# Patient Record
Sex: FEMALE | Race: WHITE | NOT HISPANIC OR LATINO | Employment: PART TIME | URBAN - NONMETROPOLITAN AREA
[De-identification: names, ages, dates, MRNs, and addresses within clinical notes are randomized per-mention and may not be internally consistent; named-entity substitution may affect disease eponyms.]

---

## 2017-03-24 ENCOUNTER — OFFICE VISIT - GICH (OUTPATIENT)
Dept: FAMILY MEDICINE | Facility: OTHER | Age: 22
End: 2017-03-24

## 2017-03-24 ENCOUNTER — HISTORY (OUTPATIENT)
Dept: FAMILY MEDICINE | Facility: OTHER | Age: 22
End: 2017-03-24

## 2017-03-24 DIAGNOSIS — F98.8 OTHER SPECIFIED BEHAVIORAL AND EMOTIONAL DISORDERS WITH ONSET USUALLY OCCURRING IN CHILDHOOD AND ADOLESCENCE: ICD-10-CM

## 2017-03-24 DIAGNOSIS — F41.8 OTHER SPECIFIED ANXIETY DISORDERS: ICD-10-CM

## 2017-03-24 DIAGNOSIS — Z00.00 ENCOUNTER FOR GENERAL ADULT MEDICAL EXAMINATION WITHOUT ABNORMAL FINDINGS: ICD-10-CM

## 2017-03-24 DIAGNOSIS — G43.809 OTHER MIGRAINE, NOT INTRACTABLE, WITHOUT STATUS MIGRAINOSUS: ICD-10-CM

## 2017-03-24 DIAGNOSIS — G47.00 INSOMNIA: ICD-10-CM

## 2017-03-24 DIAGNOSIS — Z79.899 OTHER LONG TERM (CURRENT) DRUG THERAPY: ICD-10-CM

## 2017-03-24 DIAGNOSIS — Z23 ENCOUNTER FOR IMMUNIZATION: ICD-10-CM

## 2017-03-24 ASSESSMENT — PATIENT HEALTH QUESTIONNAIRE - PHQ9: SUM OF ALL RESPONSES TO PHQ QUESTIONS 1-9: 6

## 2017-03-24 ASSESSMENT — ANXIETY QUESTIONNAIRES
4. TROUBLE RELAXING: NOT AT ALL
GAD7 TOTAL SCORE: 2
5. BEING SO RESTLESS THAT IT IS HARD TO SIT STILL: NOT AT ALL
3. WORRYING TOO MUCH ABOUT DIFFERENT THINGS: SEVERAL DAYS
1. FEELING NERVOUS, ANXIOUS, OR ON EDGE: SEVERAL DAYS
2. NOT BEING ABLE TO STOP OR CONTROL WORRYING: NOT AT ALL
7. FEELING AFRAID AS IF SOMETHING AWFUL MIGHT HAPPEN: NOT AT ALL
6. BECOMING EASILY ANNOYED OR IRRITABLE: NOT AT ALL

## 2017-03-25 ENCOUNTER — HISTORY (OUTPATIENT)
Dept: FAMILY MEDICINE | Facility: OTHER | Age: 22
End: 2017-03-25

## 2017-04-10 LAB
6-MONOACETYL MORPHINE - HISTORICAL: ABNORMAL NG/ML
AMPHETAMINE URINE - HISTORICAL: ABNORMAL NG/ML
BARBITURATE URINE - HISTORICAL: ABNORMAL NG/ML
BENZODIAZEPINE URINE - HISTORICAL: ABNORMAL NG/ML
BUPRENORPHRINE URINE - HISTORICAL: ABNORMAL NG/ML
COCAINE METAB URINE - HISTORICAL: ABNORMAL NG/ML
CREAT UR - HISTORICAL: 317 MG/DL
ETHYLGLUCURONIDE URINE - HISTORICAL: ABNORMAL NG/ML
FENTANYL URINE - HISTORICAL: ABNORMAL NG/ML
MASS SPECTROMETRY URINE - HISTORICAL: ABNORMAL
METHADONE URINE - HISTORICAL: ABNORMAL NG/ML
OPIATES URINE - HISTORICAL: ABNORMAL NG/ML
OXYCODONE URINE - HISTORICAL: ABNORMAL NG/ML
PH URINE - HISTORICAL: 7.6
PROPOXYPHENE URINE - HISTORICAL: ABNORMAL NG/ML
THC 50 URINE - HISTORICAL: ABNORMAL NG/ML
TRAMADOL - HISTORICAL: ABNORMAL NG/ML

## 2017-04-11 ENCOUNTER — AMBULATORY - GICH (OUTPATIENT)
Dept: SCHEDULING | Facility: OTHER | Age: 22
End: 2017-04-11

## 2017-05-18 ENCOUNTER — COMMUNICATION - GICH (OUTPATIENT)
Dept: FAMILY MEDICINE | Facility: OTHER | Age: 22
End: 2017-05-18

## 2017-05-18 DIAGNOSIS — H60.502 ACUTE NONINFECTIVE OTITIS EXTERNA OF LEFT EAR: ICD-10-CM

## 2017-06-19 ENCOUNTER — OFFICE VISIT - GICH (OUTPATIENT)
Dept: FAMILY MEDICINE | Facility: OTHER | Age: 22
End: 2017-06-19

## 2017-06-19 ENCOUNTER — HISTORY (OUTPATIENT)
Dept: FAMILY MEDICINE | Facility: OTHER | Age: 22
End: 2017-06-19

## 2017-06-19 DIAGNOSIS — F98.8 OTHER SPECIFIED BEHAVIORAL AND EMOTIONAL DISORDERS WITH ONSET USUALLY OCCURRING IN CHILDHOOD AND ADOLESCENCE: ICD-10-CM

## 2017-06-19 DIAGNOSIS — Z23 ENCOUNTER FOR IMMUNIZATION: ICD-10-CM

## 2017-06-22 ENCOUNTER — HISTORY (OUTPATIENT)
Dept: EMERGENCY MEDICINE | Facility: OTHER | Age: 22
End: 2017-06-22

## 2017-06-26 ENCOUNTER — COMMUNICATION - GICH (OUTPATIENT)
Dept: FAMILY MEDICINE | Facility: OTHER | Age: 22
End: 2017-06-26

## 2017-06-26 DIAGNOSIS — S29.019S: ICD-10-CM

## 2017-06-27 ENCOUNTER — COMMUNICATION - GICH (OUTPATIENT)
Dept: FAMILY MEDICINE | Facility: OTHER | Age: 22
End: 2017-06-27

## 2017-06-27 ENCOUNTER — HISTORY (OUTPATIENT)
Dept: FAMILY MEDICINE | Facility: OTHER | Age: 22
End: 2017-06-27

## 2017-06-27 ENCOUNTER — OFFICE VISIT - GICH (OUTPATIENT)
Dept: FAMILY MEDICINE | Facility: OTHER | Age: 22
End: 2017-06-27

## 2017-06-27 DIAGNOSIS — S39.012D STRAIN OF MUSCLE, FASCIA AND TENDON OF LOWER BACK, SUBSEQUENT ENCOUNTER: ICD-10-CM

## 2017-06-28 ENCOUNTER — COMMUNICATION - GICH (OUTPATIENT)
Dept: FAMILY MEDICINE | Facility: OTHER | Age: 22
End: 2017-06-28

## 2017-06-30 ENCOUNTER — HOSPITAL ENCOUNTER (OUTPATIENT)
Dept: PHYSICAL THERAPY | Facility: OTHER | Age: 22
Setting detail: THERAPIES SERIES
End: 2017-06-30
Attending: FAMILY MEDICINE

## 2017-06-30 DIAGNOSIS — S29.019S: ICD-10-CM

## 2017-07-03 ENCOUNTER — HOSPITAL ENCOUNTER (OUTPATIENT)
Dept: PHYSICAL THERAPY | Facility: OTHER | Age: 22
Setting detail: THERAPIES SERIES
End: 2017-07-03
Attending: FAMILY MEDICINE

## 2017-07-05 ENCOUNTER — COMMUNICATION - GICH (OUTPATIENT)
Dept: FAMILY MEDICINE | Facility: OTHER | Age: 22
End: 2017-07-05

## 2017-07-07 ENCOUNTER — HOSPITAL ENCOUNTER (OUTPATIENT)
Dept: PHYSICAL THERAPY | Facility: OTHER | Age: 22
Setting detail: THERAPIES SERIES
End: 2017-07-07
Attending: FAMILY MEDICINE

## 2017-07-10 ENCOUNTER — HOSPITAL ENCOUNTER (OUTPATIENT)
Dept: PHYSICAL THERAPY | Facility: OTHER | Age: 22
Setting detail: THERAPIES SERIES
End: 2017-07-10
Attending: FAMILY MEDICINE

## 2017-07-14 ENCOUNTER — HOSPITAL ENCOUNTER (OUTPATIENT)
Dept: PHYSICAL THERAPY | Facility: OTHER | Age: 22
Setting detail: THERAPIES SERIES
End: 2017-07-14
Attending: FAMILY MEDICINE

## 2017-07-18 ENCOUNTER — HOSPITAL ENCOUNTER (OUTPATIENT)
Dept: PHYSICAL THERAPY | Facility: OTHER | Age: 22
Setting detail: THERAPIES SERIES
End: 2017-07-18

## 2017-09-26 ENCOUNTER — COMMUNICATION - GICH (OUTPATIENT)
Dept: FAMILY MEDICINE | Facility: OTHER | Age: 22
End: 2017-09-26

## 2017-09-26 DIAGNOSIS — G47.00 INSOMNIA: ICD-10-CM

## 2017-10-09 ENCOUNTER — COMMUNICATION - GICH (OUTPATIENT)
Dept: FAMILY MEDICINE | Facility: OTHER | Age: 22
End: 2017-10-09

## 2017-10-09 DIAGNOSIS — G47.00 INSOMNIA: ICD-10-CM

## 2017-11-27 ENCOUNTER — COMMUNICATION - GICH (OUTPATIENT)
Dept: FAMILY MEDICINE | Facility: OTHER | Age: 22
End: 2017-11-27

## 2017-11-27 DIAGNOSIS — Z11.1 ENCOUNTER FOR SCREENING FOR RESPIRATORY TUBERCULOSIS: ICD-10-CM

## 2017-11-28 ENCOUNTER — AMBULATORY - GICH (OUTPATIENT)
Dept: SCHEDULING | Facility: OTHER | Age: 22
End: 2017-11-28

## 2017-11-30 ENCOUNTER — COMMUNICATION - GICH (OUTPATIENT)
Dept: FAMILY MEDICINE | Facility: OTHER | Age: 22
End: 2017-11-30

## 2017-12-07 ENCOUNTER — COMMUNICATION - GICH (OUTPATIENT)
Dept: FAMILY MEDICINE | Facility: OTHER | Age: 22
End: 2017-12-07

## 2017-12-07 DIAGNOSIS — G47.00 INSOMNIA: ICD-10-CM

## 2017-12-27 NOTE — PROGRESS NOTES
Patient Information     Patient Name MRN Sex George Negron [id number] Female 1995      Progress Notes by Cyn Avendaño MD at 2017  1:30 PM     Author:  Cyn Avendaño MD Service:  (none) Author Type:  Physician     Filed:  2017  4:56 PM Encounter Date:  2017 Status:  Signed     :  Cyn Avendaño MD (Physician)            SUBJECTIVE:    George Mcknight is a 21 y.o. female who presents for refill of the adderall she takes for ADD.  She has not needed to take it as much during the summer when she is at work, but does use it consistently during the school year.  She will be starting her 2nd year of college at [employer] this .  She is planning on going into nursing.  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  She has had a good appetite.  No issues with weight loss.  She feels that her mood has been good.    She also needs her 3rd gardasil vaccine and would like to get that today.    HPI  I personally reviewed medications/allergies/history listed below:      Allergies      Allergen   Reactions     Cats (Fur, Dander, Saliva)  Edema     Swelling of face/eyes/throat      Other [Unlisted Allergen (Include Detail In Comments)]  Other - Describe In Comment Field     Trees - itchy    , No family history on file.,   Current Outpatient Prescriptions on File Prior to Visit       Medication  Sig Dispense Refill     albuterol HFA 90 mcg/actuation inhaler Inhale 2 Puffs by mouth 4 times daily if needed.       cholecalciferol (VITAMIN D) 1,000 unit capsule Take 1 capsule by mouth once daily.       FLUoxetine (PROZAC) 10 mg capsule Take 1 capsule by mouth once daily. 30 capsule 11     FLUoxetine (PROZAC) 40 mg capsule Take 1 capsule by mouth once daily. 30 capsule 11     medication order composer ZMA, 2 tablets by mouth at bedtime (zinc, mag, vit B6)       ofloxacin (FLOXIN) 0.3 % otic solution Place 5 Drops  into left ear 2 times daily. 5 mL 2     pseudoephedrine (SUDAFED) 60 mg tablet Take 1 tablet by mouth every 6 hours if needed (seasonal allergies).       ramelteon (ROZEREM) 8 mg tablet Take 1 tablet by mouth at bedtime. 30 tablet 2     SUMAtriptan (IMITREX) 50 mg tablet Take 1 tablet by mouth every 2 hours if needed for Migraine. Max dose: 200mg per 24 hrs. 9 tablet 11     No current facility-administered medications on file prior to visit.    ,   Past Medical History:     Diagnosis  Date     ADD (attention deficit disorder)      Convergence insufficiency 2012    as a result of TBI - had to wear prism glasses for about 1 year.      Depression with anxiety      Migraines      TBI (traumatic brain injury) (HC) 12/30/2012    playing high school hockey.    ,   Patient Active Problem List     Diagnosis  Code     Controlled substance agreement signed 3/24/17 Z79.899     ADD (attention deficit disorder) F98.8     Migraine without status migrainosus, not intractable G43.909     Depression with anxiety F41.8     Insomnia G47.00    and No past surgical history on file.  Social History     Social History        Marital status:  Single     Spouse name: N/A     Number of children:  N/A     Years of education:  N/A     Occupational History      Not on file.     Social History Main Topics         Smoking status:   Never Smoker     Smokeless tobacco:   Never Used     Alcohol use   Yes      Comment: a few times a year      Drug use:   No     Sexual activity:   Not Currently     Other Topics  Concern     Not on file      Social History Narrative     Student at [employer].  Studying toward her BSN.  Family moved from [place], [place] fall 2016.  Both parents grew up in the [facility] [place].    Mom - Noel is RN, Clinic Nurse Manager at [employer].    Dad - Armando    Sister - Marianna is  and works as an RN at [employer] in [place].      REVIEW OF SYSTEMS:  Review of Systems   Constitutional: Negative for malaise/fatigue and weight loss.    Psychiatric/Behavioral: Negative for depression.   All other systems reviewed and are negative.      OBJECTIVE:  BP 98/62  Temp 97.6  F (36.4  C) (Tympanic)  Wt 55.3 kg (122 lb)  LMP 05/26/2017  Breastfeeding? No    EXAM:   Physical Exam   Constitutional: She is well-developed, well-nourished, and in no distress.   HENT:   Head: Normocephalic.   Eyes: Pupils are equal, round, and reactive to light.   Neck: Normal range of motion. Neck supple. No thyromegaly present.   Cardiovascular: Normal rate, regular rhythm and normal heart sounds.    No murmur heard.  Pulmonary/Chest: Effort normal and breath sounds normal. No respiratory distress. She has no wheezes. She has no rales.   Musculoskeletal: She exhibits no edema.   Lymphadenopathy:     She has no cervical adenopathy.   Psychiatric: Affect normal.   PHQ Depression Screen  Date of PHQ exam: 06/19/17  Over the last 2 weeks, how often have you been bothered by any of the following problems?  1. Little interest or pleasure in doing things: 0 - Not at all  2. Feeling down, depressed, or hopeless: 0 - Not at all                ASSESSMENT/PLAN:    ICD-10-CM    1. ADD (attention deficit disorder) F98.8 dextroamphetamine-amphetamine (ADDERALL XR) 25 mg capsule      dextroamphetamine-amphetamine (ADDERALL) 10 mg tablet      DISCONTINUED: dextroamphetamine-amphetamine (ADDERALL XR) 25 mg capsule      DISCONTINUED: dextroamphetamine-amphetamine (ADDERALL) 10 mg tablet      DISCONTINUED: dextroamphetamine-amphetamine (ADDERALL XR) 25 mg capsule      DISCONTINUED: dextroamphetamine-amphetamine (ADDERALL) 10 mg tablet   2. Need for HPV vaccination Z23 OMNI GARDASIL 9      CO ADMIN VACC INITIAL        Plan:    1.   website printout reviewed and scanned today.  [employer] last completed 3/24/17.  Contract last completed same date.  Follow up in approximately 3 months for next refill.  2.  gardasil #3 given today.  Cyn Avendaño MD ....................  6/19/2017    4:56 PM

## 2017-12-27 NOTE — PROGRESS NOTES
Patient Information     Patient Name MRN Sex George Negron [id number] Female 1995      Progress Notes by Cyn Avendaño MD at 2017 11:00 AM     Author:  Cyn Avendaño MD Service:  (none) Author Type:  Physician     Filed:  2017 12:55 PM Encounter Date:  2017 Status:  Signed     :  Cyn Avendaño MD (Physician)            SUBJECTIVE:    George Mcknight is a 21 y.o. female who presents for follow up of a worker's compensation injury which occurred at [facility] on 17.  She and another assistant were helping to transfer a heavy patient.  The patient is an assist of 2.  George had ahold of the transfer belt and was standing in front of patient.  The brakes on the wheel chair broke and the chair was scooting further back than anticipated.  The other worker was behind the chair.  To avoid having the patient fall, George had to strain to lift patient so that she would not fall.  She ended up straining her mid to low back and radiates to her left back.  She was evaluated in the ER.  Went to work on .  Was worse that day.  Has some tingling down her left leg.  She has been referred to Physical Therapy, but is waiting on worker's compensation to approve it before she can move forth.  No bowel or bladder incontinence/dysfunction.  No saddle anesthesia.  No weakness of lower extremities.    HPI  I personally reviewed medications/allergies/history listed below:      Allergies      Allergen   Reactions     Cats (Fur, Dander, Saliva)  Edema     Swelling of face/eyes/throat      Other [Unlisted Allergen (Include Detail In Comments)]  Other - Describe In Comment Field     Trees - itchy    , No family history on file.,   Current Outpatient Prescriptions on File Prior to Visit       Medication  Sig Dispense Refill     albuterol HFA 90 mcg/actuation inhaler Inhale 2 Puffs by mouth 4 times daily if needed.       cholecalciferol (VITAMIN D) 1,000 unit capsule Take 1  capsule by mouth once daily.       [START ON 8/17/2017] dextroamphetamine-amphetamine (ADDERALL XR) 25 mg capsule Take 1 capsule by mouth every morning  Earliest Fill Date: 8/17/17 30 capsule 0     [START ON 8/17/2017] dextroamphetamine-amphetamine (ADDERALL) 10 mg tablet Earliest Fill Date: 8/17/17 Take 1 tablet by mouth in the evening as needed 30 tablet 0     FLUoxetine (PROZAC) 10 mg capsule Take 1 capsule by mouth once daily. 30 capsule 11     FLUoxetine (PROZAC) 40 mg capsule Take 1 capsule by mouth once daily. 30 capsule 11     medication order composer ZMA, 2 tablets by mouth at bedtime (zinc, mag, vit B6)       pseudoephedrine (SUDAFED) 60 mg tablet Take 1 tablet by mouth every 6 hours if needed (seasonal allergies).       ramelteon (ROZEREM) 8 mg tablet Take 1 tablet by mouth at bedtime. 30 tablet 2     SUMAtriptan (IMITREX) 50 mg tablet Take 1 tablet by mouth every 2 hours if needed for Migraine. Max dose: 200mg per 24 hrs. 9 tablet 11     No current facility-administered medications on file prior to visit.    ,   Past Medical History:     Diagnosis  Date     ADD (attention deficit disorder)      Convergence insufficiency 2012    as a result of TBI - had to wear prism glasses for about 1 year.      Depression with anxiety      Migraines      TBI (traumatic brain injury) (HC) 12/30/2012    playing high school hockey.    ,   Patient Active Problem List     Diagnosis  Code     Controlled substance agreement signed 3/24/17 Z79.899     ADD (attention deficit disorder) F98.8     Migraine without status migrainosus, not intractable G43.909     Depression with anxiety F41.8     Insomnia G47.00    and No past surgical history on file.  Social History     Social History        Marital status:  Single     Spouse name: N/A     Number of children:  N/A     Years of education:  N/A     Occupational History      Not on file.     Social History Main Topics         Smoking status:   Never Smoker     Smokeless tobacco:    Never Used     Alcohol use   Yes      Comment: a few times a year      Drug use:   No     Sexual activity:   Not Currently     Other Topics  Concern     Not on file      Social History Narrative     Student at [employer].  Studying toward her BSN.  Family moved from [place], [place] fall 2016.  Both parents grew up in the [place] area.    Mom - Noel is RN, Clinic Nurse Manager at [employer].    Dad - Armando    Sister - Marianna is  and works as an RN at [employer] in [place].      REVIEW OF SYSTEMS:  Review of Systems   All other systems reviewed and are negative.      OBJECTIVE:  BP 96/78  Temp 98.4  F (36.9  C) (Tympanic)  Wt 54.3 kg (119 lb 12.8 oz)  LMP 06/01/2017  Breastfeeding? No  BMI 21.22 kg/m2    EXAM:   Physical Exam   Constitutional: She is well-developed, well-nourished, and in no distress.   HENT:   Head: Normocephalic.   Eyes: Pupils are equal, round, and reactive to light.   Neck: Neck supple.   Cardiovascular: Normal rate, regular rhythm and normal heart sounds.    No murmur heard.  Pulmonary/Chest: Effort normal and breath sounds normal. No respiratory distress. She has no wheezes. She has no rales.   Musculoskeletal:   No spinous process tenderness in thoracic or lumbar spine.  No sciatic notch tenderness.  paraspinous muscle tenderness in upper lumbar region.     Neurological:   DTRs are 2+ and symmetric at achilles.  2+ at right knee, 1+ at left knee.   Psychiatric: Affect normal.   PHQ Depression Screen     Over the last 2 weeks, how often have you been bothered by any of the following problems?  Patient declined today.           ASSESSMENT/PLAN:    ICD-10-CM    1. Back strain, subsequent encounter S39.012D     Thoracolumbar region          Plan:    1.  She has already been referred to Physical Therapy.  Workability note written for her to be off of work until 7/6/17.  Return to work without restrictions at that time. Follow up if further problems.  Cyn Avendaño MD ....................   6/27/2017   12:51 PM

## 2017-12-28 NOTE — TELEPHONE ENCOUNTER
Patient Information     Patient Name MRN Sex George Negron [id number] Female 1995      Telephone Encounter by Shannon Hendrix at 2017  3:56 PM     Author:  Shannon Hendrix Service:  (none) Author Type:  (none)     Filed:  2017  3:57 PM Encounter Date:  2017 Status:  Signed     :  Shannon Hendrix            Can schedulers forward lab orders to [facility]?  Shannon Hendrix LPN .............2017  3:56 PM

## 2017-12-28 NOTE — PROGRESS NOTES
Patient Information     Patient Name MRN Sex George Negron [id number] Female 1995      Progress Notes by Man Solano, PT at 2017 11:01 AM     Author:  Man Solano, PT Service:  (none) Author Type:  PT- Physical Therapist     Filed:  2017  2:26 PM Date of Service:  2017 11:01 AM Status:  Signed     :  Man Solano PT (PT- Physical Therapist)            [facility]  Outpatient PT - Daily Note    Date of Service: 2017   Visit# 5 (5 of 10)    Patient Name: George Mcknight   YOB: 1995   Referring MD/Provider: Dr. Jarocho BETANCUR  Medical and Treatment Diagnosis: Strain of thoracic region  PT Treatment Diagnosis: Same  Start of Service: 17  Certification Dates: Start of Service: 17  Medicare/MA Re-Cert Due: 17     Living Situations:  Independent in Living Situation      Cognition:  Oriented to Person, Place, and Time.   Precautions:  Hx of cervical spine injury and TBI/concusions from hockey.  Lifting restriction at work.    Subjective      Patient reports pain is slowly improving.  She feels that she is functioning at 80% of her normal daily activities.  She has returned to work without restrictions.  She reports having moderate soreness after her shift.    Current Symptoms:    Pain Ratin = Moderate Pain, (Aggravating, Grueling, Upsetting, Frustrating) at its worst.  Night pain reported.  5/10 during the day.  Pain Location - thoracic spine  Decreased Motion - bending  Weakness - denies specific weakness of LE's.  Swelling - denies  Tingling/Numbness - yes, left anterior thigh to knee        Objective      Today's Intervention:    Reviewed HEP    Continuous Ultrasound was applied to thoracic and lumbar paraspinals.  Parameters: 1.5 w/cm2 x 20 min.    MT:  STM/DTM to lumbar and lower thoracic paraspinals.  IASTM using G4 to same region.    TE:  Lumbar flexion stretch  Cat/camel  Supine 90/90 core stab  Hip  stretching  Clam    Response to Intervention:  Good tolerance to treatment     Home Exercise Program:    Liz pose for spine flexion  Cat/camel  90/90 hip lift with core stab  Hip stretching  Side lying reach and roll  Clam    Assessment    Therapist Assessment / Clinical Impression:  Thoracic strain. Left leg radiculopathy.    Rehab Prognosis: Good    Functional Impairment(s): See subjective on initial evaluation and Functional Assessment listed below.      Oswestry Disability Questionnaire:  Total Score:  26/50  Impairment Ratin%     Pain Intensity:  4/5   Standing:   3/5   Personal Cares: 1/5   Sleepin/5   Liftin/5   Social Life:   2/5   Walkin/5   Travelin   Sittin5   Employment/Homemaking:    3    G codes and Modifier based on Oswestry score:       Current Primary G Code and Modifier:    Per the Patient's intake and/or assessment the Primary G Code is: Handling Objects .   The Patient's Impairment, Limitation or Restriction Modifier would be best described as: CK - 40% - 60% Impairment.     Goal Primary G Code and Modifier:    The Patient's G Code Goal would be: Handling Objects    The Patient's Impairment, Limitation or Restriction Modifier goal would be best described as: CI - 1% - 20% Impairment.       Goals:  Patient goal:   Decrease pain at night to be able to sleep comfortably.  Return to all work activities without pain.    Short term goals: ( 4 weeks)    Patient will be able to tolerate sitting with less than 3/10 pain up to 60 minutes     Patient will be able to sleep 6 hours with minimal to no disruptions due to pain    Patient will demonstrate spine mobility all within functional limits to allow reaching items on floor with minimal to no pain       Long term goals: ( 8 weeks)    Patient will be independent in their Home Exercise Program    Minimal to no difficulty standing with meal preparation for 30 minutes    Minimal to no difficulty  lifting/carrying objects weighing 50 lbs     Minimal to no difficulty with 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      Patient participated in goal selection and understand(s) the plan of care: Yes   Patient Potential for Achieving Desired Outcome: Good     Plan    Treatment Plan:      Frequency:   12 visits    Duration of Treatment: 6 weeks    Planned Interventions:    Home Exercise Program development  Therapeutic Exercise (ROM & Strengthening)  Neuromuscular Re-education  Manual Therapy  Ultrasound  Vasopneumatic Compression with Cold Therapy ( Game Ready )  Hot Packs / Cold Pack Modalities  E-Stim  Phonophoresis with Ketoprofen  Iontophoresis with Dexamethasone  Mechanical Traction    Thank you for your referral to [facility].  Please call with any questions, concerns or comments.  [phone]

## 2017-12-28 NOTE — TELEPHONE ENCOUNTER
Patient Information     Patient Name MRN Sex George Negron [id number] Female 1995      Telephone Encounter by Valencia Mahmood at 2017  9:57 AM     Author:  Valencia Mahmood Service:  (none) Author Type:  (none)     Filed:  2017  9:58 AM Encounter Date:  2017 Status:  Signed     :  Valencia Mahmood            Left message for patient to call us with the fax number she needs the lab order sent to.  Valencia Mahmood ....................  2017   9:58 AM

## 2017-12-28 NOTE — TELEPHONE ENCOUNTER
Patient Information     Patient Name MRN Sex Geroge Negron [id number] Female 1995      Telephone Encounter by Areli Alvarado at 2017 11:20 AM     Author:  Areli Alvarado Service:  (none) Author Type:  (none)     Filed:  2017 11:22 AM Encounter Date:  2017 Status:  Signed     :  Areli Alvarado, She labs drawn at [facility] in [place].  She will have them faxed here.  Areli Alvarado LPN..........2017  11:21 AM

## 2017-12-28 NOTE — TELEPHONE ENCOUNTER
Patient Information     Patient Name MRN Sex George Negron [id number] Female 1995      Telephone Encounter by Larissa Martinez at 2017 10:41 AM     Author:  Larissa Martinez Service:  (none) Author Type:  (none)     Filed:  2017 10:41 AM Encounter Date:  2017 Status:  Signed     :  Larissa Martinez            Left message to call back.   Larissa Martinez ....................  2017   10:41 AM

## 2017-12-28 NOTE — TELEPHONE ENCOUNTER
Patient Information     Patient Name MRN Sex George Negron [id number] Female 1995      Telephone Encounter by Christi Mendoza at 10/10/2017  1:30 PM     Author:  Christi Mendoza Service:  (none) Author Type:  (none)     Filed:  10/10/2017  1:32 PM Encounter Date:  10/9/2017 Status:  Signed     :  Christi Mendoza            Patient needs to try a formulary for 1 month and fail ,before they would  cover rozarem .  Christi Mendoza LPN ....................10/10/2017  1:31 PM

## 2017-12-28 NOTE — TELEPHONE ENCOUNTER
Patient Information     Patient Name MRN Sex George Negron [id number] Female 1995      Telephone Encounter by Shannon Hendrix at 2017 11:26 AM     Author:  Shannon Hendrix Service:  (none) Author Type:  (none)     Filed:  2017 11:30 AM Encounter Date:  2017 Status:  Signed     :  Shannon Hendrix            Patient states is want a tb blood test. Patient states has had mantoux in past all have been negative but has a dread line that will not be able to complete a 2 step, so is requesting blood test.  Shannon Hendrix LPN .............2017  11:27 AM

## 2017-12-28 NOTE — TELEPHONE ENCOUNTER
Patient Information     Patient Name MRN Sex George Negron [id number] Female 1995      Telephone Encounter by Valencia Mahmood at 2017  2:55 PM     Author:  Valencia Mahmood Service:  (none) Author Type:  (none)     Filed:  2017  2:56 PM Encounter Date:  2017 Status:  Signed     :  Valencia Mahmood             Spoke with George and let her know that we faxed the lab order to [facility] at fax # [phone].  Valencia Mahmood ....................  2017   2:56 PM

## 2017-12-28 NOTE — TELEPHONE ENCOUNTER
Patient Information     Patient Name MRN Sex George Negron [id number] Female 1995      Telephone Encounter by Christi Mendoza at 2017  9:56 AM     Author:  Christi Mendoza Service:  (none) Author Type:  (none)     Filed:  2017  9:56 AM Encounter Date:  2017 Status:  Signed     :  Christi Mendoza            I do not see any results back , please advise.  Christi Mendoza LPN ....................2017  9:56 AM

## 2017-12-28 NOTE — TELEPHONE ENCOUNTER
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  Making progress in Physical Therapy, but this is a set-back.  Requested updated workability note extending restrictions for one additional week, but thinks she would be able to lift up to 20 lb occasionally.  Updated workability note in outbox.  Please fax to [facility].  Cyn Avendaño MD ....................  2017   11:36 AM

## 2017-12-28 NOTE — TELEPHONE ENCOUNTER
Patient Information     Patient Name MRN Sex George Negron [id number] Female 1995      Telephone Encounter by Cyn Avendaño MD at 2017 12:15 PM     Author:  Cyn Avendaño MD Service:  (none) Author Type:  Physician     Filed:  2017 12:16 PM Encounter Date:  2017 Status:  Signed     :  Cyn Avendaño MD (Physician)            Her employer still wants her to work.  I changed them to allow this, but put restrictions on them to try to minimize what she is going to be doing so that hopefully will not aggravate her back.  Cyn Avendaño MD ....................  2017   12:16 PM

## 2017-12-28 NOTE — TELEPHONE ENCOUNTER
Patient Information     Patient Name MRN Sex George Negron [id number] Female 1995      Telephone Encounter by Santy Baptiste LPN at 2017 12:22 PM     Author:  Santy Baptiste LPN Service:  (none) Author Type:  NURS- Licensed Practical Nurse     Filed:  2017 12:22 PM Encounter Date:  2017 Status:  Signed     :  Santy Baptiste LPN (NURS- Licensed Practical Nurse)            Left message to call back  ...................Santy Baptiste LPN....2017 12:22 PM

## 2017-12-28 NOTE — TELEPHONE ENCOUNTER
Patient Information     Patient Name MRN Sex George Negron [id number] Female 1995      Telephone Encounter by Mine Bustos at 2017  3:20 PM     Author:  Mine Bustos Service:  (none) Author Type:  (none)     Filed:  2017  3:20 PM Encounter Date:  2017 Status:  Signed     :  Mine Bustos            Form faxed to [phone], attn Rosario DIXON as requested the last time I faxed a form.  Mine Bustos CMA (AAMA)................ 2017 3:20 PM

## 2017-12-28 NOTE — TELEPHONE ENCOUNTER
Patient Information     Patient Name MRN Sex George Negron [id number] Female 1995      Telephone Encounter by Cyn Avendaño MD at 2017  3:35 PM     Author:  Cyn Avendaño MD Service:  (none) Author Type:  Physician     Filed:  2017  3:35 PM Encounter Date:  2017 Status:  Signed     :  Cyn Avendaño MD (Physician)            Referral placed for Physical Therapy.  Cyn Avendaño MD ....................  2017   3:35 PM

## 2017-12-28 NOTE — TELEPHONE ENCOUNTER
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  Would prefer the workability slip to specify restrictions for pushing, pulling, lifting, etc.  Currently is states she is unable to return to work until 17.  Could the workability be changed?  If so, please fax it to [phone].  If not, please call employer back.  Mine Bustos CMA (AAMA)................ 2017 11:56 AM

## 2017-12-28 NOTE — TELEPHONE ENCOUNTER
Patient Information     Patient Name MRN Sex George Negron [id number] Female 1995      Telephone Encounter by Berna Ontiveros at 2017 11:23 AM     Author:  Berna Ontiveros Service:  (none) Author Type:  (none)     Filed:  2017 11:41 AM Encounter Date:  2017 Status:  Signed     :  Berna Ontiveros            Patient looking for a report of workability form.     Left message to call back.   ,Berna Ontiveros LPN ..........2017 11:40 AM

## 2017-12-28 NOTE — TELEPHONE ENCOUNTER
Patient Information     Patient Name MRN Sex George Negron [id number] Female 1995      Telephone Encounter by Cyn Avendaño MD at 2017 12:27 PM     Author:  Cyn Avendaño MD Service:  (none) Author Type:  Physician     Filed:  2017 12:27 PM Encounter Date:  2017 Status:  Signed     :  Cyn Avendaño MD (Physician)            Noted.  Cyn Avendaño MD ....................  2017   12:27 PM

## 2017-12-28 NOTE — TELEPHONE ENCOUNTER
Patient Information     Patient Name MRN Sex George Negron [id number] Female 1995      Telephone Encounter by Cyn Avendaño MD at 10/10/2017  3:20 PM     Author:  Cyn Avendaño MD Service:  (none) Author Type:  Physician     Filed:  10/10/2017  3:21 PM Encounter Date:  10/9/2017 Status:  Signed     :  Cyn Avendaño MD (Physician)            Prescription in outbox.  Cyn Avendaño MD ....................  10/10/2017   3:20 PM

## 2017-12-28 NOTE — TELEPHONE ENCOUNTER
Patient Information     Patient Name MRN Sex George Negron [id number] Female 1995      Telephone Encounter by Berna Ontiveros at 2017 12:23 PM     Author:  Berna Ontiveros Service:  (none) Author Type:  (none)     Filed:  2017 12:23 PM Encounter Date:  2017 Status:  Signed     :  Berna Ontiveros            New message started  Berna Ontiveros LPN ..........2017 12:23 PM

## 2017-12-28 NOTE — TELEPHONE ENCOUNTER
Patient Information     Patient Name MRN Sex George Negron [id number] Female 1995      Telephone Encounter by Mine Bustos at 2017  5:28 PM     Author:  Mine Bustos Service:  (none) Author Type:  (none)     Filed:  2017  5:30 PM Encounter Date:  2017 Status:  Signed     :  Mine Bustos            Left message for Rosario that Cyn Avendaño MD completed a new workability form.  Unfortunately, Cyn Avendaño MD is now out of the office until late next week.  If there are any further problems, patient will most likely need to be seen by another provider.  Form faxed to number given earlier.  Mine Bustos CMA (MA)................ 2017 5:29 PM

## 2017-12-28 NOTE — TELEPHONE ENCOUNTER
Patient Information     Patient Name MRN George Laughlin [id number] Female 1995      Telephone Encounter by Shannon Hendrix at 2017 12:06 PM     Author:  Shannon Hendrix Service:  (none) Author Type:  (none)     Filed:  2017 12:07 PM Encounter Date:  2017 Status:  Signed     :  Shannon Hendrix            Patient notified.  Shannon Hendrix LPN .............2017  12:06 PM

## 2017-12-28 NOTE — TELEPHONE ENCOUNTER
Patient Information     Patient Name MRN Sex George Negron [id number] Female 1995      Telephone Encounter by Cyn Avendaño MD at 2017  5:03 PM     Author:  Cyn Avendaño MD Service:  (none) Author Type:  Physician     Filed:  2017  5:04 PM Encounter Date:  2017 Status:  Signed     :  Cyn Avendaño MD (Physician)            Revised workability note in outbox.  Cyn Avendaño MD ....................  2017   5:04 PM

## 2017-12-28 NOTE — PROGRESS NOTES
Patient Information     Patient Name MRN Sex George Negron [id number] Female 1995      Progress Notes by Man Solano, PT at 2017  1:10 PM     Author:  Man Solano, PT Service:  (none) Author Type:  PT- Physical Therapist     Filed:  7/10/2017  3:03 PM Date of Service:  2017  1:10 PM Status:  Signed     :  Man Solano PT (PT- Physical Therapist)            [facility]  Outpatient PT - Daily Note    Date of Service: 2017   Visit# 3 (3 of 10)    Patient Name: George Mcknight   YOB: 1995   Referring MD/Provider: Dr. Jarocho BETANCUR  Medical and Treatment Diagnosis: Strain of thoracic region  PT Treatment Diagnosis: Same  Start of Service: 17  Certification Dates: Start of Service: 17  Medicare/MA Re-Cert Due: 17     Living Situations:  Independent in Living Situation      Cognition:  Oriented to Person, Place, and Time.   Precautions:  Hx of cervical spine injury and TBI/concusions from hockey.  Lifting restriction at work.    Subjective      Patient reports re-aggravating her back pain yesterday while at work.  She was standing next to a client and the client lost her balance.  George helped the client from falling.  She is reporting moderate pain and soreness today.  Continues to have moderate to severe pain at night when sleeping or lying in bed.      Current Symptoms:    Pain Ratin = Severe Pain, (Miserable, Gnawing, Fierce, Piercing) at its worst.  Night pain reported.  5/10 during the day.  Pain Location - thoracic spine  Decreased Motion - bending  Weakness - denies specific weakness of LE's.  Swelling - denies  Tingling/Numbness - yes, left anterior thigh to knee        Objective      Today's Intervention:    Reviewed HEP    Continuous Ultrasound was applied to thoracic and lumbar paraspinals.  Parameters: 1.5 w/cm2 x 20 min.    MT:  STM/DTM to lumbar and lower thoracic paraspinals.  IASTM using G4 to same  region.    TE:  Lumbar flexion stretch  Cat/camel  Supine 90/90 core stab  Hip stretching  Clam    Response to Intervention:  Good tolerance to treatment     Home Exercise Program:    Liz pose for spine flexion  Cat/camel  90/90 hip lift with core stab  Hip stretching  Side lying reach and roll  Clam    Assessment    Therapist Assessment / Clinical Impression:  Thoracic strain. Left leg radiculopathy.    Rehab Prognosis: Good    Functional Impairment(s): See subjective on initial evaluation and Functional Assessment listed below.      Oswestry Disability Questionnaire:  Total Score:  26/50  Impairment Ratin%     Pain Intensity:  4/5   Standing:   3/5   Personal Cares: 1/5   Sleepin5   Liftin   Social Life:   2   Walkin5   Travelin/5   Sittin/5   Employment/Homemaking:    3/5    G codes and Modifier based on Oswestry score:       Current Primary G Code and Modifier:    Per the Patient's intake and/or assessment the Primary G Code is: Handling Objects .   The Patient's Impairment, Limitation or Restriction Modifier would be best described as: CK - 40% - 60% Impairment.     Goal Primary G Code and Modifier:    The Patient's G Code Goal would be: Handling Objects    The Patient's Impairment, Limitation or Restriction Modifier goal would be best described as: CI - 1% - 20% Impairment.       Goals:  Patient goal:   Decrease pain at night to be able to sleep comfortably.  Return to all work activities without pain.    Short term goals: ( 4 weeks)    Patient will be able to tolerate sitting with less than 3/10 pain up to 60 minutes     Patient will be able to sleep 6 hours with minimal to no disruptions due to pain    Patient will demonstrate spine mobility all within functional limits to allow reaching items on floor with minimal to no pain       Long term goals: ( 8 weeks)    Patient will be independent in their Home Exercise Program    Minimal to no difficulty  standing with meal preparation for 30 minutes    Minimal to no difficulty lifting/carrying objects weighing 50 lbs     Minimal to no difficulty with 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      Patient participated in goal selection and understand(s) the plan of care: Yes   Patient Potential for Achieving Desired Outcome: Good     Plan    Treatment Plan:      Frequency:   12 visits    Duration of Treatment: 6 weeks    Planned Interventions:    Home Exercise Program development  Therapeutic Exercise (ROM & Strengthening)  Neuromuscular Re-education  Manual Therapy  Ultrasound  Vasopneumatic Compression with Cold Therapy ( Game Ready )  Hot Packs / Cold Pack Modalities  E-Stim  Phonophoresis with Ketoprofen  Iontophoresis with Dexamethasone  Mechanical Traction    Thank you for your referral to [facility].  Please call with any questions, concerns or comments.  [phone]

## 2017-12-28 NOTE — PROGRESS NOTES
Patient Information     Patient Name MRN Sex George Negron [id number] Female 1995      Progress Notes by Man Solano, PT at 7/3/2017  4:44 PM     Author:  Man Solano, PT Service:  (none) Author Type:  PT- Physical Therapist     Filed:  7/3/2017  4:50 PM Date of Service:  7/3/2017  4:44 PM Status:  Signed     :  Man Solano PT (PT- Physical Therapist)            [facility]  Outpatient PT - Daily Note    Date of Service: 7/3/2017   Visit# 2 (2 of 10)    Patient Name: George Mcknight   YOB: 1995   Referring MD/Provider: Dr. Jarocho BETANCUR  Medical and Treatment Diagnosis: Strain of thoracic region  PT Treatment Diagnosis: Same  Start of Service: 17  Certification Dates: Start of Service: 17  Medicare/MA Re-Cert Due: 17     Living Situations:  Independent in Living Situation      Cognition:  Oriented to Person, Place, and Time.   Precautions:  Hx of cervical spine injury and TBI/concusions from hockey.  Lifting restriction at work.    Subjective      Patient reports re-aggravating her back pain yesterday while at work.  She was standing next to a client and the client lost her balance.  George helped the client from falling.  She is reporting moderate pain and soreness today.  Continues to have moderate to severe pain at night when sleeping or lying in bed.      Current Symptoms:    Pain Ratin = Severe Pain, (Miserable, Gnawing, Fierce, Piercing) at its worst.  Night pain reported.  5/10 during the day.  Pain Location - thoracic spine  Decreased Motion - bending  Weakness - denies specific weakness of LE's.  Swelling - denies  Tingling/Numbness - yes, left anterior thigh to knee        Objective      Today's Intervention:    Reviewed HEP    Continuous Ultrasound was applied to thoracic and lumbar paraspinals.  Parameters: 1.5 w/cm2 x 20 min.    Lumbar flexion stretch  Cat/camel  Supine 90/90 core stab  Hip  stretching  Clam    Response to Intervention:  Good tolerance to treatment     Home Exercise Program:    Liz pose for spine flexion  Cat/camel  90/90 hip lift with core stab  Hip stretching  Side lying reach and roll  Clam    Assessment    Therapist Assessment / Clinical Impression:  Thoracic strain. Left leg radiculopathy.    Rehab Prognosis: Good    Functional Impairment(s): See subjective on initial evaluation and Functional Assessment listed below.      Oswestry Disability Questionnaire:  Total Score:  26/50  Impairment Ratin%     Pain Intensity:  4/5   Standing:   3/5   Personal Cares: 1/5   Sleepin/5   Liftin/5   Social Life:   2/5   Walkin/5   Travelin   Sittin5   Employment/Homemaking:    3    G codes and Modifier based on Oswestry score:       Current Primary G Code and Modifier:    Per the Patient's intake and/or assessment the Primary G Code is: Handling Objects .   The Patient's Impairment, Limitation or Restriction Modifier would be best described as: CK - 40% - 60% Impairment.     Goal Primary G Code and Modifier:    The Patient's G Code Goal would be: Handling Objects    The Patient's Impairment, Limitation or Restriction Modifier goal would be best described as: CI - 1% - 20% Impairment.       Goals:  Patient goal:   Decrease pain at night to be able to sleep comfortably.  Return to all work activities without pain.    Short term goals: ( 4 weeks)    Patient will be able to tolerate sitting with less than 3/10 pain up to 60 minutes     Patient will be able to sleep 6 hours with minimal to no disruptions due to pain    Patient will demonstrate spine mobility all within functional limits to allow reaching items on floor with minimal to no pain       Long term goals: ( 8 weeks)    Patient will be independent in their Home Exercise Program    Minimal to no difficulty standing with meal preparation for 30 minutes    Minimal to no difficulty  lifting/carrying objects weighing 50 lbs     Minimal to no difficulty with 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      Patient participated in goal selection and understand(s) the plan of care: Yes   Patient Potential for Achieving Desired Outcome: Good     Plan    Treatment Plan:      Frequency:   12 visits    Duration of Treatment: 6 weeks    Planned Interventions:    Home Exercise Program development  Therapeutic Exercise (ROM & Strengthening)  Neuromuscular Re-education  Manual Therapy  Ultrasound  Vasopneumatic Compression with Cold Therapy ( Game Ready )  Hot Packs / Cold Pack Modalities  E-Stim  Phonophoresis with Ketoprofen  Iontophoresis with Dexamethasone  Mechanical Traction    Thank you for your referral to [facility].  Please call with any questions, concerns or comments.  [phone]

## 2017-12-28 NOTE — TELEPHONE ENCOUNTER
Patient Information     Patient Name MRN Sex George Negron [id number] Female 1995      Telephone Encounter by Christi Mendoza at 10/10/2017  3:48 PM     Author:  Christi Mendoza Service:  (none) Author Type:  (none)     Filed:  10/10/2017  3:49 PM Encounter Date:  10/9/2017 Status:  Signed     :  Christi Mendoza            Faxed to BELGICA Mendoza LPN ....................10/10/2017  3:48 PM

## 2017-12-28 NOTE — TELEPHONE ENCOUNTER
Patient Information     Patient Name MRN Sex George Negron [id number] Female 1995      Telephone Encounter by Mine Bustos at 2017  1:35 PM     Author:  Mine Bustos Service:  (none) Author Type:  (none)     Filed:  2017  1:35 PM Encounter Date:  2017 Status:  Signed     :  Mine Bustos            Patient was in the ER on 17 for an injury.  Please review and order physical therapy is appropriate.  Mine Bustos CMA (AAMA)................ 2017 1:35 PM

## 2017-12-28 NOTE — TELEPHONE ENCOUNTER
Patient Information     Patient Name MRN Sex George Negron [id number] Female 1995      Telephone Encounter by Cyn Avendaño MD at 2017 11:45 AM     Author:  Cyn Avendaño MD Service:  (none) Author Type:  Physician     Filed:  2017 11:46 AM Encounter Date:  2017 Status:  Signed     :  Cyn Avendaño MD (Physician)            I placed the order for the Quantiferon TB Gold test for her.  Cyn Avendaño MD ....................  2017   11:46 AM

## 2017-12-28 NOTE — TELEPHONE ENCOUNTER
Patient Information     Patient Name MRN George Laughlin [id number] Female 1995      Telephone Encounter by Lucila Briceno at 2017  8:38 AM     Author:  Lucila Briceno Service:  (none) Author Type:  (none)     Filed:  2017  8:40 AM Encounter Date:  2017 Status:  Signed     :  Lucila Briceno            Patient was wondering about her restrictions for work comp.  She said that they were changed but is needing explanation on this.      Lucila Briceno ....................  2017   8:39 AM

## 2017-12-28 NOTE — PROGRESS NOTES
Patient Information     Patient Name MRN Sex George Negron [id number] Female 1995      Progress Notes by Man Solano, PT at 7/10/2017  4:36 PM     Author:  Man Solano, PT Service:  (none) Author Type:  PT- Physical Therapist     Filed:  7/10/2017  4:40 PM Date of Service:  7/10/2017  4:36 PM Status:  Signed     :  Man Solano PT (PT- Physical Therapist)            [facility]  Outpatient PT - Daily Note    Date of Service: 7/10/2017   Visit# 4 (4 of 10)    Patient Name: George Mcknight   YOB: 1995   Referring MD/Provider: Dr. Jarocho BETANCUR  Medical and Treatment Diagnosis: Strain of thoracic region  PT Treatment Diagnosis: Same  Start of Service: 17  Certification Dates: Start of Service: 17  Medicare/MA Re-Cert Due: 17     Living Situations:  Independent in Living Situation      Cognition:  Oriented to Person, Place, and Time.   Precautions:  Hx of cervical spine injury and TBI/concusions from hockey.  Lifting restriction at work.    Subjective      Patient reports pain is slowly improving.  She feels that she is functioning at 80% of her normal daily activities.  She is; however, avoiding all lifting/carrying activities at this time.  It is difficult to gauge this activity.    Current Symptoms:    Pain Rating:     3 = Mild Pain, (Bothersome, Annoying, Irritating, Nagging) and  5 = Moderate Pain, (Aggravating, Grueling, Upsetting, Frustrating) at its worst.  Night pain reported.  5/10 during the day.  Pain Location - thoracic spine  Decreased Motion - bending  Weakness - denies specific weakness of LE's.  Swelling - denies  Tingling/Numbness - yes, left anterior thigh to knee        Objective      Today's Intervention:    Reviewed HEP    Continuous Ultrasound was applied to thoracic and lumbar paraspinals.  Parameters: 1.5 w/cm2 x 20 min.    MT:  STM/DTM to lumbar and lower thoracic paraspinals.  IASTM using G4 to same  region.    TE:  Lumbar flexion stretch  Cat/camel  Supine 90/90 core stab  Hip stretching  Clam    Response to Intervention:  Good tolerance to treatment     Home Exercise Program:    Liz pose for spine flexion  Cat/camel  90/90 hip lift with core stab  Hip stretching  Side lying reach and roll  Clam    Assessment    Therapist Assessment / Clinical Impression:  Thoracic strain. Left leg radiculopathy.    Rehab Prognosis: Good    Functional Impairment(s): See subjective on initial evaluation and Functional Assessment listed below.      Oswestry Disability Questionnaire:  Total Score:  26/50  Impairment Ratin%     Pain Intensity:  4/5   Standing:   3/5   Personal Cares: 1/5   Sleepin5   Liftin   Social Life:   2   Walkin5   Travelin/5   Sittin/5   Employment/Homemaking:    3/5    G codes and Modifier based on Oswestry score:       Current Primary G Code and Modifier:    Per the Patient's intake and/or assessment the Primary G Code is: Handling Objects .   The Patient's Impairment, Limitation or Restriction Modifier would be best described as: CK - 40% - 60% Impairment.     Goal Primary G Code and Modifier:    The Patient's G Code Goal would be: Handling Objects    The Patient's Impairment, Limitation or Restriction Modifier goal would be best described as: CI - 1% - 20% Impairment.       Goals:  Patient goal:   Decrease pain at night to be able to sleep comfortably.  Return to all work activities without pain.    Short term goals: ( 4 weeks)    Patient will be able to tolerate sitting with less than 3/10 pain up to 60 minutes     Patient will be able to sleep 6 hours with minimal to no disruptions due to pain    Patient will demonstrate spine mobility all within functional limits to allow reaching items on floor with minimal to no pain       Long term goals: ( 8 weeks)    Patient will be independent in their Home Exercise Program    Minimal to no difficulty  standing with meal preparation for 30 minutes    Minimal to no difficulty lifting/carrying objects weighing 50 lbs     Minimal to no difficulty with 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      Patient participated in goal selection and understand(s) the plan of care: Yes   Patient Potential for Achieving Desired Outcome: Good     Plan    Treatment Plan:      Frequency:   12 visits    Duration of Treatment: 6 weeks    Planned Interventions:    Home Exercise Program development  Therapeutic Exercise (ROM & Strengthening)  Neuromuscular Re-education  Manual Therapy  Ultrasound  Vasopneumatic Compression with Cold Therapy ( Game Ready )  Hot Packs / Cold Pack Modalities  E-Stim  Phonophoresis with Ketoprofen  Iontophoresis with Dexamethasone  Mechanical Traction    Thank you for your referral to [facility].  Please call with any questions, concerns or comments.  [phone]

## 2017-12-28 NOTE — PROGRESS NOTES
Patient Information     Patient Name MRN Sex George Negron [id number] Female 1995      Progress Notes by Man Solano, PT at 2017  1:00 PM     Author:  Man Solano, PT Service:  (none) Author Type:  PT- Physical Therapist     Filed:  2017  2:30 PM Date of Service:  2017  1:00 PM Status:  Signed     :  Man Solano PT (PT- Physical Therapist)            [facility]  Outpatient PT - Daily Note    Date of Service: 2017   Visit# 6 (6 of 10)    Patient Name: George Mcknight   YOB: 1995   Referring MD/Provider: Dr. Jarocho BETANCUR  Medical and Treatment Diagnosis: Strain of thoracic region  PT Treatment Diagnosis: Same  Start of Service: 17  Certification Dates: Start of Service: 17  Medicare/MA Re-Cert Due: 17     Living Situations:  Independent in Living Situation      Cognition:  Oriented to Person, Place, and Time.   Precautions:  Hx of cervical spine injury and TBI/concusions from hockey.  Lifting restriction at work.    Subjective      Patient reports pain is slowly improving.  She feels that she is functioning at 80% of her normal daily activities.  After working 5 days in a row she reports having 8/10 pain yesterday.  She is using a lidocaine patch and aleve for pain control.  Using cold packs following work.    Current Symptoms:    Pain Rating:     3 = Mild Pain, (Bothersome, Annoying, Irritating, Nagging) and  5 = Moderate Pain, (Aggravating, Grueling, Upsetting, Frustrating) currently  Pain Location - thoracic spine  Decreased Motion - bending  Weakness - denies specific weakness of LE's.  Swelling - denies  Tingling/Numbness - yes, left anterior thigh to knee        Objective      Today's Intervention:    Reviewed HEP    Continuous Ultrasound was applied to thoracic and lumbar paraspinals.  Parameters: 1.5 w/cm2 x 20 min.    MT:  STM/DTM to lumbar and lower thoracic paraspinals.  IASTM using G4 to same  region.    TE:  Reviewed HEP.  Continue as prescribed.    Response to Intervention:  Good tolerance to treatment     Home Exercise Program:    Liz pose for spine flexion  Cat/camel  90/90 hip lift with core stab  Hip stretching  Side lying reach and roll  Clam    Assessment    Therapist Assessment / Clinical Impression:  Thoracic strain. Left leg radiculopathy.    Rehab Prognosis: Good    Functional Impairment(s): See subjective on initial evaluation and Functional Assessment listed below.      Oswestry Disability Questionnaire:  Total Score:  26/50  Impairment Ratin%     Pain Intensity:  4/5   Standing:   3/5   Personal Cares: 1/5   Sleepin/5   Liftin/5   Social Life:   2/5   Walkin/5   Travelin/5   Sittin/5   Employment/Homemaking:    3    G codes and Modifier based on Oswestry score:       Current Primary G Code and Modifier:    Per the Patient's intake and/or assessment the Primary G Code is: Handling Objects .   The Patient's Impairment, Limitation or Restriction Modifier would be best described as: CK - 40% - 60% Impairment.     Goal Primary G Code and Modifier:    The Patient's G Code Goal would be: Handling Objects    The Patient's Impairment, Limitation or Restriction Modifier goal would be best described as: CI - 1% - 20% Impairment.       Goals:  Patient goal:   Decrease pain at night to be able to sleep comfortably.  Return to all work activities without pain.    Short term goals: ( 4 weeks)    Patient will be able to tolerate sitting with less than 3/10 pain up to 60 minutes     Patient will be able to sleep 6 hours with minimal to no disruptions due to pain    Patient will demonstrate spine mobility all within functional limits to allow reaching items on floor with minimal to no pain       Long term goals: ( 8 weeks)    Patient will be independent in their Home Exercise Program    Minimal to no difficulty standing with meal preparation for 30  minutes    Minimal to no difficulty lifting/carrying objects weighing 50 lbs     Minimal to no difficulty with 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      Patient participated in goal selection and understand(s) the plan of care: Yes   Patient Potential for Achieving Desired Outcome: Good     Plan    Continue PT to decrease pain and improve spine mobility.    Treatment Plan:      Frequency:   12 visits    Duration of Treatment: 6 weeks    Planned Interventions:    Home Exercise Program development  Therapeutic Exercise (ROM & Strengthening)  Neuromuscular Re-education  Manual Therapy  Ultrasound  Vasopneumatic Compression with Cold Therapy ( Game Ready )  Hot Packs / Cold Pack Modalities  E-Stim  Phonophoresis with Ketoprofen  Iontophoresis with Dexamethasone  Mechanical Traction    Thank you for your referral to [facility].  Please call with any questions, concerns or comments.  [phone]

## 2017-12-28 NOTE — TELEPHONE ENCOUNTER
"Patient Information     Patient Name MRN Sex George Negron [id number] Female 1995      Telephone Encounter by Cyn Avendaño MD at 2017 10:11 AM     Author:  Cyn Avendaño MD Service:  (none) Author Type:  Physician     Filed:  2017 10:13 AM Encounter Date:  2017 Status:  Signed     :  Cyn Avendaño MD (Physician)            I don't see that this was ever even drawn here (lab still says \"future\").  Did she have the lab drawn elsewhere?  If so, I don't see that it was faxed here either.  Could you please try to track this down?  Cyn Avendaño MD ....................  2017   10:12 AM         "

## 2017-12-28 NOTE — TELEPHONE ENCOUNTER
Patient Information     Patient Name MRN George Laughlin [id number] Female 1995      Telephone Encounter by Shannon Hendrix at 2017 11:21 AM     Author:  Shannon Hendrix Service:  (none) Author Type:  (none)     Filed:  2017 11:22 AM Encounter Date:  2017 Status:  Signed     :  Shannon Hendrix            Left message to call back.  Shannon Hendrix LPN ....................  2017   11:21 AM

## 2017-12-28 NOTE — TELEPHONE ENCOUNTER
Patient Information     Patient Name MRN Sex George Negron [id number] Female 1995      Telephone Encounter by Roxann Rutledge at 2017 10:28 AM     Author:  Roxann Rutledge Service:  (none) Author Type:  (none)     Filed:  2017 10:28 AM Encounter Date:  2017 Status:  Signed     :  Roxann Rutledge            [facility] REQUESTING ORDERS FOR PT.  Roxann Rutledge ....................  2017   10:28 AM

## 2017-12-28 NOTE — TELEPHONE ENCOUNTER
Patient Information     Patient Name MRN Sex George Negron [id number] Female 1995      Telephone Encounter by Betzy Victoria RN at 2017  9:36 AM     Author:  Betzy Victoria RN Service:  (none) Author Type:  NURS- Registered Nurse     Filed:  2017  9:41 AM Encounter Date:  2017 Status:  Signed     :  Betzy Victoria RN (NURS- Registered Nurse)            This is a Refill request from: [place]  Name of Medication: ramelteon (ROZEREM) 8 mg tablet  Quantity requested:30 x 5   Last fill date: 2017  Due for refill: yes  Last visit with HARDY CUEVAS was on: 2017   Controlled Substance Agreement:2017  Diagnosis r/t this medication request:Insomnia     Unable to complete prescription refill per RN Medication Refill Policy.................... Betzy Victoria RN ....................  2017   9:36 AM

## 2017-12-30 NOTE — INITIAL ASSESSMENTS
Patient Information     Patient Name MRN Sex George Negron [id number] Female 1995      Initial Assessments by Man Solano PT at 2017  5:12 PM     Author:  Man Solano PT Service:  (none) Author Type:  PT- Physical Therapist     Filed:  7/3/2017  4:42 PM Date of Service:  2017  5:12 PM Status:  Signed     :  Man Solano PT (PT- Physical Therapist)            [facility]  Outpatient PT - Initial Evaluation    Date of Service: 2017   Visit# 1 (1 of 10)    Patient Name: George Mcknight   YOB: 1995   Referring MD/Provider: Dr. Jarocho BETANCUR  Medical and Treatment Diagnosis: Strain of thoracic region  PT Treatment Diagnosis: Same  Start of Service: 17  Certification Dates: Start of Service: 17  Medicare/MA Re-Cert Due: 17     Living Situations:  Independent in Living Situation      Cognition:  Oriented to Person, Place, and Time.   Precautions:  Hx of cervical spine injury and TBI/concusions from hockey.  Lifting restriction at work.    Subjective      Patient is a 20 y/o female with primary complaints of thoracic spine pain.  Secondary complaints of left leg tingling and numbness radiating to the left knee.  Patient reports she was at work and was assisting a client when transferring to a wheelchair.  She developed immediate pain and was seen in the ER.  See notes for further details.    Current functional difficulties and activity limitations include:  Sleep, bending, lifting, pushing/pulling.    Prior Level:  No difficulty completing the above functional activities prior to onset       Occupation:  CNA at [employer]    Current Symptoms:    Pain Ratin = Severe Pain, (Miserable, Gnawing, Fierce, Piercing) at its worst.  Night pain reported.  5/10 during the day.  Pain Location - thoracic spine  Decreased Motion - bending  Weakness - denies specific weakness of LE's.  Swelling -  denies  Tingling/Numbness - yes, left anterior thigh to knee      Past Medical History:     Diagnosis  Date     ADD (attention deficit disorder)      Convergence insufficiency 2012    as a result of TBI - had to wear prism glasses for about 1 year.      Depression with anxiety      Migraines      TBI (traumatic brain injury) (HC) 12/30/2012    playing high school hockey.      No past surgical history on file.    Previous Treatment:    Pain Meds / Anti-inflammatory Meds  - yes Aleve/ibuprofen  Physical Therapy - none for recent injury  Chiropractic - no  Injections - no  Surgery - no    Diagnostics:  Reviewed (see chart)   Current Medications:  Reviewed (see chart)    Drug Allergies:  Reviewed (see chart)    Objective    Fall Risk Screening:  No risk factors identified.      Observation/posture/gait:  Normal pace, no antalgic gait observed.    ROM/Strength:     Cerv Lumbar Myotomes    L    R     L    R   Flexion      25% limited C4 Shoulder Elev.     L2 Hip Flexion 5/5 5/5   Extension      WFL but painful C5 Shoulder Abd.     L3 Knee Ext.      5/5 5/5   Left Lat.   Flex       C6 Elbow Flexion     L4 Ankle DF 5/5 5/5   Right Lat. Flex       C7 Elbow Ext.     L5 1st MTP Ext. 5/5 5/5   Left   Rotation        25% C8 Finger Flex     S1 Ankle P.F. 5/5 5/5   Right Rotation      25% T1 Finger Abd.     S2 Knee Flexion 5/5 5/5             Hip Abduction 4/5 4/5                  Special Tests:  SLR negative for increased LBP, Slump test positive for dural tension    Today's Intervention:    Instructed in beginning HEP to improve spine ROM and core stab   Education in spine posture with sitting and standing    Response to Intervention:  Good tolerance to treatment     Home Exercise Program:    Liz pose for spine flexion  Cat/camel  90/90 hip lift with core stab  Hip stretching  Side lying reach and roll    Assessment    Therapist Assessment / Clinical Impression:  Thoracic strain. Left leg radiculopathy.    Rehab Prognosis:  Good    Functional Impairment(s): See subjective on initial evaluation and Functional Assessment listed below.      Oswestry Disability Questionnaire:  Total Score:  26/50  Impairment Ratin%     Pain Intensity:  4/5   Standing:   3/5   Personal Cares: 1/5   Sleepin/5   Liftin/5   Social Life:   2/5   Walkin/5   Travelin/5   Sittin/5   Employment/Homemaking:    3/5    G codes and Modifier based on Oswestry score:       Current Primary G Code and Modifier:    Per the Patient's intake and/or assessment the Primary G Code is: Handling Objects .   The Patient's Impairment, Limitation or Restriction Modifier would be best described as: CK - 40% - 60% Impairment.     Goal Primary G Code and Modifier:    The Patient's G Code Goal would be: Handling Objects    The Patient's Impairment, Limitation or Restriction Modifier goal would be best described as: CI - 1% - 20% Impairment.       Goals:  Patient goal:   Decrease pain at night to be able to sleep comfortably.  Return to all work activities without pain.    Short term goals: ( 4 weeks)    Patient will be able to tolerate sitting with less than 3/10 pain up to 60 minutes     Patient will be able to sleep 6 hours with minimal to no disruptions due to pain    Patient will demonstrate spine mobility all within functional limits to allow reaching items on floor with minimal to no pain       Long term goals: ( 8 weeks)    Patient will be independent in their Home Exercise Program    Minimal to no difficulty standing with meal preparation for 30 minutes    Minimal to no difficulty lifting/carrying objects weighing 50 lbs     Minimal to no difficulty with 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      Patient participated in goal selection and understand(s) the plan of care: Yes   Patient Potential for Achieving Desired Outcome: Good     Plan    Treatment Plan:      Frequency:   12 visits    Duration of Treatment: 6  weeks    Planned Interventions:    Home Exercise Program development  Therapeutic Exercise (ROM & Strengthening)  Neuromuscular Re-education  Manual Therapy  Ultrasound  Vasopneumatic Compression with Cold Therapy ( Game Ready )  Hot Packs / Cold Pack Modalities  E-Stim  Phonophoresis with Ketoprofen  Iontophoresis with Dexamethasone  Mechanical Traction    Thank you for your referral to [facility].  Please call with any questions, concerns or comments.  [phone]

## 2018-01-04 NOTE — PROGRESS NOTES
Patient Information     Patient Name MRN Sex George Negron [id number] Female 1995      Progress Notes by Cyn Avendaño MD at 3/24/2017  2:15 PM     Author:  Cyn Avendaño MD Service:  (none) Author Type:  Physician     Filed:  3/25/2017  2:00 PM Encounter Date:  3/24/2017 Status:  Signed     :  yCn Avendaño MD (Physician)            SUBJECTIVE:    George Mcknight is a 21 y.o. female who presents to [facility]. She has a history of traumatic brain injury several years ago playing hockey in high school. After she had this injury, she was having more problems with depression and anxiety. She wasn't able to play high school hockey anymore, which was very difficult for her. She is that she always had some attention deficit types of symptoms, but after her TBI became more apparent that this was an issue. She has been on Adderall XR and Adderall immediate release for several years. This has worked well for her to continue doing well in school. She is also continued to take fluoxetine to help with her depression and anxiety symptoms. She also reports that she's had difficulty sleeping for quite some time. She had been on trazodone most recently. She has tried both at 50 and 100 mg doses. Neither of these really worked that consistently. She also had tried Lunesta in the past which gave her weird dreams. She also thinks she took Restoril in the past which she thinks did not work well. She is wondering if there is something else that she could try to help her with sleep.    When she gets out of the shower, she notices her leg look purple/red.  Hand and toes get cold easily.  Her right 5th finger turns white at times when she is cold.  No family history of rheumatologic conditions.     HPI  I personally reviewed medications/allergies/history listed below:     Allergies      Allergen   Reactions     Cats (Fur, Dander, Saliva)  Edema     Swelling of face/eyes/throat      Other  "[Unlisted Allergen (Include Detail In Comments)]  Other - Describe In Comment Field     Trees - itchy    , No family history on file.,   No current outpatient prescriptions on file prior to visit.     No current facility-administered medications on file prior to visit.    ,   Past Medical History      Diagnosis   Date     ADD (attention deficit disorder)       Convergence insufficiency  2012     as a result of TBI - had to wear prism glasses for about 1 year.      Depression with anxiety       Migraines       TBI (traumatic brain injury) (HC)  12/30/2012     playing high school hockey.    ,   Patient Active Problem List     Diagnosis  Code     Controlled substance agreement signed 3/24/17 Z79.899    and No past surgical history on file.  Social History     Social History        Marital status:  Single     Spouse name: N/A     Number of children:  N/A     Years of education:  N/A     Occupational History      Not on file.     Social History Main Topics         Smoking status:   Never Smoker     Smokeless tobacco:   Never Used     Alcohol use   Yes      Comment: a few times a year      Drug use:   No     Sexual activity:   Not Currently     Other Topics  Concern     Not on file      Social History Narrative     Student at [employer].  Studying toward her BSN.  Family moved from [place], [place] fall 2016.  Both parents grew up in the [place] [place].    Mom - Noel is RN, Clinic Nurse Manager at [employer].    Dad - Armando    Sister - Marianna is  and works as an RN at [employer] in [place].      REVIEW OF SYSTEMS:  Review of Systems   All other systems reviewed and are negative.      OBJECTIVE:  /68  Temp 97.9  F (36.6  C) (Tympanic)  Ht 1.613 m (5' 3.5\")  Wt 52.4 kg (115 lb 9.6 oz)  LMP 03/15/2017  Breastfeeding? No  BMI 20.16 kg/m2    EXAM:   Physical Exam   Constitutional: She is oriented to person, place, and time and well-developed, well-nourished, and in no distress.   HENT:   Head: Normocephalic.   Right Ear: " External ear normal.   Left Ear: External ear normal.   Nose: Nose normal.   Mouth/Throat: Oropharynx is clear and moist.   Eyes: Pupils are equal, round, and reactive to light.   Neck: Normal range of motion. Neck supple. No thyromegaly present.   Cardiovascular: Normal rate, regular rhythm and normal heart sounds.    No murmur heard.  Pulmonary/Chest: Effort normal and breath sounds normal. No respiratory distress. She has no wheezes. She has no rales.   Breast exam: No masses palpable. No skin changes, tethering, axillary lymphadenopathy bilaterally.   Abdominal: Soft. Bowel sounds are normal. She exhibits no distension and no mass. There is no tenderness. There is no rebound and no guarding.   Genitourinary:   Genitourinary Comments: Pelvic/rectal exam deferred.   Musculoskeletal: Normal range of motion. She exhibits no edema.   Lymphadenopathy:     She has no cervical adenopathy.   Neurological: She is alert and oriented to person, place, and time. No cranial nerve deficit.   Skin: Skin is warm and dry.   Psychiatric: Affect normal.   PHQ Depression Screen  Date of PHQ exam: 17  Over the last 2 weeks, how often have you been bothered by any of the following problems?  1. Little interest or pleasure in doing things: 0 - Not at all  2. Feeling down, depressed, or hopeless: 0 - Not at all  3. Trouble falling or staying asleep, or sleeping too much: 3 - Nearly every day  4. Feeling tired or having little energy: 2 - More than half the days  5. Poor appetite or overeatin - Not at all  6. Feeling bad about yourself - or that you are a failure or have let yourself or your family down: 0 - Not at all  7. Trouble concentrating on things, such as reading the newspaper or watching television: 0 - Not at all  8. Moving or speaking so slowly that other people could have noticed. Or the opposite - being so fidgety or restless that you have been moving around a lot more than usual: 1 - Several days  9. Thoughts that  you would be better off dead, or of hurting yourself in some way: 0 - Not at all    PHQ-9 TOTAL SCORE: 6  Depression Severity Level: mild  If any answers were positive, how difficult have these problems made it for you to do your work, take care of things at home, or get along with other people: somewhat difficult             PHQ Depression Screen  Date of PHQ exam: 17  Over the last 2 weeks, how often have you been bothered by any of the following problems?  1. Little interest or pleasure in doing things: 0 - Not at all  2. Feeling down, depressed, or hopeless: 0 - Not at all  3. Trouble falling or staying asleep, or sleeping too much: 3 - Nearly every day  4. Feeling tired or having little energy: 2 - More than half the days  5. Poor appetite or overeatin - Not at all  6. Feeling bad about yourself - or that you are a failure or have let yourself or your family down: 0 - Not at all  7. Trouble concentrating on things, such as reading the newspaper or watching television: 0 - Not at all  8. Moving or speaking so slowly that other people could have noticed. Or the opposite - being so fidgety or restless that you have been moving around a lot more than usual: 1 - Several days  9. Thoughts that you would be better off dead, or of hurting yourself in some way: 0 - Not at all    PHQ-9 TOTAL SCORE: 6  Depression Severity Level: mild  If any answers were positive, how difficult have these problems made it for you to do your work, take care of things at home, or get along with other people: somewhat difficult      HECTOR Score and Severity  HECTOR-7 SCORE: 2  ANXIETY SEVERITY LEVEL: none      ASSESSMENT/PLAN:    ICD-10-CM    1. ADD (attention deficit disorder) F98.8 dextroamphetamine-amphetamine (ADDERALL XR) 25 mg capsule      dextroamphetamine-amphetamine (ADDERALL) 10 mg tablet      COMPLIANCE DRUG ANALYSIS      COMPLIANCE DRUG ANALYSIS      DISCONTINUED: dextroamphetamine-amphetamine (ADDERALL XR) 25 mg capsule       DISCONTINUED: dextroamphetamine-amphetamine (ADDERALL) 10 mg tablet      DISCONTINUED: dextroamphetamine-amphetamine (ADDERALL XR) 25 mg capsule      DISCONTINUED: dextroamphetamine-amphetamine (ADDERALL) 10 mg tablet   2. Controlled substance agreement signed 3/24/17 Z79.899    3. Other migraine without status migrainosus, not intractable G43.809 SUMAtriptan (IMITREX) 50 mg tablet   4. Depression with anxiety F41.8 FLUoxetine (PROZAC) 10 mg capsule      FLUoxetine (PROZAC) 40 mg capsule   5. Insomnia, unspecified type G47.00 ramelteon (ROZEREM) 8 mg tablet   6. Need for HPV vaccination Z23 GARDASIL 9      ND ADMIN EA ADDL VACC   7. Need for hepatitis A immunization Z23 HEP A VACCINE ADULT IM      ND ADMIN VACC INITIAL      CANCELED: HEP A VACC PED/ADOL 2 DOSE IM   8. Healthcare maintenance Z00.00         Plan:    1. Refilled Adderall Adderall XR 25 mg daily #30×3 and Adderall immediate release 10 mg #30×3.  website printout reviewed and scanned today. Contract signed today. Toxasure sent today. Follow-up in 3 months.  2. See #1.  3. Refilled Imitrex 2 mg by mouth when necessary migraine #9 with when necessary refills.  4. Refilled Prozac. She is taking 10 mg +40 mg daily to equal 50 mg daily.  5. Trial of Rozerem 8 mg by mouth daily at bedtime when necessary. As above, she is already tried Lunesta, trazodone and Restoril. She is also tried over-the-counter agents including melatonin and Benadryl.  6. Gardasil #2 given today.  7. Hepatitis A #2 given today as well.  8. She is not yet due for a Pap. Declined any STD testing today. No other labs desired at this point.  Cyn Avendaño MD

## 2018-01-05 NOTE — TELEPHONE ENCOUNTER
Patient Information     Patient Name MRN Sex George Negron [id number] Female 1995      Telephone Encounter by Martina Mays RN at 2017  4:48 PM     Author:  Martina Mays RN Service:  (none) Author Type:  NURS- Registered Nurse     Filed:  2017  4:50 PM Encounter Date:  2017 Status:  Signed     :  Martina Mays RN (NURS- Registered Nurse)            Patient called mother with pain in ear after being cleaned out. Ear is throbbing and has pulsating pressure. Patient having a hard time hearing. Dr. Leslie had looked at ear and stated that she could call back if there was any other problems and she would send an RX in to pharmacy for her. Requesting RX be sent to [facility] pharmacy. MARTINA MAYS RN ....................  2017   4:49 PM

## 2018-01-05 NOTE — TELEPHONE ENCOUNTER
Patient Information     Patient Name MRN George Laughlin [id number] Female 1995      Telephone Encounter by María Elena Leslie MD at 2017  5:17 PM     Author:  María Elena Leslie MD Service:  (none) Author Type:  Physician     Filed:  2017  5:18 PM Encounter Date:  2017 Status:  Signed     :  María Elena Leslie MD (Physician)            Prescription written.  Signed by María Elena Leslie MD .....2017 5:18 PM

## 2018-01-08 ENCOUNTER — AMBULATORY - GICH (OUTPATIENT)
Dept: FAMILY MEDICINE | Facility: OTHER | Age: 23
End: 2018-01-08

## 2018-01-08 DIAGNOSIS — F41.8 OTHER SPECIFIED ANXIETY DISORDERS: ICD-10-CM

## 2018-01-09 ENCOUNTER — COMMUNICATION - GICH (OUTPATIENT)
Dept: FAMILY MEDICINE | Facility: OTHER | Age: 23
End: 2018-01-09

## 2018-01-09 DIAGNOSIS — F98.8 OTHER SPECIFIED BEHAVIORAL AND EMOTIONAL DISORDERS WITH ONSET USUALLY OCCURRING IN CHILDHOOD AND ADOLESCENCE: ICD-10-CM

## 2018-01-15 ENCOUNTER — OFFICE VISIT - GICH (OUTPATIENT)
Dept: FAMILY MEDICINE | Facility: OTHER | Age: 23
End: 2018-01-15

## 2018-01-15 ENCOUNTER — HISTORY (OUTPATIENT)
Dept: FAMILY MEDICINE | Facility: OTHER | Age: 23
End: 2018-01-15

## 2018-01-15 DIAGNOSIS — F98.8 OTHER SPECIFIED BEHAVIORAL AND EMOTIONAL DISORDERS WITH ONSET USUALLY OCCURRING IN CHILDHOOD AND ADOLESCENCE: ICD-10-CM

## 2018-01-15 DIAGNOSIS — G47.00 INSOMNIA: ICD-10-CM

## 2018-01-15 DIAGNOSIS — F41.8 OTHER SPECIFIED ANXIETY DISORDERS: ICD-10-CM

## 2018-01-15 ASSESSMENT — ANXIETY QUESTIONNAIRES
4. TROUBLE RELAXING: SEVERAL DAYS
5. BEING SO RESTLESS THAT IT IS HARD TO SIT STILL: SEVERAL DAYS
2. NOT BEING ABLE TO STOP OR CONTROL WORRYING: MORE THAN HALF THE DAYS
GAD7 TOTAL SCORE: 8
6. BECOMING EASILY ANNOYED OR IRRITABLE: NOT AT ALL
7. FEELING AFRAID AS IF SOMETHING AWFUL MIGHT HAPPEN: NOT AT ALL
3. WORRYING TOO MUCH ABOUT DIFFERENT THINGS: MORE THAN HALF THE DAYS
1. FEELING NERVOUS, ANXIOUS, OR ON EDGE: MORE THAN HALF THE DAYS

## 2018-01-15 ASSESSMENT — PATIENT HEALTH QUESTIONNAIRE - PHQ9: SUM OF ALL RESPONSES TO PHQ QUESTIONS 1-9: 5

## 2018-01-16 PROBLEM — F41.8 DEPRESSION WITH ANXIETY: Status: ACTIVE | Noted: 2017-03-25

## 2018-01-16 PROBLEM — Z79.899 CONTROLLED SUBSTANCE AGREEMENT SIGNED: Status: ACTIVE | Noted: 2017-03-24

## 2018-01-16 PROBLEM — F98.8 ADD (ATTENTION DEFICIT DISORDER): Status: ACTIVE | Noted: 2017-03-25

## 2018-01-16 PROBLEM — G47.00 INSOMNIA: Status: ACTIVE | Noted: 2017-03-25

## 2018-01-16 PROBLEM — G43.909 MIGRAINE WITHOUT STATUS MIGRAINOSUS, NOT INTRACTABLE: Status: ACTIVE | Noted: 2017-03-25

## 2018-01-16 RX ORDER — ZOLPIDEM TARTRATE 10 MG/1
TABLET ORAL
COMMUNITY
Start: 2017-12-07 | End: 2018-08-24

## 2018-01-16 RX ORDER — RAMELTEON 8 MG/1
8 TABLET ORAL
COMMUNITY
Start: 2017-09-28 | End: 2018-09-14

## 2018-01-16 RX ORDER — SUMATRIPTAN 50 MG/1
50 TABLET, FILM COATED ORAL
COMMUNITY
Start: 2017-03-24 | End: 2018-03-29

## 2018-01-16 RX ORDER — DEXTROAMPHETAMINE SACCHARATE, AMPHETAMINE ASPARTATE, DEXTROAMPHETAMINE SULFATE AND AMPHETAMINE SULFATE 2.5; 2.5; 2.5; 2.5 MG/1; MG/1; MG/1; MG/1
TABLET ORAL
COMMUNITY
Start: 2017-08-17 | End: 2018-09-14

## 2018-01-16 RX ORDER — ALBUTEROL SULFATE 90 UG/1
2 AEROSOL, METERED RESPIRATORY (INHALATION)
COMMUNITY
Start: 2017-03-24 | End: 2018-09-14

## 2018-01-16 RX ORDER — PSEUDOEPHEDRINE HCL 60 MG
60 TABLET ORAL
COMMUNITY
Start: 2017-03-24

## 2018-01-16 RX ORDER — CITALOPRAM HYDROBROMIDE 20 MG/1
20 TABLET ORAL
COMMUNITY
Start: 2018-01-08 | End: 2018-08-24

## 2018-01-16 RX ORDER — FLUOXETINE 10 MG/1
CAPSULE ORAL
COMMUNITY
Start: 2018-01-08 | End: 2018-09-14

## 2018-01-16 RX ORDER — DEXTROAMPHETAMINE SACCHARATE, AMPHETAMINE ASPARTATE MONOHYDRATE, DEXTROAMPHETAMINE SULFATE AND AMPHETAMINE SULFATE 6.25; 6.25; 6.25; 6.25 MG/1; MG/1; MG/1; MG/1
25 CAPSULE, EXTENDED RELEASE ORAL
COMMUNITY
Start: 2017-08-17 | End: 2018-09-14

## 2018-01-25 VITALS
DIASTOLIC BLOOD PRESSURE: 62 MMHG | DIASTOLIC BLOOD PRESSURE: 68 MMHG | WEIGHT: 122 LBS | WEIGHT: 115.6 LBS | BODY MASS INDEX: 21.27 KG/M2 | TEMPERATURE: 97.6 F | BODY MASS INDEX: 19.74 KG/M2 | SYSTOLIC BLOOD PRESSURE: 98 MMHG | SYSTOLIC BLOOD PRESSURE: 110 MMHG | TEMPERATURE: 97.9 F | HEIGHT: 64 IN

## 2018-01-25 VITALS
WEIGHT: 119.8 LBS | BODY MASS INDEX: 20.89 KG/M2 | SYSTOLIC BLOOD PRESSURE: 96 MMHG | DIASTOLIC BLOOD PRESSURE: 78 MMHG | TEMPERATURE: 98.4 F

## 2018-02-01 ASSESSMENT — ANXIETY QUESTIONNAIRES: GAD7 TOTAL SCORE: 2

## 2018-02-01 ASSESSMENT — PATIENT HEALTH QUESTIONNAIRE - PHQ9: SUM OF ALL RESPONSES TO PHQ QUESTIONS 1-9: 6

## 2018-02-09 VITALS
HEIGHT: 64 IN | DIASTOLIC BLOOD PRESSURE: 63 MMHG | BODY MASS INDEX: 21.17 KG/M2 | HEART RATE: 76 BPM | SYSTOLIC BLOOD PRESSURE: 100 MMHG | WEIGHT: 124 LBS

## 2018-02-11 ASSESSMENT — ANXIETY QUESTIONNAIRES: GAD7 TOTAL SCORE: 8

## 2018-02-11 ASSESSMENT — PATIENT HEALTH QUESTIONNAIRE - PHQ9: SUM OF ALL RESPONSES TO PHQ QUESTIONS 1-9: 5

## 2018-02-12 NOTE — TELEPHONE ENCOUNTER
Patient Information     Patient Name MRN Sex George Negron [id number] Female 1995      Telephone Encounter by Cyn Avendaño MD at 2018 10:36 AM     Author:  Cyn Avendaño MD Service:  (none) Author Type:  Physician     Filed:  2018 10:37 AM Encounter Date:  2018 Status:  Signed     :  Cyn Avendaño MD (Physician)            I cannot refill these over the phone per clinic policy.  I understand she has an appointment scheduled on 1/15/18 and will refill at that time.  Cyn Avendaño MD ....................  2018   10:37 AM

## 2018-02-12 NOTE — TELEPHONE ENCOUNTER
Patient Information     Patient Name MRN Sex George Negron [id number] Female 1995      Telephone Encounter by Betzy Victoria RN at 1/10/2018  2:05 PM     Author:  Betzy Victoria RN Service:  (none) Author Type:  NURS- Registered Nurse     Filed:  1/10/2018  2:18 PM Encounter Date:  2018 Status:  Signed     :  Betzy Victoria RN (NURS- Registered Nurse)            Patient unavailable via phone. Unable to leave message. No identifiers.   This is a Refill request from: [facility]  Name of Medication: Adderall XR 25 mg and Adderall 10 mg  Quantity requested: 30  Last fill date: 17  Last visit with HARDY CUEVAS was on: 2017    Controlled Substance Agreement: 2017  Diagnosis r/t this medication request: ADD     Unable to complete prescription refill per RN Medication Refill Policy.................... Betzy Victoria RN ....................  1/10/2018   2:08 PM

## 2018-02-12 NOTE — TELEPHONE ENCOUNTER
Patient Information     Patient Name MRN Sex George Negron [id number] Female 1995      Telephone Encounter by Betzy Victoria RN at 2017  2:28 PM     Author:  Betzy Victoria RN Service:  (none) Author Type:  NURS- Registered Nurse     Filed:  2017  2:35 PM Encounter Date:  2017 Status:  Signed     :  Betzy Victoria RN (NURS- Registered Nurse)            This is a Refill request from: [facility]  Name of Medication: Zolpidem (AMBIEN) 10 mg tablet  Quantity requested: 30  Last fill date: 10/10/2017  Last visit with HARDY CUEVAS was on: 2017   Controlled Substance Agreement: 2017  Diagnosis r/t this medication request: Insomnia, unspecified type     Unable to complete prescription refill per RN Medication Refill Policy.................... Betzy Victoria RN ....................  2017   2:29 PM

## 2018-02-12 NOTE — TELEPHONE ENCOUNTER
Patient Information     Patient Name MRN Sex George Negron [id number] Female 1995      Telephone Encounter by Christi Mendoza at 2017  3:33 PM     Author:  Christi Mendoza Service:  (none) Author Type:  (none)     Filed:  2017  3:33 PM Encounter Date:  2017 Status:  Signed     :  Christi Mendoza            Faxed to [facility] .  Christi Mendoza LPN ....................2017  3:33 PM

## 2018-02-12 NOTE — TELEPHONE ENCOUNTER
Patient Information     Patient Name MRN Sex George Negron [id number] Female 1995      Telephone Encounter by Christi Mendoza at 2018  8:35 AM     Author:  Christi Mendoza Service:  (none) Author Type:  (none)     Filed:  2018  8:35 AM Encounter Date:  2018 Status:  Signed     :  Christi Mendoza            Please advise if the doctor can do this .  Christi Mendoza LPN ....................2018  8:35 AM

## 2018-02-12 NOTE — TELEPHONE ENCOUNTER
Patient Information     Patient Name MRN Sex George Negron [id number] Female 1995      Telephone Encounter by Cyn Avendaño MD at 2017  3:21 PM     Author:  Cyn Avendaño MD Service:  (none) Author Type:  Physician     Filed:  2017  3:22 PM Encounter Date:  2017 Status:  Signed     :  Cyn Avendaño MD (Physician)            Prescription in outbox.  Cyn Avendaño MD ....................  2017   3:21 PM

## 2018-02-13 NOTE — NURSING NOTE
Patient Information     Patient Name MRN Sex George Negron [id number] Female 1995      Nursing Note by Christi Mendoza at 1/15/2018  1:30 PM     Author:  Christi Mendoza Service:  (none) Author Type:  (none)     Filed:  1/15/2018  1:42 PM Encounter Date:  1/15/2018 Status:  Signed     :  Christi Mendoza            Patient is here for medication management and sleep issue .  Christi Mendoza LPN ....................1/15/2018  1:32 PM

## 2018-02-13 NOTE — PROGRESS NOTES
Patient Information     Patient Name MRN Sex George Negron [id number] Female 1995      Progress Notes by Cyn Avendaño MD at 1/15/2018  1:30 PM     Author:  Cyn Avendaño MD Service:  (none) Author Type:  Physician     Filed:  1/15/2018  2:04 PM Encounter Date:  1/15/2018 Status:  Signed     :  Cyn Avendaño MD (Physician)            SUBJECTIVE:    George Mcknight is a 22 y.o. female who presents for refill of her Adderall XR and Adderall IR.  She takes this for ADD symptoms that she has had since having a head injury several years ago.  She is in college at [employer].  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      She has been transitioning from fluoxetine to celexa for her anxiety and depression.  She has noticed recently some increased anxiety.  She has just started the transition from one medication to the other and doesn't know yet if the celexa at its current dose is going to be effective for her.    She also notes that she has been waking up a lot in the middle of the night and can't get back to sleep.    HPI     I personally reviewed medications/allergies/history listed below:      Allergies      Allergen   Reactions     Cats (Fur, Dander, Saliva)  Edema     Swelling of face/eyes/throat      Other [Unlisted Allergen (Include Detail In Comments)]  Other - Describe In Comment Field     Trees - itchy    , No family history on file.,   Current Outpatient Prescriptions on File Prior to Visit       Medication  Sig Dispense Refill     albuterol HFA 90 mcg/actuation inhaler Inhale 2 Puffs by mouth 4 times daily if needed.       cholecalciferol (VITAMIN D) 1,000 unit capsule Take 1 capsule by mouth once daily.       citalopram (CELEXA) 20 mg tablet Take 1 tablet by mouth once daily. 30 tablet 11     FLUoxetine  (PROZAC) 10 mg capsule 2 by mouth daily x 1 week, then 1 by mouth daily x 1 week, then stop. 21 capsule 11     medication order composer ZMA, 2 tablets by mouth at bedtime (zinc, mag, vit B6)       pseudoephedrine (SUDAFED) 60 mg tablet Take 1 tablet by mouth every 6 hours if needed (seasonal allergies).       ROZEREM 8 mg tablet TAKE 1 TABLET BY MOUTH AT BEDTIME 30 tablet 5     SUMAtriptan (IMITREX) 50 mg tablet Take 1 tablet by mouth every 2 hours if needed for Migraine. Max dose: 200mg per 24 hrs. 9 tablet 11     zolpidem (AMBIEN) 10 mg tablet TAKE 1 TABLET BY MOUTH AT BEDTIME AS NEEDED FOR SLEEP. 30 tablet 2     No current facility-administered medications on file prior to visit.    ,   Past Medical History:     Diagnosis  Date     ADD (attention deficit disorder)      Convergence insufficiency 2012    as a result of TBI - had to wear prism glasses for about 1 year.      Depression with anxiety      Migraines      TBI (traumatic brain injury) ([facility]) 12/30/2012    playing high school hockey.    ,   Patient Active Problem List     Diagnosis  Code     Controlled substance agreement signed 3/24/17 Z79.899     ADD (attention deficit disorder) F98.8     Migraine without status migrainosus, not intractable G43.909     Depression with anxiety F41.8     Insomnia G47.00    and No past surgical history on file.  Social History     Social History        Marital status:  Single     Spouse name: N/A     Number of children:  N/A     Years of education:  N/A     Occupational History      Not on file.     Social History Main Topics         Smoking status:   Never Smoker     Smokeless tobacco:   Never Used     Alcohol use   Yes      Comment: a few times a year      Drug use:   No     Sexual activity:   Not Currently     Other Topics  Concern     Not on file      Social History Narrative     Student at [employer].  Studying toward her BSN.  Family moved from [place], [place] fall 2016.  Both parents grew up in the [place] area.    Mom -  "Noel is RN, Clinic Nurse Manager at [employer].    Dad - Armando    Sister - Marianna is  and works as an RN at [employer] in [place].      REVIEW OF SYSTEMS:  Review of Systems   Constitutional: Negative for chills, fever and weight loss.   Neurological: Negative for tremors and headaches.   Psychiatric/Behavioral: Negative for depression, substance abuse and suicidal ideas. The patient is nervous/anxious.        OBJECTIVE:  /63 (Cuff Site: Right Arm, Position: Sitting, Cuff Size: Adult Regular)  Pulse 76  Ht 1.613 m (5' 3.5\")  Wt 56.2 kg (124 lb)  LMP 01/12/2018  BMI 21.62 kg/m2    EXAM:   Physical Exam   Constitutional: She is well-developed, well-nourished, and in no distress.   HENT:   Head: Normocephalic.   Eyes: Pupils are equal, round, and reactive to light.   Neck: Normal range of motion. Neck supple. No thyromegaly present.   Cardiovascular: Normal rate, regular rhythm and normal heart sounds.    No murmur heard.  Pulmonary/Chest: Effort normal and breath sounds normal. No respiratory distress. She has no wheezes. She has no rales.   Lymphadenopathy:     She has no cervical adenopathy.   Psychiatric: Affect normal.       PHQ Depression Screening 6/19/2017 1/15/2018   Date of PHQ exam (doc flow) 6/19/2017 -   1. Lack of interest/pleasure 0 - Not at all 1 - Several days   2. Feeling down/depressed 0 - Not at all 1 - Several days   PHQ-2 TOTAL SCORE 0 2   3. Trouble sleeping - 1 - Several days   4. Decreased energy - 1 - Several days   5. Appetite change - 0 - Not at all   6. Feelings of failure - 0 - Not at all   7. Trouble concentrating - 1 - Several days   8. Activity level - 0 - Not at all   9. Hurting yourself - 0 - Not at all   PHQ-9 TOTAL SCORE - 5   PHQ-9 Severity Level - mild   Functional Impairment - -   Some recent data might be hidden       HECTOR-7 ANXIETY SCREENING 3/24/2017 1/15/2018   HECTOR date (doc flow) 3/24/2017 -   Nervous, anxious 1 2   Cannot stop worrying 0 2   Worry about " different things 1 2   Cannot relax 0 1   Feeling restless 0 1   Easily annoyed/irritated 0 0   Afraid of awful event 0 0   Score 2 8   Severity none mild anxiety   Some recent data might be hidden           ASSESSMENT/PLAN:    ICD-10-CM    1. Attention deficit disorder, unspecified hyperactivity presence F98.8 dextroamphetamine-amphetamine (ADDERALL XR) 25 mg capsule      dextroamphetamine-amphetamine (ADDERALL XR) 25 mg capsule      dextroamphetamine-amphetamine (ADDERALL XR) 25 mg capsule      dextroamphetamine-amphetamine (ADDERALL) 10 mg tablet      dextroamphetamine-amphetamine (ADDERALL) 10 mg tablet      dextroamphetamine-amphetamine (ADDERALL) 10 mg tablet   2. Insomnia, unspecified type G47.00 zolpidem CR (AMBIEN CR) 12.5 mg Extended-Release tablet   3. Depression with anxiety F41.8         Plan:    1.  Adderal XR 25 mg #30x3 and Adderal immediate release 10 mg #30x3 refilled today. [facility] website printout reviewed and scanned today.  Contract and toxassure both last completed on 3/24/17.  Follow up in 3 months.  2.  Switch from regular Ambien to Ambien CR 12.5 mg oral at bedtime as needed for sleep.  #30 with 2 additional refills given.  She has also tried Lunesta in the past (didn't work), Restoril (caused vivid weird dreams), Trazodone (didn't work).   3.  She is in the midst of the switch from fluoxetine to celexa.  She will give this a few weeks to see if the new Celexa is effective for her anxiety.  If not improving in the next several weeks, she may call.  Cyn Avendaño MD ....................  1/15/2018   2:03 PM

## 2018-02-13 NOTE — TELEPHONE ENCOUNTER
Patient Information     Patient Name MRN Sex George Negron [id number] Female 1995      Telephone Encounter by Christi Mendoza at 2018 10:56 AM     Author:  Christi Mendoza Service:  (none) Author Type:  (none)     Filed:  2018 10:56 AM Encounter Date:  2018 Status:  Signed     :  Christi Mendoza            Left message for patient to call back .  Christi Mendoza LPN ....................2018  10:56 AM

## 2018-02-13 NOTE — TELEPHONE ENCOUNTER
Patient Information     Patient Name MRN Sex George Negron [id number] Female 1995      Telephone Encounter by Christi Mendoza at 2018  9:46 AM     Author:  Christi Mendoza Service:  (none) Author Type:  (none)     Filed:  2018  9:47 AM Encounter Date:  2018 Status:  Signed     :  Christi Mendoza            Patient has appointment set up .  Christi Mendoza LPN ....................2018  9:47 AM

## 2018-03-25 ENCOUNTER — HEALTH MAINTENANCE LETTER (OUTPATIENT)
Age: 23
End: 2018-03-25

## 2018-03-29 DIAGNOSIS — G43.809 OTHER MIGRAINE, NOT INTRACTABLE, WITHOUT STATUS MIGRAINOSUS: Primary | ICD-10-CM

## 2018-04-03 RX ORDER — SUMATRIPTAN 50 MG/1
TABLET, FILM COATED ORAL
Qty: 9 TABLET | Refills: 0 | Status: SHIPPED | OUTPATIENT
Start: 2018-04-03 | End: 2018-06-21

## 2018-04-03 NOTE — TELEPHONE ENCOUNTER
Imitrex  LOV-01/15/2018  Prescription refilled per RN Medication RefillPolkelechiy.................... Betzy Victoria .Lee Ann..................  4/3/2018   12:10 PM

## 2018-06-21 ENCOUNTER — MYC REFILL (OUTPATIENT)
Dept: FAMILY MEDICINE | Facility: OTHER | Age: 23
End: 2018-06-21

## 2018-06-21 DIAGNOSIS — G43.809 OTHER MIGRAINE, NOT INTRACTABLE, WITHOUT STATUS MIGRAINOSUS: ICD-10-CM

## 2018-06-21 RX ORDER — SUMATRIPTAN 50 MG/1
50 TABLET, FILM COATED ORAL
Qty: 9 TABLET | Refills: 0 | Status: SHIPPED | OUTPATIENT
Start: 2018-06-21 | End: 2018-09-14

## 2018-06-21 NOTE — TELEPHONE ENCOUNTER
Message from MyChart:  Original authorizing provider: MD George Stokes would like a refill of the following medications:  SUMAtriptan (IMITREX) 50 MG tablet [Cyn Avendaño MD]    Preferred pharmacy: [facility], [place] - [address]    Comment:

## 2018-07-23 NOTE — PROGRESS NOTES
Patient Information     Patient Name  George Mcknight MRN  [id number] Sex  Female   1995      Letter by Cyn Avendaño MD at      Author:  Cyn Avendaño MD Service:  (none) Author Type:  (none)    Filed:   Encounter Date:  2017 Status:  (Other)       CLINIC REPORT OF WORKABILITY    Employee: George Mcknight Date: 2017 Arrival time:    #: [id number]              : 1995  Time roomed:    Employer: [employer], [place] Date of Injury: 17 Time out:    Employer phone:  Employer fax:  :    Employer contacted: no       DIAGNOSIS: Thoracolumbar muscle strain  Work related injury/illness? Yes (Ivan Heller)  Maximum medical improvement? no  Permanent Partial Disability? no  Anticipate permanent restrictions: no    RETURN TO WORK Return to work WITH LIMITATIONS today through 17  EMPLOYEE'S CAPABILITIES   Lift/Carry  0-10 lbs:   occasional, 1-33%    11-20 lbs:  Not at all    21-35 lbs:  Not at all    36-50 lbs:  Not at all     lbs:  Not at all Push/Pull  0-25 lbs:   occasional, 1-33%    26-50 lbs:  Not at all    51-75 lbs:  Not at all     lbs:  Not at all Bend:   Not at all  Twist/turn:  Not at all  Kneel:  Not at all  Squat:  Not at all  Sit:  continuous, %  Stand/walk:  occasional, 1-33%  Overhead reaching:  occasional, 1-33%  Ladder/stair climb:  Not at all  Rotate activities/positions:  occasional, 1-33% Avoid the following hand and wrist activities:  Operate power/vibrating tools: not applicable  Coarse manipulation: not applicable  Torquing/crimping: not applicable  Grasping - light: not applicable  Grasping - heavy: not applicable  Out-stretched arms: not applicable      ------------------------------------------------------  One-handed work only: not applicable  Other: Not applicable.     TREATMENT/OTHER RESTRICTIONS: Physical Therapy: Location, frequency and duration as indicated on referral order.    RETURN TO CLINIC: Return to  clinic as needed.     _______________________________________ License Number: _______  Cyn Avendaño MD     6/27/2017   NOTICE TO EMPLOYEE: YOU MUST PROMPTLY PROVIDE A COPY OF THIS REPORT TO YOUR EMPLOYER OR WORKERS' COMPENSATION INSURER, AND QUALIFIED REHABILITATION CONSULTANT IF YOU HAVE ONE.

## 2018-07-23 NOTE — PROGRESS NOTES
Patient Information     Patient Name  George Mcknight MRN  [id number] Sex  Female   1995      Letter by Cyn Avendaño MD at      Author:  Cyn Avendaño MD Service:  (none) Author Type:  (none)    Filed:   Encounter Date:  2017 Status:  (Other)       CLINIC REPORT OF WORKABILITY    Employee: George Mcknight Date: 17 Arrival time:    #: [id number]              : 1995  Time roomed:    Employer: [employer], [place] Date of Injury: 17 Time out:    Employer phone:  Employer fax:  :    Employer contacted: no       DIAGNOSIS: Thoracolumbar muscle strain  Work related injury/illness? Yes (Ivan Heller)  Maximum medical improvement? no  Permanent Partial Disability? no  Anticipate permanent restrictions: no    RETURN TO WORK Return to work WITH LIMITATIONS today through 17  EMPLOYEE'S CAPABILITIES   Lift/Carry  0-10 lbs:   occasional, 1-33%    11-20 lbs:  Occasional, 1-33%    21-35 lbs:  Not at all    36-50 lbs:  Not at all     lbs:  Not at all Push/Pull  0-25 lbs:   occasional, 1-33%    26-50 lbs:  Not at all    51-75 lbs:  Not at all     lbs:  Not at all Bend:   Not at all  Twist/turn:  Not at all  Kneel:  Not at all  Squat:  Not at all  Sit:  continuous, %  Stand/walk:  occasional, 1-33%  Overhead reaching:  occasional, 1-33%  Ladder/stair climb:  Not at all  Rotate activities/positions:  occasional, 1-33% Avoid the following hand and wrist activities:  Operate power/vibrating tools: not applicable  Coarse manipulation: not applicable  Torquing/crimping: not applicable  Grasping - light: not applicable  Grasping - heavy: not applicable  Out-stretched arms: not applicable      ------------------------------------------------------  One-handed work only: not applicable  Other: Not applicable.     TREATMENT/OTHER RESTRICTIONS: Physical Therapy: Location, frequency and duration as indicated on referral order.    RETURN TO CLINIC: Return  to clinic as needed.     _______________________________________ License Number: [id number]  Cyn Avendaño MD     7/6/17  NOTICE TO EMPLOYEE: YOU MUST PROMPTLY PROVIDE A COPY OF THIS REPORT TO YOUR EMPLOYER OR WORKERS' COMPENSATION INSURER, AND QUALIFIED REHABILITATION CONSULTANT IF YOU HAVE ONE.

## 2018-07-24 NOTE — PROGRESS NOTES
Patient Information     Patient Name  George Mcknight MRN  [id number] Sex  Female   1995      Letter by Cyn Avendaño MD at      Author:  Cyn Avendaño MD Service:  (none) Author Type:  (none)    Filed:   Encounter Date:  2017 Status:  (Other)       CLINIC REPORT OF WORKABILITY    Employee: George Mcknight Date: 2017 Arrival time:    #: [id number]              : 1995  Time roomed:    Employer: [employer] [place] [employer] Date of Injury: 17 Time out:    Employer phone:  Employer fax:  :    Employer contacted: no       DIAGNOSIS: thoracolumbar muscle strain  Work related injury/illness? Yes (Ivan Heller)  Maximum medical improvement? no  Permanent Partial Disability? no  Anticipate permanent restrictions: no    RETURN TO WORK UNABLE to work from today through 2017.  Able to return to work without restrictions on 2017.  EMPLOYEE'S CAPABILITIES   Lift/Carry  0-10 lbs:   Not applicable    11-20 lbs:  Not applicable    21-35 lbs:  Not applicable    36-50 lbs:  Not applicable     lbs:  Not applicable Push/Pull  0-25 lbs:   Not applicable    26-50 lbs:  Not applicable    51-75 lbs:  Not applicable     lbs:  Not applicable Bend: N/A  Not applicable  Twist/turn:  Not applicable  Kneel:  Not applicable  Squat:  Not applicable  Sit:  Not applicable  Stand/walk:  Not applicable  Overhead reaching:  Not applicable  Ladder/stair climb:  Not applicable  Rotate activities/positions:  Not applicable Avoid the following hand and wrist activities:  Operate power/vibrating tools: not applicable  Coarse manipulation: not applicable  Torquing/crimping: not applicable  Grasping - light: not applicable  Grasping - heavy: not applicable  Out-stretched arms: not applicable      ------------------------------------------------------  One-handed work only: not applicable  Other: Not applicable.     TREATMENT/OTHER RESTRICTIONS: Physical Therapy:  Location, frequency and duration as indicated on referral order.  The above has been discussed with the employee.    RETURN TO CLINIC: Return to clinic as needed.     _______________________________________ License Number: [id number]    Cyn Avendaño MD     6/27/2017   NOTICE TO EMPLOYEE: YOU MUST PROMPTLY PROVIDE A COPY OF THIS REPORT TO YOUR EMPLOYER OR WORKERS' COMPENSATION INSURER, AND QUALIFIED REHABILITATION CONSULTANT IF YOU HAVE ONE.

## 2018-08-24 ENCOUNTER — MYC REFILL (OUTPATIENT)
Dept: FAMILY MEDICINE | Facility: OTHER | Age: 23
End: 2018-08-24

## 2018-08-24 ENCOUNTER — MYC MEDICAL ADVICE (OUTPATIENT)
Dept: FAMILY MEDICINE | Facility: OTHER | Age: 23
End: 2018-08-24

## 2018-08-24 DIAGNOSIS — G43.809 OTHER MIGRAINE, NOT INTRACTABLE, WITHOUT STATUS MIGRAINOSUS: ICD-10-CM

## 2018-08-24 DIAGNOSIS — F41.8 DEPRESSION WITH ANXIETY: ICD-10-CM

## 2018-08-24 DIAGNOSIS — F51.01 PRIMARY INSOMNIA: Primary | ICD-10-CM

## 2018-08-24 RX ORDER — DEXTROAMPHETAMINE SACCHARATE, AMPHETAMINE ASPARTATE MONOHYDRATE, DEXTROAMPHETAMINE SULFATE AND AMPHETAMINE SULFATE 6.25; 6.25; 6.25; 6.25 MG/1; MG/1; MG/1; MG/1
25 CAPSULE, EXTENDED RELEASE ORAL
Status: CANCELLED | OUTPATIENT
Start: 2018-08-24

## 2018-08-24 RX ORDER — SUMATRIPTAN 50 MG/1
50 TABLET, FILM COATED ORAL
Qty: 9 TABLET | Refills: 0 | Status: CANCELLED | OUTPATIENT
Start: 2018-08-24

## 2018-08-24 NOTE — TELEPHONE ENCOUNTER
Message from MyChart:  Original authorizing provider: MD George Stokes would like a refill of the following medications:  SUMAtriptan (IMITREX) 50 MG tablet [Cyn Avendaño MD]    Preferred pharmacy: [facility], - [place], [place] - [address]    Comment:

## 2018-08-27 RX ORDER — CITALOPRAM HYDROBROMIDE 20 MG/1
20 TABLET ORAL DAILY
Qty: 90 TABLET | Refills: 3 | Status: SHIPPED | OUTPATIENT
Start: 2018-08-27 | End: 2019-10-03

## 2018-08-27 RX ORDER — ZOLPIDEM TARTRATE 10 MG/1
10 TABLET ORAL
Qty: 30 TABLET | Refills: 2 | Status: SHIPPED | OUTPATIENT
Start: 2018-08-27 | End: 2018-10-17

## 2018-08-27 NOTE — TELEPHONE ENCOUNTER
I refilled celexa and ambien, but will need to see her in clinic to refill adderall.  Please assist her in making an appointment.  I do have some openings tomorrow if that works.  Cyn Avendaño MD on 8/27/2018 at 10:02 AM

## 2018-09-14 ENCOUNTER — OFFICE VISIT (OUTPATIENT)
Dept: FAMILY MEDICINE | Facility: OTHER | Age: 23
End: 2018-09-14
Attending: SURGERY
Payer: COMMERCIAL

## 2018-09-14 VITALS
DIASTOLIC BLOOD PRESSURE: 62 MMHG | RESPIRATION RATE: 18 BRPM | TEMPERATURE: 98.1 F | WEIGHT: 123 LBS | HEART RATE: 82 BPM | HEIGHT: 63 IN | SYSTOLIC BLOOD PRESSURE: 118 MMHG | BODY MASS INDEX: 21.79 KG/M2

## 2018-09-14 DIAGNOSIS — G43.809 OTHER MIGRAINE, NOT INTRACTABLE, WITHOUT STATUS MIGRAINOSUS: ICD-10-CM

## 2018-09-14 DIAGNOSIS — J45.990 EXERCISE-INDUCED ASTHMA: ICD-10-CM

## 2018-09-14 DIAGNOSIS — F98.8 ATTENTION DEFICIT DISORDER, UNSPECIFIED HYPERACTIVITY PRESENCE: Primary | ICD-10-CM

## 2018-09-14 PROCEDURE — 99214 OFFICE O/P EST MOD 30 MIN: CPT | Performed by: FAMILY MEDICINE

## 2018-09-14 PROCEDURE — 80307 DRUG TEST PRSMV CHEM ANLYZR: CPT | Performed by: FAMILY MEDICINE

## 2018-09-14 RX ORDER — DEXTROAMPHETAMINE SACCHARATE, AMPHETAMINE ASPARTATE MONOHYDRATE, DEXTROAMPHETAMINE SULFATE AND AMPHETAMINE SULFATE 6.25; 6.25; 6.25; 6.25 MG/1; MG/1; MG/1; MG/1
25 CAPSULE, EXTENDED RELEASE ORAL EVERY MORNING
Qty: 30 CAPSULE | Refills: 0 | Status: SHIPPED | OUTPATIENT
Start: 2018-10-12 | End: 2018-09-14

## 2018-09-14 RX ORDER — DEXTROAMPHETAMINE SACCHARATE, AMPHETAMINE ASPARTATE, DEXTROAMPHETAMINE SULFATE AND AMPHETAMINE SULFATE 2.5; 2.5; 2.5; 2.5 MG/1; MG/1; MG/1; MG/1
TABLET ORAL
Status: CANCELLED | OUTPATIENT
Start: 2018-09-14

## 2018-09-14 RX ORDER — DEXTROAMPHETAMINE SACCHARATE, AMPHETAMINE ASPARTATE, DEXTROAMPHETAMINE SULFATE AND AMPHETAMINE SULFATE 2.5; 2.5; 2.5; 2.5 MG/1; MG/1; MG/1; MG/1
TABLET ORAL
Qty: 30 TABLET | Refills: 0 | Status: SHIPPED | OUTPATIENT
Start: 2018-10-12 | End: 2018-09-14

## 2018-09-14 RX ORDER — DEXTROAMPHETAMINE SACCHARATE, AMPHETAMINE ASPARTATE MONOHYDRATE, DEXTROAMPHETAMINE SULFATE AND AMPHETAMINE SULFATE 6.25; 6.25; 6.25; 6.25 MG/1; MG/1; MG/1; MG/1
25 CAPSULE, EXTENDED RELEASE ORAL EVERY MORNING
Qty: 30 CAPSULE | Refills: 0 | Status: SHIPPED | OUTPATIENT
Start: 2018-11-10 | End: 2018-12-20

## 2018-09-14 RX ORDER — DEXTROAMPHETAMINE SACCHARATE, AMPHETAMINE ASPARTATE, DEXTROAMPHETAMINE SULFATE AND AMPHETAMINE SULFATE 2.5; 2.5; 2.5; 2.5 MG/1; MG/1; MG/1; MG/1
TABLET ORAL
Qty: 30 TABLET | Refills: 0 | Status: SHIPPED | OUTPATIENT
Start: 2018-11-10 | End: 2018-12-20

## 2018-09-14 RX ORDER — ALBUTEROL SULFATE 90 UG/1
2 AEROSOL, METERED RESPIRATORY (INHALATION) EVERY 4 HOURS PRN
Qty: 1 INHALER | Refills: 11 | Status: SHIPPED | OUTPATIENT
Start: 2018-09-14 | End: 2023-08-10

## 2018-09-14 RX ORDER — DEXTROAMPHETAMINE SACCHARATE, AMPHETAMINE ASPARTATE, DEXTROAMPHETAMINE SULFATE AND AMPHETAMINE SULFATE 2.5; 2.5; 2.5; 2.5 MG/1; MG/1; MG/1; MG/1
TABLET ORAL
Qty: 30 TABLET | Refills: 0 | Status: SHIPPED | OUTPATIENT
Start: 2018-09-14 | End: 2018-09-14

## 2018-09-14 RX ORDER — SUMATRIPTAN 50 MG/1
50 TABLET, FILM COATED ORAL
Qty: 9 TABLET | Refills: 0 | Status: SHIPPED | OUTPATIENT
Start: 2018-09-14 | End: 2018-10-17

## 2018-09-14 RX ORDER — DEXTROAMPHETAMINE SACCHARATE, AMPHETAMINE ASPARTATE MONOHYDRATE, DEXTROAMPHETAMINE SULFATE AND AMPHETAMINE SULFATE 6.25; 6.25; 6.25; 6.25 MG/1; MG/1; MG/1; MG/1
25 CAPSULE, EXTENDED RELEASE ORAL EVERY MORNING
Qty: 30 CAPSULE | Refills: 0 | Status: SHIPPED | OUTPATIENT
Start: 2018-09-14 | End: 2018-09-14

## 2018-09-14 ASSESSMENT — ANXIETY QUESTIONNAIRES
2. NOT BEING ABLE TO STOP OR CONTROL WORRYING: NOT AT ALL
GAD7 TOTAL SCORE: 6
IF YOU CHECKED OFF ANY PROBLEMS ON THIS QUESTIONNAIRE, HOW DIFFICULT HAVE THESE PROBLEMS MADE IT FOR YOU TO DO YOUR WORK, TAKE CARE OF THINGS AT HOME, OR GET ALONG WITH OTHER PEOPLE: SOMEWHAT DIFFICULT
5. BEING SO RESTLESS THAT IT IS HARD TO SIT STILL: MORE THAN HALF THE DAYS
6. BECOMING EASILY ANNOYED OR IRRITABLE: NOT AT ALL
3. WORRYING TOO MUCH ABOUT DIFFERENT THINGS: SEVERAL DAYS
1. FEELING NERVOUS, ANXIOUS, OR ON EDGE: SEVERAL DAYS
7. FEELING AFRAID AS IF SOMETHING AWFUL MIGHT HAPPEN: NOT AT ALL

## 2018-09-14 ASSESSMENT — PATIENT HEALTH QUESTIONNAIRE - PHQ9: 5. POOR APPETITE OR OVEREATING: MORE THAN HALF THE DAYS

## 2018-09-14 ASSESSMENT — PAIN SCALES - GENERAL: PAINLEVEL: NO PAIN (0)

## 2018-09-14 NOTE — MR AVS SNAPSHOT
"              After Visit Summary   9/14/2018    George Mcknight    MRN: [id number]           Patient Information     Date Of Birth          1995        Visit Information        Provider Department      9/14/2018 1:30 PM Cyn Avendaño MD [facility]        Today's Diagnoses     Attention deficit disorder, unspecified hyperactivity presence    -  1    Other migraine, not intractable, without status migrainosus        Exercise-induced asthma           Follow-ups after your visit        Who to contact     If you have questions or need follow up information about today's clinic visit or your schedule please contact [facility] directly at [phone].  Normal or non-critical lab and imaging results will be communicated to you by MyChart, letter or phone within 4 business days after the clinic has received the results. If you do not hear from us within 7 days, please contact the clinic through AroundWire[employer] or phone. If you have a critical or abnormal lab result, we will notify you by phone as soon as possible.  Submit refill requests through [email or web address] or call your pharmacy and they will forward the refill request to us. Please allow 3 business days for your refill to be completed.          Additional Information About Your Visit        MyChart Information     [email or web address] gives you secure access to your electronic health record. If you see a primary care provider, you can also send messages to your care team and make appointments. If you have questions, please call your primary care clinic.  If you do not have a primary care provider, please call [phone] and they will assist you.        Care EveryWhere ID     This is your Care EveryWhere ID. This could be used by other organizations to access your [facility] medical records  [id number]        Your Vitals Were     Pulse Temperature Respirations Height Last Period Breastfeeding?    82 98.1  F (36.7  C) (Tympanic) 18 5' 3\" " (1.6 m) 08/28/2018 No    BMI (Body Mass Index)                   21.79 kg/m2            Blood Pressure from Last 3 Encounters:   09/14/18 118/62   01/15/18 100/63   06/27/17 96/78    Weight from Last 3 Encounters:   09/14/18 123 lb (55.8 kg)   01/15/18 124 lb (56.2 kg)   06/27/17 119 lb 12.8 oz (54.3 kg) (34 %)*     * Growth percentiles are based on [facility] 2-20 Years data.              We Performed the Following     Drug  Screen Comprehensive , Urine with Reported Meds (MedTox) (Pain Care Package)          Today's Medication Changes          These changes are accurate as of 9/14/18  1:56 PM.  If you have any questions, ask your nurse or doctor.               Start taking these medicines.        Dose/Directions    * amphetamine-dextroamphetamine 25 MG 24 hr capsule   Commonly known as:  ADDERALL XR   Used for:  Attention deficit disorder, unspecified hyperactivity presence   Started by:  Cyn Avendaño MD        Dose:  25 mg   Start taking on:  11/10/2018   Take 1 capsule (25 mg) by mouth every morning   Quantity:  30 capsule   Refills:  0       * amphetamine-dextroamphetamine 10 MG per tablet   Commonly known as:  ADDERALL   Used for:  Attention deficit disorder, unspecified hyperactivity presence   Started by:  Cyn Avendaño MD        Start taking on:  11/10/2018   Take 1 tablet by mouth in the evening as needed   Quantity:  30 tablet   Refills:  0       * Notice:  This list has 2 medication(s) that are the same as other medications prescribed for you. Read the directions carefully, and ask your doctor or other care provider to review them with you.      These medicines have changed or have updated prescriptions.        Dose/Directions    albuterol 108 (90 Base) MCG/ACT inhaler   Commonly known as:  PROAIR HFA/PROVENTIL HFA/VENTOLIN HFA   This may have changed:    - when to take this  - reasons to take this   Used for:  Exercise-induced asthma   Changed by:  Cyn Avendaño MD         Dose:  2 puff   Inhale 2 puffs into the lungs every 4 hours as needed for shortness of breath / dyspnea or wheezing   Quantity:  1 Inhaler   Refills:  11            Where to get your medicines      These medications were sent to [facility], - [place], [place] - [address]  [address], [place] [address]     Phone:  [phone]     albuterol 108 (90 Base) MCG/ACT inhaler    SUMAtriptan 50 MG tablet         Some of these will need a paper prescription and others can be bought over the counter.  Ask your nurse if you have questions.     Bring a paper prescription for each of these medications     amphetamine-dextroamphetamine 10 MG per tablet    amphetamine-dextroamphetamine 25 MG 24 hr capsule                Primary Care Provider Office Phone # Fax #    Cyn Clover Avendaño [phone]       [address] [address]        Equal Access to Services     [facility] : Hadii shailesh adames hadasho Soludmilaali, waaxda luqadaha, qaybta kaalmada adeegyada, waxay carolynn buckley . [facility] [phone].    ATENCIÓN: Si habla español, tiene a malcolm disposición servicios gratuitos de asistencia lingüística. [facility] [phone].    We comply with applicable federal civil rights laws and [place] laws. We do not discriminate on the basis of race, color, national origin, age, disability, sex, sexual orientation, or gender identity.            Thank you!     Thank you for choosing [facility]  for your care. Our goal is always to provide you with excellent care. Hearing back from our patients is one way we can continue to improve our services. Please take a few minutes to complete the written survey that you may receive in the mail after your visit with us. Thank you!             Your Updated Medication List - Protect others around you: Learn how to safely use, store and throw away your medicines at  www.disposemymeds.org.          This list is accurate as of 9/14/18  1:56 PM.  Always use your most recent med list.                   Brand Name Dispense Instructions for use Diagnosis    albuterol 108 (90 Base) MCG/ACT inhaler    PROAIR HFA/PROVENTIL HFA/VENTOLIN HFA    1 Inhaler    Inhale 2 puffs into the lungs every 4 hours as needed for shortness of breath / dyspnea or wheezing    Exercise-induced asthma       * amphetamine-dextroamphetamine 25 MG 24 hr capsule   Start taking on:  11/10/2018    ADDERALL XR    30 capsule    Take 1 capsule (25 mg) by mouth every morning    Attention deficit disorder, unspecified hyperactivity presence       * amphetamine-dextroamphetamine 10 MG per tablet   Start taking on:  11/10/2018    ADDERALL    30 tablet    Take 1 tablet by mouth in the evening as needed    Attention deficit disorder, unspecified hyperactivity presence       citalopram 20 MG tablet    celeXA    90 tablet    Take 1 tablet (20 mg) by mouth daily    Depression with anxiety       pseudoePHEDrine 60 MG tablet    SUDAFED     Take 60 mg by mouth        SUMAtriptan 50 MG tablet    IMITREX    9 tablet    Take 1 tablet (50 mg) by mouth at onset of headache for migraine May repeat after 2 hours as needed.  Max dose 200 mg per 24 hours.    Other migraine, not intractable, without status migrainosus       vitamin D3 1000 units Caps      Take 1,000 Units by mouth        zolpidem 10 MG tablet    AMBIEN    30 tablet    Take 1 tablet (10 mg) by mouth nightly as needed for sleep    Primary insomnia       * Notice:  This list has 2 medication(s) that are the same as other medications prescribed for you. Read the directions carefully, and ask your doctor or other care provider to review them with you.

## 2018-09-14 NOTE — PROGRESS NOTES
SUBJECTIVE:   There are no exam notes on file for this visit.    George Mcknight is a 23 year old female who presents to clinic today for refill of the Adderall XR in the immediate release that she takes for ADHD.  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  She is only working casually during the school year.  She has been doing well so far this school year.  She also needs refills of the sumatriptan she uses for her migraines.  And the albuterol she uses infrequently for exercise-induced asthma.  She states that on her 23st birthday, somebody gave her brownie at 0 because when she was there for her birthday dinner.  After she had already tasted it, they told her it had marijuana in it.  She states that she does not plan on using any of this further and this was an isolated events.  We discussed that she is due for her urine drug screen today.    HPI    I personally reviewed medications/allergies/history listed below:    Patient Active Problem List    Diagnosis Date Noted     Exercise-induced asthma 09/14/2018     Priority: Medium     ADD (attention deficit disorder) 03/25/2017     Priority: Medium     Depression with anxiety 03/25/2017     Priority: Medium     Insomnia 03/25/2017     Priority: Medium     Migraine without status migrainosus, not intractable 03/25/2017     Priority: Medium     Controlled substance agreement signed 03/24/2017     Priority: Medium     Overview:   Adderall XR 25 mg and IR 10 mg, #30 of each per month       Past Medical History:   Diagnosis Date     Convergence insufficiency     2012,as a result of TBI - had to wear prism glasses for about 1 year.     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     Migraine without status migrainosus, not intractable     No Comments Provided     Other specified anxiety disorders     No Comments Provided     Other specified behavioral and emotional disorders with onset usually  occurring in childhood and adolescence     No Comments Provided      History reviewed. No pertinent surgical history.  History reviewed. No pertinent family history.  Social History   Substance Use Topics     Smoking status: Never Smoker     Smokeless tobacco: Never Used     Alcohol use Yes      Comment: Alcoholic Drinks/day: a few times a year     Social History     Social History Narrative    Student at [employer].  Studying toward her BSN.  Family moved from [place], [place] fall 2016.  Both parents grew up in the [place].  Mom - Noel is RN, Clinic Nurse Manager at [employer].  Dad - Armando  Sister - Marianna is  and works as an RN at [employer] in [place].     Current Outpatient Prescriptions   Medication Sig Dispense Refill     albuterol (PROAIR HFA/PROVENTIL HFA/VENTOLIN HFA) 108 (90 Base) MCG/ACT inhaler Inhale 2 puffs into the lungs every 4 hours as needed for shortness of breath / dyspnea or wheezing 1 Inhaler 11     [START ON 11/10/2018] amphetamine-dextroamphetamine (ADDERALL XR) 25 MG 24 hr capsule Take 1 capsule (25 mg) by mouth every morning 30 capsule 0     [START ON 11/10/2018] amphetamine-dextroamphetamine (ADDERALL) 10 MG per tablet Take 1 tablet by mouth in the evening as needed 30 tablet 0     Cholecalciferol (VITAMIN D3) 1000 UNITS CAPS Take 1,000 Units by mouth       citalopram (CELEXA) 20 MG tablet Take 1 tablet (20 mg) by mouth daily 90 tablet 3     pseudoePHEDrine (SUDAFED) 60 MG tablet Take 60 mg by mouth       SUMAtriptan (IMITREX) 50 MG tablet Take 1 tablet (50 mg) by mouth at onset of headache for migraine May repeat after 2 hours as needed.  Max dose 200 mg per 24 hours. 9 tablet 0     zolpidem (AMBIEN) 10 MG tablet Take 1 tablet (10 mg) by mouth nightly as needed for sleep 30 tablet 2     Allergies   Allergen Reactions     No Clinical Screening - See Comments Other (See Comments)     Trees - itchy  Swelling of face/eyes/throat       Review of Systems   Constitutional: Negative for  "fever.   Respiratory: Negative for cough.    Neurological: Negative for tremors.   Psychiatric/Behavioral: Negative for mood changes. The patient is not nervous/anxious.         OBJECTIVE:     /62 (BP Location: Right arm, Patient Position: Sitting, Cuff Size: Adult Regular)  Pulse 82  Temp 98.1  F (36.7  C) (Tympanic)  Resp 18  Ht 5' 3\" (1.6 m)  Wt 123 lb (55.8 kg)  LMP 08/28/2018  Breastfeeding? No  BMI 21.79 kg/m2  Body mass index is 21.79 kg/(m^2).  Physical Exam   Constitutional: She appears well-developed.   HENT:   Head: Normocephalic.   Eyes: Pupils are equal, round, and reactive to light.   Neck: Normal range of motion. Neck supple. No thyromegaly present.   Cardiovascular: Normal rate, regular rhythm and normal heart sounds.    No murmur heard.  Pulmonary/Chest: Effort normal and breath sounds normal. No respiratory distress. She has no wheezes. She has no rales.   Musculoskeletal: She exhibits no edema.   Lymphadenopathy:     She has no cervical adenopathy.   Psychiatric: She has a normal mood and affect.         PHQ-9 SCORE 3/24/2017 1/15/2018 9/14/2018   Total Score 6 5 10       PHQ-2 Score:     No flowsheet data found.    HECTOR-7 SCORE 3/24/2017 1/15/2018 9/14/2018   Total Score 2 8 6         ACT Total Scores 9/14/2018   ACT TOTAL SCORE (Goal Greater than or Equal to 20) 25   In the past 12 months, how many times did you visit the emergency room for your asthma without being admitted to the hospital? 0   In the past 12 months, how many times were you hospitalized overnight because of your asthma? 0         I personally reviewed results withpatient as listed below:   Diagnostic Test Results:  none     ASSESSMENT/PLAN:       ICD-10-CM    1. Attention deficit disorder, unspecified hyperactivity presence F98.8 Drug  Screen Comprehensive , Urine with Reported Meds (MedTox) (Pain Care Package)     amphetamine-dextroamphetamine (ADDERALL XR) 25 MG 24 hr capsule     amphetamine-dextroamphetamine " (ADDERALL) 10 MG per tablet     DISCONTINUED: amphetamine-dextroamphetamine (ADDERALL XR) 25 MG 24 hr capsule     DISCONTINUED: amphetamine-dextroamphetamine (ADDERALL) 10 MG per tablet     DISCONTINUED: amphetamine-dextroamphetamine (ADDERALL XR) 25 MG 24 hr capsule     DISCONTINUED: amphetamine-dextroamphetamine (ADDERALL) 10 MG per tablet   2. Other migraine, not intractable, without status migrainosus G43.809 SUMAtriptan (IMITREX) 50 MG tablet   3. Exercise-induced asthma J45.990 albuterol (PROAIR HFA/PROVENTIL HFA/VENTOLIN HFA) 108 (90 Base) MCG/ACT inhaler       1.  Adderall XR 25 mg #30×3 and Adderall IR 10 mg #30×3 refill today.  [facility] website printout reviewed and scanned today.  Contract and drug screen both updated today.  Follow-up in 3 months.  2.  Imitrex refilled today as above.  3.  Albuterol refilled today as above.    Cyn Avendaño MD  [facility]

## 2018-09-15 ASSESSMENT — ANXIETY QUESTIONNAIRES: GAD7 TOTAL SCORE: 6

## 2018-09-15 ASSESSMENT — ASTHMA QUESTIONNAIRES: ACT_TOTALSCORE: 25

## 2018-09-15 ASSESSMENT — PATIENT HEALTH QUESTIONNAIRE - PHQ9: SUM OF ALL RESPONSES TO PHQ QUESTIONS 1-9: 10

## 2018-09-16 ASSESSMENT — ENCOUNTER SYMPTOMS
FEVER: 0
NERVOUS/ANXIOUS: 0
TREMORS: 0
COUGH: 0

## 2018-09-21 LAB — PAIN DRUG SCR UR W RPTD MEDS: NORMAL

## 2018-10-17 ENCOUNTER — MYC MEDICAL ADVICE (OUTPATIENT)
Dept: FAMILY MEDICINE | Facility: OTHER | Age: 23
End: 2018-10-17

## 2018-10-17 ENCOUNTER — MYC REFILL (OUTPATIENT)
Dept: FAMILY MEDICINE | Facility: OTHER | Age: 23
End: 2018-10-17

## 2018-10-17 DIAGNOSIS — G43.809 OTHER MIGRAINE, NOT INTRACTABLE, WITHOUT STATUS MIGRAINOSUS: ICD-10-CM

## 2018-10-17 DIAGNOSIS — F51.01 PRIMARY INSOMNIA: ICD-10-CM

## 2018-10-18 RX ORDER — ZOLPIDEM TARTRATE 10 MG/1
10 TABLET ORAL
Qty: 30 TABLET | Refills: 2 | Status: SHIPPED | OUTPATIENT
Start: 2018-10-18 | End: 2019-10-03

## 2018-10-18 RX ORDER — SUMATRIPTAN 50 MG/1
50 TABLET, FILM COATED ORAL
Qty: 9 TABLET | Refills: 11 | Status: SHIPPED | OUTPATIENT
Start: 2018-10-18 | End: 2020-04-09

## 2018-10-18 NOTE — TELEPHONE ENCOUNTER
I refilled her ambien and sumatriptan.  Please let pharmacy know to fill her 2 adderall prescriptions that are on file and due to be refilled on 10/12/18 if they have not already.  Cyn Avendaño MD on 10/18/2018 at 9:51 AM

## 2018-10-18 NOTE — TELEPHONE ENCOUNTER
Message from [employer]:  Original authorizing provider: MD George Stokes MARITZALee Ann Mcknight would like a refill of the following medications:  zolpidem (AMBIEN) 10 MG tablet [Cyn Avendaño MD]  SUMAtriptan (IMITREX) 50 MG tablet [Cyn Avendaño MD]    Preferred pharmacy: [facility], - [place], [place] - [address]    Comment:  Also, I have Adderall XR 25mg and Adderall IR 10mg scripts on file at the pharmacy that I need filled as well.

## 2018-10-18 NOTE — TELEPHONE ENCOUNTER
Called pharmacy and they will fill adderall . Christi Mendoza LPN ....................10/18/2018  10:02 AM

## 2018-10-19 ENCOUNTER — MYC MEDICAL ADVICE (OUTPATIENT)
Dept: FAMILY MEDICINE | Facility: OTHER | Age: 23
End: 2018-10-19

## 2018-10-19 DIAGNOSIS — G43.809 OTHER MIGRAINE WITHOUT STATUS MIGRAINOSUS, NOT INTRACTABLE: Primary | ICD-10-CM

## 2018-10-19 RX ORDER — PROPRANOLOL HCL 60 MG
60 CAPSULE, EXTENDED RELEASE 24HR ORAL DAILY
Qty: 30 CAPSULE | Refills: 11 | Status: SHIPPED | OUTPATIENT
Start: 2018-10-19 | End: 2020-04-02

## 2018-11-02 ENCOUNTER — HOSPITAL ENCOUNTER (OUTPATIENT)
Dept: MRI IMAGING | Facility: OTHER | Age: 23
Discharge: HOME | End: 2018-11-02
Attending: FAMILY MEDICINE | Admitting: FAMILY MEDICINE
Payer: COMMERCIAL

## 2018-11-02 DIAGNOSIS — G43.809 OTHER MIGRAINE WITHOUT STATUS MIGRAINOSUS, NOT INTRACTABLE: ICD-10-CM

## 2018-11-02 PROCEDURE — 25500064 ZZH RX 255 OP 636: Performed by: FAMILY MEDICINE

## 2018-11-02 PROCEDURE — A9575 INJ GADOTERATE MEGLUMI 0.1ML: HCPCS | Performed by: FAMILY MEDICINE

## 2018-11-02 PROCEDURE — 70553 MRI BRAIN STEM W/O & W/DYE: CPT

## 2018-11-02 RX ADMIN — GADOTERATE MEGLUMINE 11 ML: 376.9 INJECTION INTRAVENOUS at 14:30

## 2018-11-13 ENCOUNTER — HEALTH MAINTENANCE LETTER (OUTPATIENT)
Age: 23
End: 2018-11-13

## 2018-11-29 ENCOUNTER — TELEPHONE (OUTPATIENT)
Dept: FAMILY MEDICINE | Facility: OTHER | Age: 23
End: 2018-11-29

## 2018-11-29 NOTE — TELEPHONE ENCOUNTER
Patient is requesting an appointment with [facility] for Adderall refill. Patient will be coming back from school in [place] on 12/19/2018 and for the rest of December. As of now, patient is scheduled for 12/21/2018 but for a 15 min time slot. Is this ok? Otherwise, if we are needing to see her for a longer appointment time is there another day that [facility] would be able to see her or she would be comfortable seeing someone else but wanted to check with physician first.     Please advise

## 2018-11-29 NOTE — TELEPHONE ENCOUNTER
Will see patient on the 20 th at 1:30 . Christi Mendoza LPN ....................11/29/2018  4:15 PM

## 2018-12-20 ENCOUNTER — OFFICE VISIT (OUTPATIENT)
Dept: FAMILY MEDICINE | Facility: OTHER | Age: 23
End: 2018-12-20
Attending: FAMILY MEDICINE
Payer: COMMERCIAL

## 2018-12-20 VITALS — BODY MASS INDEX: 22.32 KG/M2 | WEIGHT: 126 LBS | HEIGHT: 63 IN | RESPIRATION RATE: 18 BRPM | TEMPERATURE: 97.7 F

## 2018-12-20 DIAGNOSIS — F98.8 ATTENTION DEFICIT DISORDER, UNSPECIFIED HYPERACTIVITY PRESENCE: Primary | ICD-10-CM

## 2018-12-20 DIAGNOSIS — I73.00 RAYNAUD'S DISEASE WITHOUT GANGRENE: ICD-10-CM

## 2018-12-20 LAB
ERYTHROCYTE [SEDIMENTATION RATE] IN BLOOD BY WESTERGREN METHOD: 4 MM/H (ref 1–15)
RHEUMATOID FACT SER NEPH-ACNC: <14 IU/ML (ref 0–20)

## 2018-12-20 PROCEDURE — 85652 RBC SED RATE AUTOMATED: CPT | Performed by: FAMILY MEDICINE

## 2018-12-20 PROCEDURE — 86039 ANTINUCLEAR ANTIBODIES (ANA): CPT | Performed by: FAMILY MEDICINE

## 2018-12-20 PROCEDURE — 86038 ANTINUCLEAR ANTIBODIES: CPT | Performed by: FAMILY MEDICINE

## 2018-12-20 PROCEDURE — 99213 OFFICE O/P EST LOW 20 MIN: CPT | Performed by: FAMILY MEDICINE

## 2018-12-20 PROCEDURE — 86200 CCP ANTIBODY: CPT | Performed by: FAMILY MEDICINE

## 2018-12-20 PROCEDURE — 86431 RHEUMATOID FACTOR QUANT: CPT | Performed by: FAMILY MEDICINE

## 2018-12-20 PROCEDURE — 36415 COLL VENOUS BLD VENIPUNCTURE: CPT | Performed by: FAMILY MEDICINE

## 2018-12-20 RX ORDER — DEXTROAMPHETAMINE SACCHARATE, AMPHETAMINE ASPARTATE MONOHYDRATE, DEXTROAMPHETAMINE SULFATE AND AMPHETAMINE SULFATE 6.25; 6.25; 6.25; 6.25 MG/1; MG/1; MG/1; MG/1
25 CAPSULE, EXTENDED RELEASE ORAL EVERY MORNING
Qty: 30 CAPSULE | Refills: 0 | Status: SHIPPED | OUTPATIENT
Start: 2019-02-16 | End: 2019-03-22

## 2018-12-20 RX ORDER — DEXTROAMPHETAMINE SACCHARATE, AMPHETAMINE ASPARTATE MONOHYDRATE, DEXTROAMPHETAMINE SULFATE AND AMPHETAMINE SULFATE 6.25; 6.25; 6.25; 6.25 MG/1; MG/1; MG/1; MG/1
25 CAPSULE, EXTENDED RELEASE ORAL 2 TIMES DAILY
COMMUNITY
Start: 2018-01-15 | End: 2018-12-20

## 2018-12-20 RX ORDER — DEXTROAMPHETAMINE SACCHARATE, AMPHETAMINE ASPARTATE MONOHYDRATE, DEXTROAMPHETAMINE SULFATE AND AMPHETAMINE SULFATE 6.25; 6.25; 6.25; 6.25 MG/1; MG/1; MG/1; MG/1
25 CAPSULE, EXTENDED RELEASE ORAL EVERY MORNING
Qty: 30 CAPSULE | Refills: 0 | Status: SHIPPED | OUTPATIENT
Start: 2018-12-20 | End: 2019-03-22

## 2018-12-20 RX ORDER — DEXTROAMPHETAMINE SACCHARATE, AMPHETAMINE ASPARTATE, DEXTROAMPHETAMINE SULFATE AND AMPHETAMINE SULFATE 2.5; 2.5; 2.5; 2.5 MG/1; MG/1; MG/1; MG/1
TABLET ORAL
Qty: 30 TABLET | Refills: 0 | Status: SHIPPED | OUTPATIENT
Start: 2019-01-18 | End: 2019-03-22

## 2018-12-20 RX ORDER — DEXTROAMPHETAMINE SACCHARATE, AMPHETAMINE ASPARTATE, DEXTROAMPHETAMINE SULFATE AND AMPHETAMINE SULFATE 2.5; 2.5; 2.5; 2.5 MG/1; MG/1; MG/1; MG/1
TABLET ORAL
Qty: 30 TABLET | Refills: 0 | Status: SHIPPED | OUTPATIENT
Start: 2019-02-16 | End: 2019-03-22

## 2018-12-20 RX ORDER — DEXTROAMPHETAMINE SACCHARATE, AMPHETAMINE ASPARTATE, DEXTROAMPHETAMINE SULFATE AND AMPHETAMINE SULFATE 2.5; 2.5; 2.5; 2.5 MG/1; MG/1; MG/1; MG/1
TABLET ORAL
Qty: 30 TABLET | Refills: 0 | Status: SHIPPED | OUTPATIENT
Start: 2018-12-20 | End: 2019-03-22

## 2018-12-20 RX ORDER — DEXTROAMPHETAMINE SACCHARATE, AMPHETAMINE ASPARTATE, DEXTROAMPHETAMINE SULFATE AND AMPHETAMINE SULFATE 2.5; 2.5; 2.5; 2.5 MG/1; MG/1; MG/1; MG/1
10 TABLET ORAL 2 TIMES DAILY
COMMUNITY
End: 2018-12-20

## 2018-12-20 RX ORDER — DEXTROAMPHETAMINE SACCHARATE, AMPHETAMINE ASPARTATE MONOHYDRATE, DEXTROAMPHETAMINE SULFATE AND AMPHETAMINE SULFATE 6.25; 6.25; 6.25; 6.25 MG/1; MG/1; MG/1; MG/1
25 CAPSULE, EXTENDED RELEASE ORAL EVERY MORNING
Qty: 30 CAPSULE | Refills: 0 | Status: SHIPPED | OUTPATIENT
Start: 2019-01-18 | End: 2019-03-22

## 2018-12-20 ASSESSMENT — MIFFLIN-ST. JEOR: SCORE: 1305.66

## 2018-12-20 ASSESSMENT — ANXIETY QUESTIONNAIRES
5. BEING SO RESTLESS THAT IT IS HARD TO SIT STILL: MORE THAN HALF THE DAYS
3. WORRYING TOO MUCH ABOUT DIFFERENT THINGS: NOT AT ALL
2. NOT BEING ABLE TO STOP OR CONTROL WORRYING: NOT AT ALL
6. BECOMING EASILY ANNOYED OR IRRITABLE: NOT AT ALL
1. FEELING NERVOUS, ANXIOUS, OR ON EDGE: MORE THAN HALF THE DAYS
IF YOU CHECKED OFF ANY PROBLEMS ON THIS QUESTIONNAIRE, HOW DIFFICULT HAVE THESE PROBLEMS MADE IT FOR YOU TO DO YOUR WORK, TAKE CARE OF THINGS AT HOME, OR GET ALONG WITH OTHER PEOPLE: SOMEWHAT DIFFICULT
7. FEELING AFRAID AS IF SOMETHING AWFUL MIGHT HAPPEN: NOT AT ALL
GAD7 TOTAL SCORE: 6

## 2018-12-20 ASSESSMENT — PATIENT HEALTH QUESTIONNAIRE - PHQ9
SUM OF ALL RESPONSES TO PHQ QUESTIONS 1-9: 4
5. POOR APPETITE OR OVEREATING: MORE THAN HALF THE DAYS

## 2018-12-20 NOTE — NURSING NOTE
"Chief Complaint   Patient presents with     Recheck Medication       Initial Temp 97.7  F (36.5  C) (Tympanic)   Resp 18   Ht 1.6 m (5' 3\")   Wt 57.2 kg (126 lb)   BMI 22.32 kg/m   Estimated body mass index is 22.32 kg/m  as calculated from the following:    Height as of this encounter: 1.6 m (5' 3\").    Weight as of this encounter: 57.2 kg (126 lb).  Medication Reconciliation: complete    Christi Mendoza LPN  "

## 2018-12-20 NOTE — PROGRESS NOTES
"  SUBJECTIVE:   Nursing Notes:   Christi Mendoza LPN  12/20/2018  1:45 PM  Sign at exiting of workspace  Chief Complaint   Patient presents with     Recheck Medication       Initial Temp 97.7  F (36.5  C) (Tympanic)   Resp 18   Ht 1.6 m (5' 3\")   Wt 57.2 kg (126 lb)   BMI 22.32 kg/m    Estimated body mass index is 22.32 kg/m  as calculated from the following:    Height as of this encounter: 1.6 m (5' 3\").    Weight as of this encounter: 57.2 kg (126 lb).  Medication Reconciliation: complete    Christi Mendoza LPN    George Mcknight is a 23 year old female who presents to clinic today for refill of the Adderall XR in the immediate release that she takes for ADHD.  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  She is only working casually during the school year.  She has been doing well so far this school year.     Has been noticing that her fingers and toes will look white and hurt at times.  If she takes a shower, they purple & red in the showed and will hurt.  Seems to be getting worse with time.  Uncertain whether stress was causing it to worsen.  Never has had any ulcerations in fingers or toes.  She has some pictures on her phone of what her feet look like at these times.    HPI    I personally reviewed medications/allergies/history listed below:    Patient Active Problem List    Diagnosis Date Noted     Exercise-induced asthma 09/14/2018     Priority: Medium     ADD (attention deficit disorder) 03/25/2017     Priority: Medium     Depression with anxiety 03/25/2017     Priority: Medium     Insomnia 03/25/2017     Priority: Medium     Migraine without status migrainosus, not intractable 03/25/2017     Priority: Medium     Controlled substance agreement signed 03/24/2017     Priority: Medium     Overview:   Adderall XR 25 mg and IR 10 mg, #30 of each per month       Past Medical History:   Diagnosis Date     Convergence insufficiency     2012,as a result of TBI - had to wear prism glasses " for about 1 year.     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     Migraine without status migrainosus, not intractable     No Comments Provided     Other specified anxiety disorders     No Comments Provided     Other specified behavioral and emotional disorders with onset usually occurring in childhood and adolescence     No Comments Provided      History reviewed. No pertinent surgical history.  History reviewed. No pertinent family history.  Social History     Tobacco Use     Smoking status: Never Smoker     Smokeless tobacco: Never Used   Substance Use Topics     Alcohol use: Yes     Comment: Alcoholic Drinks/day: a few times a year     Social History     Social History Narrative    Student at [employer].  Studying toward her BSN.  Family moved from [place], [place] fall 2016.  Both parents grew up in the [facility].  Mom - Noel is RN, Clinic Nurse Manager at [employer].  Dad - Armando  Sister - Marianna is  and works as an RN at [employer] in [place].     Current Outpatient Medications   Medication Sig Dispense Refill     [START ON 2/16/2019] amphetamine-dextroamphetamine (ADDERALL XR) 25 MG 24 hr capsule Take 1 capsule (25 mg) by mouth every morning 30 capsule 0     [START ON 1/18/2019] amphetamine-dextroamphetamine (ADDERALL XR) 25 MG 24 hr capsule Take 1 capsule (25 mg) by mouth every morning 30 capsule 0     amphetamine-dextroamphetamine (ADDERALL XR) 25 MG 24 hr capsule Take 1 capsule (25 mg) by mouth every morning 30 capsule 0     [START ON 2/16/2019] amphetamine-dextroamphetamine (ADDERALL) 10 MG tablet Take 1 tablet by mouth in the evening as needed 30 tablet 0     [START ON 1/18/2019] amphetamine-dextroamphetamine (ADDERALL) 10 MG tablet Take 1 tablet by mouth in the evening as needed 30 tablet 0     amphetamine-dextroamphetamine (ADDERALL) 10 MG tablet Take 1 tablet by mouth in the evening as needed 30 tablet 0     albuterol (PROAIR HFA/PROVENTIL  "HFA/VENTOLIN HFA) 108 (90 Base) MCG/ACT inhaler Inhale 2 puffs into the lungs every 4 hours as needed for shortness of breath / dyspnea or wheezing 1 Inhaler 11     Cholecalciferol (VITAMIN D3) 1000 UNITS CAPS Take 1,000 Units by mouth       citalopram (CELEXA) 20 MG tablet Take 1 tablet (20 mg) by mouth daily 90 tablet 3     propranolol (INDERAL LA) 60 MG 24 hr capsule Take 1 capsule (60 mg) by mouth daily 30 capsule 11     pseudoePHEDrine (SUDAFED) 60 MG tablet Take 60 mg by mouth       SUMAtriptan (IMITREX) 50 MG tablet Take 1 tablet (50 mg) by mouth at onset of headache for migraine May repeat after 2 hours as needed.  Max dose 200 mg per 24 hours. 9 tablet 11     zolpidem (AMBIEN) 10 MG tablet Take 1 tablet (10 mg) by mouth nightly as needed for sleep 30 tablet 2     Allergies   Allergen Reactions     No Clinical Screening - See Comments Other (See Comments)     Trees - itchy  Swelling of face/eyes/throat       Review of Systems   Constitutional: Negative for fever.   Respiratory: Negative for cough.    Neurological: Negative for tremors.   Psychiatric/Behavioral: Negative for mood changes. The patient is not nervous/anxious.         OBJECTIVE:     Temp 97.7  F (36.5  C) (Tympanic)   Resp 18   Ht 1.6 m (5' 3\")   Wt 57.2 kg (126 lb)   BMI 22.32 kg/m     Body mass index is 22.32 kg/m .  Physical Exam   Constitutional: She appears well-developed.   HENT:   Head: Normocephalic.   Eyes: Pupils are equal, round, and reactive to light.   Neck: Normal range of motion. Neck supple. No thyromegaly present.   Cardiovascular: Normal rate, regular rhythm and normal heart sounds.   No murmur heard.  Pulmonary/Chest: Effort normal and breath sounds normal. No respiratory distress. She has no wheezes. She has no rales.   Musculoskeletal: She exhibits no edema.   Lymphadenopathy:     She has no cervical adenopathy.   Skin: Skin is warm and dry. Capillary refill takes less than 2 seconds.   Normal perfusion of hands and " feet.   Psychiatric: She has a normal mood and affect.         PHQ-9 SCORE 1/15/2018 9/14/2018 12/20/2018   PHQ-9 Total Score 5 10 4       HECTOR-7 SCORE 1/15/2018 9/14/2018 12/20/2018   Total Score 8 6 6         ACT Total Scores 9/14/2018   ACT TOTAL SCORE (Goal Greater than or Equal to 20) 25   In the past 12 months, how many times did you visit the emergency room for your asthma without being admitted to the hospital? 0   In the past 12 months, how many times were you hospitalized overnight because of your asthma? 0         I personally reviewed results withpatient as listed below:   Diagnostic Test Results:  none     ASSESSMENT/PLAN:       ICD-10-CM    1. Attention deficit disorder, unspecified hyperactivity presence F98.8 amphetamine-dextroamphetamine (ADDERALL XR) 25 MG 24 hr capsule     amphetamine-dextroamphetamine (ADDERALL) 10 MG tablet     amphetamine-dextroamphetamine (ADDERALL XR) 25 MG 24 hr capsule     amphetamine-dextroamphetamine (ADDERALL XR) 25 MG 24 hr capsule     amphetamine-dextroamphetamine (ADDERALL) 10 MG tablet     amphetamine-dextroamphetamine (ADDERALL) 10 MG tablet   2. Raynaud's disease without gangrene I73.00 Anti Nuclear Suzette IgG by IFA with Reflex     Sedimentation Rate (ESR)     Rheumatoid factor     Cyclic Citrullinated Peptide Antibody IgG     RHEUMATOLOGY REFERRAL     Cyclic Citrullinated Peptide Antibody IgG     Rheumatoid factor     Sedimentation Rate (ESR)       1.  [facility] website printout reviewed and scanned today.  Adderall XR 25 mg #30 x 3 and Adderall immediate release 10 mg # 30 x 3 refill today.  Urine drug screen and contract both last completed on 9/14/2018.  Follow-up in 3 months for next refills.  2.  The pictures she shows me look consistent with Raynaud's syndrome.  Labs completed as above for evaluation.  Also referred to rheumatology to see if any treatment would be recommended.  She is having symptoms fairly frequently    Cyn Avendaño MD  [facility]  [facility]

## 2018-12-21 ASSESSMENT — ENCOUNTER SYMPTOMS
TREMORS: 0
COUGH: 0
NERVOUS/ANXIOUS: 0
FEVER: 0

## 2018-12-21 ASSESSMENT — ANXIETY QUESTIONNAIRES: GAD7 TOTAL SCORE: 6

## 2018-12-24 LAB
ANA PAT SER IF-IMP: ABNORMAL
ANA SER QL IF: POSITIVE
ANA TITR SER IF: ABNORMAL {TITER}
CCP AB SER IA-ACNC: <1 U/ML

## 2019-01-17 ENCOUNTER — TRANSFERRED RECORDS (OUTPATIENT)
Dept: HEALTH INFORMATION MANAGEMENT | Facility: OTHER | Age: 24
End: 2019-01-17

## 2019-02-04 ENCOUNTER — MYC MEDICAL ADVICE (OUTPATIENT)
Dept: FAMILY MEDICINE | Facility: OTHER | Age: 24
End: 2019-02-04

## 2019-02-04 DIAGNOSIS — I73.00 RAYNAUD'S SYNDROME: Primary | ICD-10-CM

## 2019-02-04 RX ORDER — NIFEDIPINE 30 MG
30 TABLET, EXTENDED RELEASE ORAL DAILY
Qty: 90 TABLET | Refills: 3 | COMMUNITY
Start: 2019-02-04 | End: 2019-03-07

## 2019-03-07 ENCOUNTER — MYC REFILL (OUTPATIENT)
Dept: FAMILY MEDICINE | Facility: OTHER | Age: 24
End: 2019-03-07

## 2019-03-07 DIAGNOSIS — I73.00 RAYNAUD'S SYNDROME: ICD-10-CM

## 2019-03-07 DIAGNOSIS — I73.00 RAYNAUD'S DISEASE WITHOUT GANGRENE: Primary | ICD-10-CM

## 2019-03-07 RX ORDER — NIFEDIPINE 30 MG
30 TABLET, EXTENDED RELEASE ORAL DAILY
Qty: 90 TABLET | Refills: 3 | Status: SHIPPED | OUTPATIENT
Start: 2019-03-07 | End: 2020-04-02

## 2019-03-22 ENCOUNTER — OFFICE VISIT (OUTPATIENT)
Dept: FAMILY MEDICINE | Facility: OTHER | Age: 24
End: 2019-03-22
Attending: FAMILY MEDICINE
Payer: COMMERCIAL

## 2019-03-22 VITALS
RESPIRATION RATE: 20 BRPM | DIASTOLIC BLOOD PRESSURE: 60 MMHG | BODY MASS INDEX: 22.5 KG/M2 | WEIGHT: 127 LBS | TEMPERATURE: 98.6 F | HEART RATE: 76 BPM | SYSTOLIC BLOOD PRESSURE: 120 MMHG | HEIGHT: 63 IN

## 2019-03-22 DIAGNOSIS — F98.8 ATTENTION DEFICIT DISORDER, UNSPECIFIED HYPERACTIVITY PRESENCE: ICD-10-CM

## 2019-03-22 DIAGNOSIS — I73.00 RAYNAUD'S DISEASE WITHOUT GANGRENE: ICD-10-CM

## 2019-03-22 PROCEDURE — 99213 OFFICE O/P EST LOW 20 MIN: CPT | Performed by: FAMILY MEDICINE

## 2019-03-22 RX ORDER — DEXTROAMPHETAMINE SACCHARATE, AMPHETAMINE ASPARTATE, DEXTROAMPHETAMINE SULFATE AND AMPHETAMINE SULFATE 2.5; 2.5; 2.5; 2.5 MG/1; MG/1; MG/1; MG/1
TABLET ORAL
Qty: 30 TABLET | Refills: 0 | Status: SHIPPED | OUTPATIENT
Start: 2019-04-20 | End: 2020-04-03

## 2019-03-22 RX ORDER — DEXTROAMPHETAMINE SACCHARATE, AMPHETAMINE ASPARTATE, DEXTROAMPHETAMINE SULFATE AND AMPHETAMINE SULFATE 2.5; 2.5; 2.5; 2.5 MG/1; MG/1; MG/1; MG/1
TABLET ORAL
Qty: 30 TABLET | Refills: 0 | Status: SHIPPED | OUTPATIENT
Start: 2019-03-22 | End: 2020-04-03

## 2019-03-22 RX ORDER — DEXTROAMPHETAMINE SACCHARATE, AMPHETAMINE ASPARTATE MONOHYDRATE, DEXTROAMPHETAMINE SULFATE AND AMPHETAMINE SULFATE 6.25; 6.25; 6.25; 6.25 MG/1; MG/1; MG/1; MG/1
25 CAPSULE, EXTENDED RELEASE ORAL EVERY MORNING
Qty: 30 CAPSULE | Refills: 0 | Status: SHIPPED | OUTPATIENT
Start: 2019-03-22 | End: 2020-04-03

## 2019-03-22 RX ORDER — DEXTROAMPHETAMINE SACCHARATE, AMPHETAMINE ASPARTATE MONOHYDRATE, DEXTROAMPHETAMINE SULFATE AND AMPHETAMINE SULFATE 6.25; 6.25; 6.25; 6.25 MG/1; MG/1; MG/1; MG/1
25 CAPSULE, EXTENDED RELEASE ORAL EVERY MORNING
Qty: 30 CAPSULE | Refills: 0 | Status: SHIPPED | OUTPATIENT
Start: 2019-04-20 | End: 2020-04-03

## 2019-03-22 RX ORDER — DEXTROAMPHETAMINE SACCHARATE, AMPHETAMINE ASPARTATE, DEXTROAMPHETAMINE SULFATE AND AMPHETAMINE SULFATE 2.5; 2.5; 2.5; 2.5 MG/1; MG/1; MG/1; MG/1
TABLET ORAL
Qty: 30 TABLET | Refills: 0 | Status: SHIPPED | OUTPATIENT
Start: 2019-05-18 | End: 2020-04-03

## 2019-03-22 RX ORDER — DEXTROAMPHETAMINE SACCHARATE, AMPHETAMINE ASPARTATE MONOHYDRATE, DEXTROAMPHETAMINE SULFATE AND AMPHETAMINE SULFATE 6.25; 6.25; 6.25; 6.25 MG/1; MG/1; MG/1; MG/1
25 CAPSULE, EXTENDED RELEASE ORAL EVERY MORNING
Qty: 30 CAPSULE | Refills: 0 | Status: SHIPPED | OUTPATIENT
Start: 2019-05-18 | End: 2020-04-03

## 2019-03-22 ASSESSMENT — ANXIETY QUESTIONNAIRES
5. BEING SO RESTLESS THAT IT IS HARD TO SIT STILL: NOT AT ALL
1. FEELING NERVOUS, ANXIOUS, OR ON EDGE: SEVERAL DAYS
GAD7 TOTAL SCORE: 2
7. FEELING AFRAID AS IF SOMETHING AWFUL MIGHT HAPPEN: NOT AT ALL
6. BECOMING EASILY ANNOYED OR IRRITABLE: NOT AT ALL
2. NOT BEING ABLE TO STOP OR CONTROL WORRYING: NOT AT ALL
IF YOU CHECKED OFF ANY PROBLEMS ON THIS QUESTIONNAIRE, HOW DIFFICULT HAVE THESE PROBLEMS MADE IT FOR YOU TO DO YOUR WORK, TAKE CARE OF THINGS AT HOME, OR GET ALONG WITH OTHER PEOPLE: SOMEWHAT DIFFICULT
3. WORRYING TOO MUCH ABOUT DIFFERENT THINGS: SEVERAL DAYS

## 2019-03-22 ASSESSMENT — PATIENT HEALTH QUESTIONNAIRE - PHQ9
5. POOR APPETITE OR OVEREATING: NOT AT ALL
SUM OF ALL RESPONSES TO PHQ QUESTIONS 1-9: 5

## 2019-03-22 ASSESSMENT — ENCOUNTER SYMPTOMS
COUGH: 0
FEVER: 0

## 2019-03-22 ASSESSMENT — PAIN SCALES - GENERAL: PAINLEVEL: NO PAIN (0)

## 2019-03-22 ASSESSMENT — MIFFLIN-ST. JEOR: SCORE: 1310.2

## 2019-03-22 NOTE — PROGRESS NOTES
SUBJECTIVE:   There are no exam notes on file for this visit.    George Mcknight is a 23 year old female who presents to clinic today for refill of the Adderall XR in the immediate release that she takes for ADHD.  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 She has been doing well so far this school year.  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  She will be living with her sister at that time.       HPI    I personally reviewed medications/allergies/history listed below:    Patient Active Problem List    Diagnosis Date Noted     Raynaud's disease without gangrene 03/22/2019     Priority: Medium     Exercise-induced asthma 09/14/2018     Priority: Medium     ADD (attention deficit disorder) 03/25/2017     Priority: Medium     Depression with anxiety 03/25/2017     Priority: Medium     Insomnia 03/25/2017     Priority: Medium     Migraine without status migrainosus, not intractable 03/25/2017     Priority: Medium     Controlled substance agreement signed 03/24/2017     Priority: Medium     Overview:   Adderall XR 25 mg and IR 10 mg, #30 of each per month       Past Medical History:   Diagnosis Date     Convergence insufficiency     2012,as a result of TBI - had to wear prism glasses for about 1 year.     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     Migraine without status migrainosus, not intractable     No Comments Provided     Other specified anxiety disorders     No Comments Provided     Other specified behavioral and emotional disorders with onset usually occurring in childhood and adolescence     No Comments Provided      History reviewed. No pertinent surgical history.  History reviewed. No pertinent family history.  Social History     Tobacco Use     Smoking status: Never Smoker     Smokeless tobacco: Never Used   Substance Use Topics     Alcohol use: Yes     Comment: Alcoholic Drinks/day: a few times a year     Social  History     Social History Narrative    Student at [employer].  Studying toward her BSN.  Family moved from [place], [place] fall 2016.  Both parents grew up in the [place] area.  Mom - Noel is RN, Clinic Nurse Manager at [employer].  Dad - Armando  Sister - Marianna is  and works as an RN at [employer] in [place].     Current Outpatient Medications   Medication Sig Dispense Refill     albuterol (PROAIR HFA/PROVENTIL HFA/VENTOLIN HFA) 108 (90 Base) MCG/ACT inhaler Inhale 2 puffs into the lungs every 4 hours as needed for shortness of breath / dyspnea or wheezing 1 Inhaler 11     [START ON 5/18/2019] amphetamine-dextroamphetamine (ADDERALL XR) 25 MG 24 hr capsule Take 1 capsule (25 mg) by mouth every morning 30 capsule 0     [START ON 4/20/2019] amphetamine-dextroamphetamine (ADDERALL XR) 25 MG 24 hr capsule Take 1 capsule (25 mg) by mouth every morning 30 capsule 0     amphetamine-dextroamphetamine (ADDERALL XR) 25 MG 24 hr capsule Take 1 capsule (25 mg) by mouth every morning 30 capsule 0     [START ON 5/18/2019] amphetamine-dextroamphetamine (ADDERALL) 10 MG tablet Take 1 tablet by mouth in the evening as needed 30 tablet 0     [START ON 4/20/2019] amphetamine-dextroamphetamine (ADDERALL) 10 MG tablet Take 1 tablet by mouth in the evening as needed 30 tablet 0     amphetamine-dextroamphetamine (ADDERALL) 10 MG tablet Take 1 tablet by mouth in the evening as needed 30 tablet 0     Cholecalciferol (VITAMIN D3) 1000 UNITS CAPS Take 1,000 Units by mouth       citalopram (CELEXA) 20 MG tablet Take 1 tablet (20 mg) by mouth daily 90 tablet 3     NIFEdipine ER (ADALAT CC) 30 MG 24 hr tablet Take 1 tablet (30 mg) by mouth daily 90 tablet 3     propranolol (INDERAL LA) 60 MG 24 hr capsule Take 1 capsule (60 mg) by mouth daily 30 capsule 11     pseudoePHEDrine (SUDAFED) 60 MG tablet Take 60 mg by mouth       SUMAtriptan (IMITREX) 50 MG tablet Take 1 tablet (50 mg) by mouth at onset of headache for migraine May repeat after 2  "hours as needed.  Max dose 200 mg per 24 hours. 9 tablet 11     zolpidem (AMBIEN) 10 MG tablet Take 1 tablet (10 mg) by mouth nightly as needed for sleep 30 tablet 2     Allergies   Allergen Reactions     Cats Other (See Comments)     Swelling of face/eyes/throat     No Clinical Screening - See Comments Other (See Comments)     Trees - itchy  Swelling of face/eyes/throat  Trees - itchy     Seasonal Allergies Other (See Comments)     Trees - itchy     Trichophyton Other (See Comments)     Swelling of face/eyes/throat       Review of Systems   Constitutional: Negative for fever.   Respiratory: Negative for cough.    Cardiovascular: Negative for peripheral edema.   Psychiatric/Behavioral: Negative for mood changes.        OBJECTIVE:     /60 (BP Location: Right arm, Patient Position: Sitting, Cuff Size: Adult Regular)   Pulse 76   Temp 98.6  F (37  C) (Tympanic)   Resp 20   Ht 1.6 m (5' 3\")   Wt 57.6 kg (127 lb)   BMI 22.50 kg/m    Body mass index is 22.5 kg/m .  Physical Exam   Constitutional: She appears well-developed.   HENT:   Head: Normocephalic.   Eyes: Pupils are equal, round, and reactive to light.   Neck: Normal range of motion. Neck supple.   Cardiovascular: Normal rate, regular rhythm, normal heart sounds and intact distal pulses.   No murmur heard.  Pulmonary/Chest: Effort normal and breath sounds normal. She has no wheezes. She has no rales.   Musculoskeletal: She exhibits no edema.   Lymphadenopathy:     She has no cervical adenopathy.   Psychiatric: She has a normal mood and affect.         PHQ-9 SCORE 9/14/2018 12/20/2018 3/22/2019   PHQ-9 Total Score 10 4 5       HECTOR-7 SCORE 9/14/2018 12/20/2018 3/22/2019   Total Score 6 6 2             I personally reviewed results withpatient as listed below:   Diagnostic Test Results:  none     ASSESSMENT/PLAN:       ICD-10-CM    1. Attention deficit disorder, unspecified hyperactivity presence F98.8 amphetamine-dextroamphetamine (ADDERALL XR) 25 MG 24 " hr capsule     amphetamine-dextroamphetamine (ADDERALL XR) 25 MG 24 hr capsule     amphetamine-dextroamphetamine (ADDERALL XR) 25 MG 24 hr capsule     amphetamine-dextroamphetamine (ADDERALL) 10 MG tablet     amphetamine-dextroamphetamine (ADDERALL) 10 MG tablet     amphetamine-dextroamphetamine (ADDERALL) 10 MG tablet   2. Raynaud's disease without gangrene I73.00        1.  Adderall XR 25 mg #30 x 3 and Adderall IR 10 mg #30 x 3 refill today.  [facility] website printout reviewed and scanned today.  Contract and drug screen both last completed on 9/14/18.  Follow up in 3 months.  2.  She has recently started on Nifedipine ER by rheumatology for her Rayndau'ds symptoms.  Blood pressure stable today.    Cyn Avendaño MD  [facility]

## 2019-03-23 ASSESSMENT — ANXIETY QUESTIONNAIRES: GAD7 TOTAL SCORE: 2

## 2019-10-03 DIAGNOSIS — F41.8 DEPRESSION WITH ANXIETY: ICD-10-CM

## 2019-10-03 DIAGNOSIS — F51.01 PRIMARY INSOMNIA: ICD-10-CM

## 2019-10-03 RX ORDER — ZOLPIDEM TARTRATE 10 MG/1
TABLET ORAL
Qty: 30 TABLET | Refills: 0 | Status: SHIPPED | OUTPATIENT
Start: 2019-10-03 | End: 2019-10-04

## 2019-10-03 RX ORDER — CITALOPRAM HYDROBROMIDE 20 MG/1
20 TABLET ORAL DAILY
Qty: 90 TABLET | Refills: 0 | Status: SHIPPED | OUTPATIENT
Start: 2019-10-03 | End: 2020-04-02

## 2019-10-03 NOTE — TELEPHONE ENCOUNTER
"Refill request from [facility] for:  Celexa (last filled 8/27/2018 for 90 X 3) and Ambien (last filled 10/18/2018 for 30 X 2)    Pt is in town for a short time.      LOV 3/22/2019     Will route for consideration for limited refill until Pt can schedule appt.    Requested Prescriptions   Pending Prescriptions Disp Refills     citalopram (CELEXA) 20 MG tablet [Pharmacy Med Name: CITALOPRAM HBR 20 MG TABLET] 90 tablet 3     Sig: Take 1 tablet (20 mg) by mouth daily       SSRIs Protocol Failed - 10/3/2019  3:03 PM        Failed - PHQ-9 score less than 5 in past 6 months     Please review last PHQ-9 score.           Failed - Recent (6 mo) or future (30 days) visit within the authorizing provider's specialty     Patient had office visit in the last 6 months or has a visit in the next 30 days with authorizing provider or within the authorizing provider's specialty.  See \"Patient Info\" tab in inbasket, or \"Choose Columns\" in Meds & Orders section of the refill encounter.            Passed - Medication is active on med list        Passed - Patient is age 18 or older        Passed - No active pregnancy on record        Passed - No positive pregnancy test in last 12 months        zolpidem (AMBIEN) 10 MG tablet [Pharmacy Med Name: ZOLPIDEM TARTRATE 10 MG TABLET] 30 tablet      Sig: TAKE 1 TABLET BY MOUTH nightly AS NEEDED FOR SLEEP.       There is no refill protocol information for this order        Unable to complete prescription refill per RN Medication Refill Policy.................... Doretha Johnson RN ....................  10/3/2019   4:07 PM        "

## 2019-10-04 DIAGNOSIS — F51.01 PRIMARY INSOMNIA: ICD-10-CM

## 2019-10-04 RX ORDER — ZOLPIDEM TARTRATE 10 MG/1
TABLET ORAL
Qty: 30 TABLET | Refills: 2 | Status: SHIPPED | OUTPATIENT
Start: 2019-10-04 | End: 2020-04-20

## 2019-10-04 NOTE — TELEPHONE ENCOUNTER
See refill request below , she is scheduling her physical in December. Christi Mendoza LPN ....................10/4/2019  9:03 AM

## 2019-10-16 ENCOUNTER — OFFICE VISIT (OUTPATIENT)
Dept: FAMILY MEDICINE | Facility: OTHER | Age: 24
End: 2019-10-16
Attending: PHYSICIAN ASSISTANT
Payer: COMMERCIAL

## 2019-10-16 VITALS
TEMPERATURE: 97.9 F | RESPIRATION RATE: 18 BRPM | DIASTOLIC BLOOD PRESSURE: 80 MMHG | BODY MASS INDEX: 21.89 KG/M2 | SYSTOLIC BLOOD PRESSURE: 108 MMHG | HEIGHT: 64 IN | WEIGHT: 128.2 LBS | HEART RATE: 80 BPM

## 2019-10-16 DIAGNOSIS — L03.211 FACIAL CELLULITIS: Primary | ICD-10-CM

## 2019-10-16 PROCEDURE — 87070 CULTURE OTHR SPECIMN AEROBIC: CPT | Mod: ZL | Performed by: PHYSICIAN ASSISTANT

## 2019-10-16 PROCEDURE — 99213 OFFICE O/P EST LOW 20 MIN: CPT | Performed by: PHYSICIAN ASSISTANT

## 2019-10-16 PROCEDURE — 87077 CULTURE AEROBIC IDENTIFY: CPT | Mod: ZL | Performed by: PHYSICIAN ASSISTANT

## 2019-10-16 RX ORDER — CEPHALEXIN 500 MG/1
500 CAPSULE ORAL 3 TIMES DAILY
Qty: 30 CAPSULE | Refills: 0 | Status: SHIPPED | OUTPATIENT
Start: 2019-10-16 | End: 2019-10-18 | Stop reason: ALTCHOICE

## 2019-10-16 ASSESSMENT — MIFFLIN-ST. JEOR: SCORE: 1326.51

## 2019-10-16 ASSESSMENT — PAIN SCALES - GENERAL: PAINLEVEL: SEVERE PAIN (7)
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
## 2019-10-16 NOTE — NURSING NOTE
Patient presents to the clinic today for a follow up from a fall on 10/12. Med rec complete. Larissa Martinez LPN.................. 10/16/2019 1:01 PM

## 2019-10-16 NOTE — PATIENT INSTRUCTIONS
Cellulitis - Take the antibiotic as prescribed. Antibiotic has been sent to pharmacy. Please take full course of the antibiotic even if symptoms have completely resolved. This helps prevent against antibiotic resistance.     Watch for any signs of increasing infection (redness, pus, increased pain (may be along the tendon and back of hand if the hand is involved), increased swelling or fever). Call if any of these signs occur. Monitor for fevers or chills.    You may draw a line around the area of redness to monitor the area. Please return to clinic if redness is continuing to spread after 2-3 days.    Call or return to clinic as needed if your symptoms worsen or fail to improve as anticipated.    Wash twice daily with warm soapy water.  Use soap that is antibacterial.  Can put cool or warm compresses on the areas a few times daily as needed.

## 2019-10-18 ENCOUNTER — TELEPHONE (OUTPATIENT)
Dept: FAMILY MEDICINE | Facility: OTHER | Age: 24
End: 2019-10-18

## 2019-10-18 DIAGNOSIS — L03.211 FACIAL CELLULITIS: Primary | ICD-10-CM

## 2019-10-18 LAB
BACTERIA SPEC CULT: ABNORMAL
BACTERIA SPEC CULT: ABNORMAL
SPECIMEN SOURCE: ABNORMAL

## 2019-10-18 RX ORDER — AMOXICILLIN 875 MG
875 TABLET ORAL 2 TIMES DAILY
Qty: 28 TABLET | Refills: 0 | Status: SHIPPED | OUTPATIENT
Start: 2019-10-18 | End: 2020-04-20

## 2019-10-18 NOTE — TELEPHONE ENCOUNTER
Patient notified of providers note.  Verbal order given to Saad per patient request, she is currently in [place].      Macy Deng LPN 10/18/2019 12:47 PM

## 2019-10-18 NOTE — TELEPHONE ENCOUNTER
Your skin would culture grew out bacteria that is resistant to the antibiotic that we treated you with. You grew out 2 different types of bacteria (staph aureus and group B strep).      I am switching your antibiotic to amoxicillin.  Please discontinue the old antibiotic (keflex) and start the new one.  Finish the whole course of the new antibiotic.  Return to clinic with change/worsening of symptoms.     Lucila Gonzalez PA-C ....................  10/18/2019   12:13 PM

## 2020-03-11 ENCOUNTER — HEALTH MAINTENANCE LETTER (OUTPATIENT)
Age: 25
End: 2020-03-11

## 2020-04-01 DIAGNOSIS — F41.8 DEPRESSION WITH ANXIETY: ICD-10-CM

## 2020-04-01 DIAGNOSIS — I73.00 RAYNAUD'S DISEASE WITHOUT GANGRENE: ICD-10-CM

## 2020-04-01 DIAGNOSIS — G43.809 OTHER MIGRAINE WITHOUT STATUS MIGRAINOSUS, NOT INTRACTABLE: ICD-10-CM

## 2020-04-01 DIAGNOSIS — F98.8 ATTENTION DEFICIT DISORDER, UNSPECIFIED HYPERACTIVITY PRESENCE: ICD-10-CM

## 2020-04-02 ENCOUNTER — E-VISIT (OUTPATIENT)
Dept: FAMILY MEDICINE | Facility: OTHER | Age: 25
End: 2020-04-02
Payer: COMMERCIAL

## 2020-04-02 DIAGNOSIS — F90.9 ATTENTION DEFICIT HYPERACTIVITY DISORDER (ADHD), UNSPECIFIED ADHD TYPE: Primary | ICD-10-CM

## 2020-04-02 DIAGNOSIS — F98.8 ATTENTION DEFICIT DISORDER, UNSPECIFIED HYPERACTIVITY PRESENCE: ICD-10-CM

## 2020-04-02 PROCEDURE — 99421 OL DIG E/M SVC 5-10 MIN: CPT | Performed by: FAMILY MEDICINE

## 2020-04-02 RX ORDER — DEXTROAMPHETAMINE SACCHARATE, AMPHETAMINE ASPARTATE, DEXTROAMPHETAMINE SULFATE AND AMPHETAMINE SULFATE 2.5; 2.5; 2.5; 2.5 MG/1; MG/1; MG/1; MG/1
TABLET ORAL
Qty: 30 TABLET | Refills: 0 | OUTPATIENT
Start: 2020-04-02

## 2020-04-02 RX ORDER — DEXTROAMPHETAMINE SACCHARATE, AMPHETAMINE ASPARTATE MONOHYDRATE, DEXTROAMPHETAMINE SULFATE AND AMPHETAMINE SULFATE 6.25; 6.25; 6.25; 6.25 MG/1; MG/1; MG/1; MG/1
CAPSULE, EXTENDED RELEASE ORAL
Qty: 30 CAPSULE | Refills: 0 | OUTPATIENT
Start: 2020-04-02

## 2020-04-02 RX ORDER — CITALOPRAM HYDROBROMIDE 20 MG/1
20 TABLET ORAL DAILY
Qty: 90 TABLET | Refills: 1 | Status: SHIPPED | OUTPATIENT
Start: 2020-04-02 | End: 2020-10-08

## 2020-04-02 RX ORDER — NIFEDIPINE 30 MG/1
TABLET, EXTENDED RELEASE ORAL
Qty: 90 TABLET | Refills: 3 | Status: SHIPPED | OUTPATIENT
Start: 2020-04-02 | End: 2021-07-12

## 2020-04-02 RX ORDER — PROPRANOLOL HCL 60 MG
CAPSULE, EXTENDED RELEASE 24HR ORAL
Qty: 30 CAPSULE | Refills: 11 | Status: SHIPPED | OUTPATIENT
Start: 2020-04-02 | End: 2020-10-08

## 2020-04-02 NOTE — TELEPHONE ENCOUNTER
Contact patient to have her set up phone or video visit with Cyn Avendaño MD next week for controlled substance prescription.  Mimi Richards MD

## 2020-04-02 NOTE — TELEPHONE ENCOUNTER
"Patient returns call and updated on Dr. Sushma Lebron response. Patient verbalized understanding and intent to comply. I will go on line and schedule that\". Betzy Victoria RN on 4/2/2020 at 12:59 PM    "

## 2020-04-02 NOTE — TELEPHONE ENCOUNTER
Patient remains unavailable via phone. Another message left requesting a return call to update on physician's response- see below.   Betzy Victoria RN on 4/2/2020 at 12:19 PM

## 2020-04-02 NOTE — TELEPHONE ENCOUNTER
Left message for patient requesting a return call to determine if she has enough until PCP returns on Monday-04/06/2020.     Amphetamine-dextroamphetamine (ADDERALL) 10 MG tablet and 25 mg tab  Last Written Prescription Date:  05/18/2019  Last Fill Quantity: 30,   # refills: 0    Propranolol (INDERAL LA) 60 MG 24 hr capsule   Last Written Prescription Date: 10/19/2018  Last Fill Quantity: 30,   # refills: 11      NIFEdipine ER (ADALAT CC) 30 MG 24 hr tablet  Last Written Prescription Date: 03/07/2019  Last Fill Quantity: 90,   # refills: 3    Citalopram (CELEXA) 20 MG tablet   Last Written Prescription Date:  10/03/2019  Last Fill Quantity: 90,   # refills: 0  Last Office Visit: 03/22/2019  Future Office visit:       Routing refill request to provider for review/approval.     Unable to complete prescription refill per RNMedication Refill Policy.................... Betzy Victoria RN ....................  4/2/2020   11:30 AM

## 2020-04-03 RX ORDER — DEXTROAMPHETAMINE SACCHARATE, AMPHETAMINE ASPARTATE MONOHYDRATE, DEXTROAMPHETAMINE SULFATE AND AMPHETAMINE SULFATE 6.25; 6.25; 6.25; 6.25 MG/1; MG/1; MG/1; MG/1
25 CAPSULE, EXTENDED RELEASE ORAL EVERY MORNING
Qty: 7 CAPSULE | Refills: 0 | Status: SHIPPED | OUTPATIENT
Start: 2020-04-03 | End: 2020-04-20

## 2020-04-03 RX ORDER — DEXTROAMPHETAMINE SACCHARATE, AMPHETAMINE ASPARTATE, DEXTROAMPHETAMINE SULFATE AND AMPHETAMINE SULFATE 2.5; 2.5; 2.5; 2.5 MG/1; MG/1; MG/1; MG/1
TABLET ORAL
Qty: 7 TABLET | Refills: 0 | Status: SHIPPED | OUTPATIENT
Start: 2020-04-03 | End: 2020-04-20

## 2020-04-09 ENCOUNTER — MYC MEDICAL ADVICE (OUTPATIENT)
Dept: FAMILY MEDICINE | Facility: OTHER | Age: 25
End: 2020-04-09

## 2020-04-09 DIAGNOSIS — G43.809 OTHER MIGRAINE, NOT INTRACTABLE, WITHOUT STATUS MIGRAINOSUS: ICD-10-CM

## 2020-04-09 RX ORDER — SUMATRIPTAN 50 MG/1
50 TABLET, FILM COATED ORAL
Qty: 9 TABLET | Refills: 11 | Status: SHIPPED | OUTPATIENT
Start: 2020-04-09 | End: 2021-07-12

## 2020-04-09 NOTE — TELEPHONE ENCOUNTER
"[facility] sent Rx request for the following:      sumatriptan 50 mg tablet   Sig: Take 1 tablet (50 mg) by mouth at onset of headache for migraine May repeat after 2 hours as needed.  Max dose 200 mg per 24 hours.      Last Prescription Date:   10/18/2018  Last Fill Qty/Refills:         9, R-11    Last Office Visit:              3/22/2019   Future Office visit:           none    Requested Prescriptions   Pending Prescriptions Disp Refills     SUMAtriptan (IMITREX) 50 MG tablet [Pharmacy Med Name: sumatriptan 50 mg tablet] 9 tablet 11     Sig: Take 1 tablet (50 mg) by mouth at onset of headache for migraine May repeat after 2 hours as needed. Max dose 200 mg per 24 hours.       Serotonin Agonists Failed - 4/9/2020  2:23 PM        Failed - Serotonin Agonist request needs review.     Please review patient's record. If patient has had 8 or more treatments in the past month, please forward to provider.          Passed - Blood pressure under 140/90 in past 12 months     BP Readings from Last 3 Encounters:   10/16/19 108/80   03/22/19 120/60   09/14/18 118/62                 Passed - Recent (12 mo) or future (30 days) visit within the authorizing provider's specialty     Patient has had an office visit with the authorizing provider or a provider within the authorizing providers department within the previous 12 mos or has a future within next 30 days. See \"Patient Info\" tab in inbasket, or \"Choose Columns\" in Meds & Orders section of the refill encounter.              Passed - Medication is active on med list        Passed - Patient is age 18 or older        Passed - No active pregnancy on record        Passed - No positive pregnancy test in past 12 months           Unable to complete prescription refill per RN Medication Refill Policy.................... Arash Powell RN ....................  4/9/2020   2:34 PM              "

## 2020-04-20 ENCOUNTER — VIRTUAL VISIT (OUTPATIENT)
Dept: INTERNAL MEDICINE | Facility: OTHER | Age: 25
End: 2020-04-20
Attending: FAMILY MEDICINE
Payer: COMMERCIAL

## 2020-04-20 DIAGNOSIS — F51.01 PRIMARY INSOMNIA: ICD-10-CM

## 2020-04-20 DIAGNOSIS — F98.8 ATTENTION DEFICIT DISORDER, UNSPECIFIED HYPERACTIVITY PRESENCE: Primary | ICD-10-CM

## 2020-04-20 PROCEDURE — 99213 OFFICE O/P EST LOW 20 MIN: CPT | Mod: TEL | Performed by: NURSE PRACTITIONER

## 2020-04-20 RX ORDER — DEXTROAMPHETAMINE SACCHARATE, AMPHETAMINE ASPARTATE MONOHYDRATE, DEXTROAMPHETAMINE SULFATE AND AMPHETAMINE SULFATE 6.25; 6.25; 6.25; 6.25 MG/1; MG/1; MG/1; MG/1
25 CAPSULE, EXTENDED RELEASE ORAL EVERY MORNING
Qty: 90 CAPSULE | Refills: 0 | Status: SHIPPED | OUTPATIENT
Start: 2020-04-20 | End: 2020-07-19

## 2020-04-20 RX ORDER — ZOLPIDEM TARTRATE 10 MG/1
10 TABLET ORAL
Qty: 30 TABLET | Refills: 2 | COMMUNITY
Start: 2020-04-20 | End: 2020-08-07

## 2020-04-20 RX ORDER — DEXTROAMPHETAMINE SACCHARATE, AMPHETAMINE ASPARTATE MONOHYDRATE, DEXTROAMPHETAMINE SULFATE AND AMPHETAMINE SULFATE 6.25; 6.25; 6.25; 6.25 MG/1; MG/1; MG/1; MG/1
CAPSULE, EXTENDED RELEASE ORAL
COMMUNITY
Start: 2020-04-03 | End: 2020-04-20

## 2020-04-20 RX ORDER — DEXTROAMPHETAMINE SACCHARATE, AMPHETAMINE ASPARTATE, DEXTROAMPHETAMINE SULFATE AND AMPHETAMINE SULFATE 2.5; 2.5; 2.5; 2.5 MG/1; MG/1; MG/1; MG/1
TABLET ORAL
Qty: 50 TABLET | Refills: 0 | Status: SHIPPED | OUTPATIENT
Start: 2020-04-20 | End: 2020-10-08

## 2020-04-20 ASSESSMENT — PAIN SCALES - GENERAL: PAINLEVEL: NO PAIN (0)

## 2020-04-20 NOTE — NURSING NOTE
"No chief complaint on file.    Patient is doing a medication follow up today.     Initial LMP 04/18/2020   Breastfeeding No  Estimated body mass index is 22.01 kg/m  as calculated from the following:    Height as of 10/16/19: 1.626 m (5' 4\").    Weight as of 10/16/19: 58.2 kg (128 lb 3.2 oz).    Medication Reconciliation: complete      Sol Soler LPN    "

## 2020-04-20 NOTE — PROGRESS NOTES
"George Mcknight is a 24 year old female who is being evaluated via a billable telephone visit.      The patient has been notified of following: Sol Soler LPN .......  4/20/2020  1:28 PM        \"This telephone visit will be conducted via a call between you and your physician/provider. We have found that certain health care needs can be provided without the need for a physical exam.  This service lets us provide the care you need with a short phone conversation.  If a prescription is necessary we can send it directly to your pharmacy.  If lab work is needed we can place an order for that and you can then stop by our lab to have the test done at a later time.    Telephone visits are billed at different rates depending on your insurance coverage. During this emergency period, for some insurers they may be billed the same as an in-person visit.  Please reach out to your insurance provider with any questions.    If during the course of the call the physician/provider feels a telephone visit is not appropriate, you will not be charged for this service.\"    Patient has given verbal consent for Telephone visit?  Yes    How would you like to obtain your AVS? MyChart    Subjective     George Mcknight is a 24 year old female who presents to clinic today for the following health issues:    She had tried to wean her self off the Adderall and had negative effects in personal health and in her school work.  She was unable to get an appointment to see her PCP. She just needs refills of her medication only. 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 Does have insomnia also for which she uses Ambien.    Reviewed and updated as needed this visit by Provider         Review of Systems   ROS COMP: CONSTITUTIONAL: NEGATIVE for fever, chills, change in weight  ENT/MOUTH: NEGATIVE for ear, mouth and throat problems  RESP: NEGATIVE for significant cough or " SOB  CV: NEGATIVE for chest pain, palpitations or peripheral edema       Objective   Reported vitals:  There were no vitals taken for this visit.     PSYCH: Alert and oriented times 3; coherent speech, normal   rate and volume, able to articulate logical thoughts, able   to abstract reason, no tangential thoughts, no hallucinations   or delusions  Her affect is normal and pleasant  RESP: No cough, no audible wheezing, able to talk in full sentences  Remainder of exam unable to be completed due to telephone visits      Assessment/Plan:  1. Attention deficit disorder, unspecified hyperactivity presence  - Refills clarified and sent to [facility] pharmacy.  - Patient will call for 3 month check up with PCP.  - amphetamine-dextroamphetamine (ADDERALL XR) 25 MG 24 hr capsule; Take 1 capsule (25 mg) by mouth every morning  Dispense: 90 capsule; Refill: 0  - amphetamine-dextroamphetamine (ADDERALL) 10 MG tablet; Take 1 tablet by mouth in the evening as needed  Dispense: 50 tablet; Refill: 0    2. Primary insomnia  - This was NOT refilled. Patient had obtained a refill in early April but it had not updated in computer. Dose verification: zolpidem (AMBIEN) 10 MG tablet; Take 1 tablet (10 mg) by mouth nightly as needed  Dispense: 30 tablet; Refill: 2    Return in about 3 months (around 7/20/2020).    Phone call duration:  8 minutes    BENJIE Schwartz, AGNP-C  Internal Medicine  04/20/2020 1:47 PM

## 2020-07-15 ENCOUNTER — ALLIED HEALTH/NURSE VISIT (OUTPATIENT)
Dept: FAMILY MEDICINE | Facility: OTHER | Age: 25
End: 2020-07-15
Attending: PHYSICIAN ASSISTANT
Payer: COMMERCIAL

## 2020-07-15 ENCOUNTER — VIRTUAL VISIT (OUTPATIENT)
Dept: FAMILY MEDICINE | Facility: OTHER | Age: 25
End: 2020-07-15
Attending: NURSE PRACTITIONER
Payer: COMMERCIAL

## 2020-07-15 VITALS — BODY MASS INDEX: 21.66 KG/M2 | HEIGHT: 65 IN | WEIGHT: 130 LBS

## 2020-07-15 DIAGNOSIS — Z20.822 COVID-19 RULED OUT: Primary | ICD-10-CM

## 2020-07-15 DIAGNOSIS — Z20.822 EXPOSURE TO COVID-19 VIRUS: Primary | ICD-10-CM

## 2020-07-15 PROCEDURE — 99212 OFFICE O/P EST SF 10 MIN: CPT | Mod: TEL | Performed by: NURSE PRACTITIONER

## 2020-07-15 PROCEDURE — C9803 HOPD COVID-19 SPEC COLLECT: HCPCS

## 2020-07-15 PROCEDURE — U0003 INFECTIOUS AGENT DETECTION BY NUCLEIC ACID (DNA OR RNA); SEVERE ACUTE RESPIRATORY SYNDROME CORONAVIRUS 2 (SARS-COV-2) (CORONAVIRUS DISEASE [COVID-19]), AMPLIFIED PROBE TECHNIQUE, MAKING USE OF HIGH THROUGHPUT TECHNOLOGIES AS DESCRIBED BY CMS-2020-01-R: HCPCS | Mod: ZL | Performed by: NURSE PRACTITIONER

## 2020-07-15 PROCEDURE — 99207 ZZC NO CHARGE NURSE ONLY: CPT

## 2020-07-15 ASSESSMENT — PAIN SCALES - GENERAL: PAINLEVEL: NO PAIN (0)

## 2020-07-15 ASSESSMENT — MIFFLIN-ST. JEOR: SCORE: 1342.62

## 2020-07-15 NOTE — NURSING NOTE
"Chief Complaint   Patient presents with     Covid 19 Testing     Patient was camping and had direct exposure to covid from a fellow camper.   She has not been home to check her temp but has had chills.    Initial Ht 1.638 m (5' 4.5\")   Wt 59 kg (130 lb)   LMP 07/09/2020   Breastfeeding No   BMI 21.97 kg/m   Estimated body mass index is 21.97 kg/m  as calculated from the following:    Height as of this encounter: 1.638 m (5' 4.5\").    Weight as of this encounter: 59 kg (130 lb).  Medication Reconciliation: complete    Rachele Burrows LPN  "

## 2020-07-15 NOTE — LETTER
July 21, 2020        George MARITZA Juan Luis  [address] [place] [address]    This letter provides a written record that you were tested for COVID-19 on 7/15/2020.       Your result was negative. This means that we didn t find the virus that causes COVID-19 in your sample. A test may show negative when you do actually have the virus. This can happen when the virus is in the early stages of infection, before you feel illness symptoms.    If you have symptoms   Stay home and away from others (self-isolate) until you meet ALL of the guidelines below:    You ve had no fever--and no medicine that reduces fever--for 3 full days (72 hours). And      Your other symptoms have gotten better. For example, your cough or breathing has improved. And     At least 10 days have passed since your symptoms started.    During this time:    Stay home. Don t go to work, school or anywhere else.     Stay in your own room, including for meals. Use your own bathroom if you can.    Stay away from others in your home. No hugging, kissing or shaking hands. No visitors.    Clean  high touch  surfaces often (doorknobs, counters, handles, etc.). Use a household cleaning spray or wipes. You can find a full list on the EPA website at www.epa.gov/pesticide-registration/list-n-disinfectants-use-against-sars-cov-2.    Cover your mouth and nose with a mask, tissue or washcloth to avoid spreading germs.    Wash your hands and face often with soap and water.    Going back to work  Check with your employer for any guidelines to follow for going back to work.    Employers: This document serves as formal notice that your employee tested negative for COVID-19, as of the testing date shown above.

## 2020-07-15 NOTE — PROGRESS NOTES
"George Mcknight is a 24 year old female who is being evaluated via a billable telephone visit.      The patient has been notified of following:     \"This telephone visit will be conducted via a call between you and your physician/provider. We have found that certain health care needs can be provided without the need for a physical exam.  This service lets us provide the care you need with a short phone conversation.  If a prescription is necessary we can send it directly to your pharmacy.  If lab work is needed we can place an order for that and you can then stop by our lab to have the test done at a later time.    Telephone visits are billed at different rates depending on your insurance coverage. During this emergency period, for some insurers they may be billed the same as an in-person visit.  Please reach out to your insurance provider with any questions.    If during the course of the call the physician/provider feels a telephone visit is not appropriate, you will not be charged for this service.\"    Patient has given verbal consent for Telephone visit?  Yes    What phone number would you like to be contacted at? [phone]    How would you like to obtain your AVS? Mail a copy    Subjective     George Mcknight is a 24 year old female who presents via phone visit and curbside testing today for the following health issues:  covid testing    HPI  States she had direct exposure to a friend who tested positive for covid.  States exposure was 4 days ago, states in close contact for 30 minutes and in same room for 2 hours.  States she has chills. Unable to check for fever.  Chronic headaches so not sure is new.   No body aches.  Runny and stuffy nose but has allergies.  No cough.  No chest congestion, tightness, or heaviness.  Not feeling winded or shortness of breath.  No change in appetite.  No nausea or vomiting.  No diarrhea.  Energy at baseline.        Patient Active Problem List   Diagnosis     ADD (attention " deficit disorder)     Controlled substance agreement signed     Depression with anxiety     Insomnia     Migraine without status migrainosus, not intractable     Exercise-induced asthma     Raynaud's disease without gangrene     No past surgical history on file.    Social History     Tobacco Use     Smoking status: Never Smoker     Smokeless tobacco: Never Used   Substance Use Topics     Alcohol use: Yes     Comment: Alcoholic Drinks/day: a few times a year     No family history on file.      Current Outpatient Medications   Medication Sig Dispense Refill     albuterol (PROAIR HFA/PROVENTIL HFA/VENTOLIN HFA) 108 (90 Base) MCG/ACT inhaler Inhale 2 puffs into the lungs every 4 hours as needed for shortness of breath / dyspnea or wheezing 1 Inhaler 11     amphetamine-dextroamphetamine (ADDERALL XR) 25 MG 24 hr capsule Take 1 capsule (25 mg) by mouth every morning 90 capsule 0     amphetamine-dextroamphetamine (ADDERALL) 10 MG tablet Take 1 tablet by mouth in the evening as needed 50 tablet 0     Cholecalciferol (VITAMIN D3) 1000 UNITS CAPS Take 1,000 Units by mouth       citalopram (CELEXA) 20 MG tablet Take 1 tablet (20 mg) by mouth daily 90 tablet 1     NIFEdipine ER OSMOTIC (PROCARDIA XL) 30 MG 24 hr tablet TAKE 1 TABLET BY MOUTH ONCE DAILY 90 tablet 3     propranolol ER (INDERAL LA) 60 MG 24 hr capsule TAKE 1 CAPSULE BY MOUTH ONCE DAILY 30 capsule 11     pseudoePHEDrine (SUDAFED) 60 MG tablet Take 60 mg by mouth       SUMAtriptan (IMITREX) 50 MG tablet Take 1 tablet (50 mg) by mouth at onset of headache for migraine May repeat after 2 hours as needed. Max dose 200 mg per 24 hours. 9 tablet 11     zolpidem (AMBIEN) 10 MG tablet Take 1 tablet (10 mg) by mouth nightly as needed 30 tablet 2     Allergies   Allergen Reactions     Cats Other (See Comments)     Swelling of face/eyes/throat     No Clinical Screening - See Comments Other (See Comments)     Trees - itchy  Swelling of face/eyes/throat  Trees - itchy      Seasonal Allergies Other (See Comments)     Trees - itchy     Trichophyton Other (See Comments)     Swelling of face/eyes/throat         Objective   Reported vitals:  There were no vitals taken for this visit.   General: healthy, alert, no distress and cooperative  PSYCH: Alert and oriented times 3; coherent speech, normal   rate and volume, able to articulate logical thoughts, able   to abstract reason, no tangential thoughts, no hallucinations   or delusions  Her affect is normal  RESP: No cough, no audible wheezing, able to talk in full sentences  Remainder of exam unable to be completed due to telephone visits    Diagnostic Test Results:  Labs reviewed in Epic  none       Assessment/Plan:    1. Exposure to COVID-19 virus    - Asymptomatic COVID-19 Virus (Coronavirus) by PCR    Come to clinic for [facility] COVID-19 test, phone number provided and process discussed.  Self quarantine until test results are available.     Discussed warning signs/symptoms indicative of need to f/u  Come to be seen in clinic if worsening or concerns     Phone call duration:  5 minutes    Jeniffer Mcclain NP on 7/15/2020 at 9:32 AM

## 2020-07-16 LAB
SARS-COV-2 RNA SPEC QL NAA+PROBE: NOT DETECTED
SPECIMEN SOURCE: NORMAL

## 2020-08-06 DIAGNOSIS — F51.01 PRIMARY INSOMNIA: ICD-10-CM

## 2020-08-07 RX ORDER — ZOLPIDEM TARTRATE 10 MG/1
TABLET ORAL
Qty: 30 TABLET | Refills: 2 | Status: SHIPPED | OUTPATIENT
Start: 2020-08-07 | End: 2021-10-21

## 2020-08-07 NOTE — TELEPHONE ENCOUNTER
Requested Prescriptions   Pending Prescriptions Disp Refills     zolpidem (AMBIEN) 10 MG tablet [Pharmacy Med Name: zolpidem 10 mg tablet] 30 tablet 2     Sig: TAKE 1 TABLET BY MOUTH NIGHTLY AS NEEDED FOR SLEEP       There is no refill protocol information for this order            Last Written Prescription Date:  04/20/2020  Last Fill Quantity: 30,   # refills: 2  Last Office Visit: 04/20/2020 with Eli Beltran NP   Future Office visit:  None noted.  Unable to complete prescription refill per RN medication refill policy. Will route to provider for review and consideration.  Vivi Ibarra RN on 8/7/2020 at 12:00 PM

## 2020-10-08 ENCOUNTER — OFFICE VISIT (OUTPATIENT)
Dept: FAMILY MEDICINE | Facility: OTHER | Age: 25
End: 2020-10-08
Attending: FAMILY MEDICINE
Payer: COMMERCIAL

## 2020-10-08 VITALS
HEART RATE: 91 BPM | OXYGEN SATURATION: 98 % | WEIGHT: 118.13 LBS | SYSTOLIC BLOOD PRESSURE: 104 MMHG | DIASTOLIC BLOOD PRESSURE: 58 MMHG | TEMPERATURE: 97.7 F | BODY MASS INDEX: 20.17 KG/M2 | RESPIRATION RATE: 18 BRPM | HEIGHT: 64 IN

## 2020-10-08 DIAGNOSIS — Z23 NEED FOR TDAP VACCINATION: ICD-10-CM

## 2020-10-08 DIAGNOSIS — F98.8 ATTENTION DEFICIT DISORDER, UNSPECIFIED HYPERACTIVITY PRESENCE: ICD-10-CM

## 2020-10-08 DIAGNOSIS — Z13.1 SCREENING FOR DIABETES MELLITUS: ICD-10-CM

## 2020-10-08 DIAGNOSIS — Z13.0 SCREENING FOR DEFICIENCY ANEMIA: ICD-10-CM

## 2020-10-08 DIAGNOSIS — Z13.29 SCREENING FOR THYROID DISORDER: ICD-10-CM

## 2020-10-08 DIAGNOSIS — F41.8 DEPRESSION WITH ANXIETY: ICD-10-CM

## 2020-10-08 DIAGNOSIS — G43.809 OTHER MIGRAINE WITHOUT STATUS MIGRAINOSUS, NOT INTRACTABLE: ICD-10-CM

## 2020-10-08 DIAGNOSIS — R25.2 MUSCLE CRAMPS: ICD-10-CM

## 2020-10-08 DIAGNOSIS — Z00.00 HEALTH CARE MAINTENANCE: Primary | ICD-10-CM

## 2020-10-08 LAB
ALBUMIN SERPL-MCNC: 5.1 G/DL (ref 3.5–5.7)
ALP SERPL-CCNC: 56 U/L (ref 34–104)
ALT SERPL W P-5'-P-CCNC: 12 U/L (ref 7–52)
ANION GAP SERPL CALCULATED.3IONS-SCNC: 7 MMOL/L (ref 3–14)
AST SERPL W P-5'-P-CCNC: 19 U/L (ref 13–39)
BASOPHILS # BLD AUTO: 0 10E9/L (ref 0–0.2)
BASOPHILS NFR BLD AUTO: 0.6 %
BILIRUB SERPL-MCNC: 1.2 MG/DL (ref 0.3–1)
BUN SERPL-MCNC: 16 MG/DL (ref 7–25)
CALCIUM SERPL-MCNC: 9.8 MG/DL (ref 8.6–10.3)
CHLORIDE SERPL-SCNC: 103 MMOL/L (ref 98–107)
CO2 SERPL-SCNC: 29 MMOL/L (ref 21–31)
CREAT SERPL-MCNC: 0.87 MG/DL (ref 0.6–1.2)
DIFFERENTIAL METHOD BLD: NORMAL
EOSINOPHIL # BLD AUTO: 0.2 10E9/L (ref 0–0.7)
EOSINOPHIL NFR BLD AUTO: 3 %
ERYTHROCYTE [DISTWIDTH] IN BLOOD BY AUTOMATED COUNT: 11.8 % (ref 10–15)
GFR SERPL CREATININE-BSD FRML MDRD: 81 ML/MIN/{1.73_M2}
GLUCOSE SERPL-MCNC: 90 MG/DL (ref 70–105)
HCT VFR BLD AUTO: 44.2 % (ref 35–47)
HGB BLD-MCNC: 14.9 G/DL (ref 11.7–15.7)
IMM GRANULOCYTES # BLD: 0 10E9/L (ref 0–0.4)
IMM GRANULOCYTES NFR BLD: 0.3 %
LYMPHOCYTES # BLD AUTO: 2.8 10E9/L (ref 0.8–5.3)
LYMPHOCYTES NFR BLD AUTO: 41.3 %
MAGNESIUM SERPL-MCNC: 2.2 MG/DL (ref 1.9–2.7)
MCH RBC QN AUTO: 30.9 PG (ref 26.5–33)
MCHC RBC AUTO-ENTMCNC: 33.7 G/DL (ref 31.5–36.5)
MCV RBC AUTO: 92 FL (ref 78–100)
MONOCYTES # BLD AUTO: 0.5 10E9/L (ref 0–1.3)
MONOCYTES NFR BLD AUTO: 6.8 %
NEUTROPHILS # BLD AUTO: 3.2 10E9/L (ref 1.6–8.3)
NEUTROPHILS NFR BLD AUTO: 48 %
PLATELET # BLD AUTO: 276 10E9/L (ref 150–450)
POTASSIUM SERPL-SCNC: 3.4 MMOL/L (ref 3.5–5.1)
PROT SERPL-MCNC: 7.7 G/DL (ref 6.4–8.9)
RBC # BLD AUTO: 4.82 10E12/L (ref 3.8–5.2)
SODIUM SERPL-SCNC: 139 MMOL/L (ref 134–144)
TSH SERPL DL<=0.05 MIU/L-ACNC: 2.27 IU/ML (ref 0.34–5.6)
WBC # BLD AUTO: 6.8 10E9/L (ref 4–11)

## 2020-10-08 PROCEDURE — 85025 COMPLETE CBC W/AUTO DIFF WBC: CPT | Mod: ZL | Performed by: FAMILY MEDICINE

## 2020-10-08 PROCEDURE — 90715 TDAP VACCINE 7 YRS/> IM: CPT | Performed by: FAMILY MEDICINE

## 2020-10-08 PROCEDURE — 83735 ASSAY OF MAGNESIUM: CPT | Mod: ZL | Performed by: FAMILY MEDICINE

## 2020-10-08 PROCEDURE — 84443 ASSAY THYROID STIM HORMONE: CPT | Mod: ZL | Performed by: FAMILY MEDICINE

## 2020-10-08 PROCEDURE — 80307 DRUG TEST PRSMV CHEM ANLYZR: CPT | Mod: ZL | Performed by: FAMILY MEDICINE

## 2020-10-08 PROCEDURE — 90471 IMMUNIZATION ADMIN: CPT | Performed by: FAMILY MEDICINE

## 2020-10-08 PROCEDURE — 80053 COMPREHEN METABOLIC PANEL: CPT | Mod: ZL | Performed by: FAMILY MEDICINE

## 2020-10-08 PROCEDURE — 36415 COLL VENOUS BLD VENIPUNCTURE: CPT | Mod: ZL

## 2020-10-08 PROCEDURE — 99395 PREV VISIT EST AGE 18-39: CPT | Mod: 25 | Performed by: FAMILY MEDICINE

## 2020-10-08 RX ORDER — DEXTROAMPHETAMINE SACCHARATE, AMPHETAMINE ASPARTATE, DEXTROAMPHETAMINE SULFATE AND AMPHETAMINE SULFATE 2.5; 2.5; 2.5; 2.5 MG/1; MG/1; MG/1; MG/1
TABLET ORAL
Qty: 30 TABLET | Refills: 0 | Status: SHIPPED | OUTPATIENT
Start: 2020-12-03 | End: 2021-10-21

## 2020-10-08 RX ORDER — DEXTROAMPHETAMINE SACCHARATE, AMPHETAMINE ASPARTATE MONOHYDRATE, DEXTROAMPHETAMINE SULFATE AND AMPHETAMINE SULFATE 6.25; 6.25; 6.25; 6.25 MG/1; MG/1; MG/1; MG/1
25 CAPSULE, EXTENDED RELEASE ORAL EVERY MORNING
Qty: 30 CAPSULE | Refills: 0 | Status: CANCELLED | OUTPATIENT
Start: 2020-10-08

## 2020-10-08 RX ORDER — DEXTROAMPHETAMINE SACCHARATE, AMPHETAMINE ASPARTATE MONOHYDRATE, DEXTROAMPHETAMINE SULFATE AND AMPHETAMINE SULFATE 6.25; 6.25; 6.25; 6.25 MG/1; MG/1; MG/1; MG/1
25 CAPSULE, EXTENDED RELEASE ORAL EVERY MORNING
Qty: 30 CAPSULE | Refills: 0 | Status: CANCELLED | OUTPATIENT
Start: 2020-11-05

## 2020-10-08 RX ORDER — CITALOPRAM HYDROBROMIDE 40 MG/1
40 TABLET ORAL DAILY
Qty: 90 TABLET | Refills: 3 | Status: SHIPPED | OUTPATIENT
Start: 2020-10-08 | End: 2021-10-21

## 2020-10-08 RX ORDER — DEXTROAMPHETAMINE SACCHARATE, AMPHETAMINE ASPARTATE, DEXTROAMPHETAMINE SULFATE AND AMPHETAMINE SULFATE 2.5; 2.5; 2.5; 2.5 MG/1; MG/1; MG/1; MG/1
TABLET ORAL
Qty: 30 TABLET | Refills: 0 | Status: SHIPPED | OUTPATIENT
Start: 2020-11-05 | End: 2021-10-21

## 2020-10-08 RX ORDER — DEXTROAMPHETAMINE SACCHARATE, AMPHETAMINE ASPARTATE, DEXTROAMPHETAMINE SULFATE AND AMPHETAMINE SULFATE 2.5; 2.5; 2.5; 2.5 MG/1; MG/1; MG/1; MG/1
TABLET ORAL
Qty: 30 TABLET | Refills: 0 | Status: SHIPPED | OUTPATIENT
Start: 2020-10-08 | End: 2021-10-21

## 2020-10-08 RX ORDER — DEXTROAMPHETAMINE SACCHARATE, AMPHETAMINE ASPARTATE MONOHYDRATE, DEXTROAMPHETAMINE SULFATE AND AMPHETAMINE SULFATE 6.25; 6.25; 6.25; 6.25 MG/1; MG/1; MG/1; MG/1
25 CAPSULE, EXTENDED RELEASE ORAL EVERY MORNING
Qty: 30 CAPSULE | Refills: 0 | Status: CANCELLED | OUTPATIENT
Start: 2020-12-03

## 2020-10-08 RX ORDER — PROPRANOLOL HCL 60 MG
CAPSULE, EXTENDED RELEASE 24HR ORAL
Qty: 90 CAPSULE | Refills: 3 | Status: SHIPPED | OUTPATIENT
Start: 2020-10-08 | End: 2021-10-21

## 2020-10-08 ASSESSMENT — ENCOUNTER SYMPTOMS
SHORTNESS OF BREATH: 0
NERVOUS/ANXIOUS: 0
COUGH: 0
FEVER: 0
CHILLS: 0

## 2020-10-08 ASSESSMENT — ANXIETY QUESTIONNAIRES
5. BEING SO RESTLESS THAT IT IS HARD TO SIT STILL: MORE THAN HALF THE DAYS
7. FEELING AFRAID AS IF SOMETHING AWFUL MIGHT HAPPEN: NOT AT ALL
1. FEELING NERVOUS, ANXIOUS, OR ON EDGE: MORE THAN HALF THE DAYS
2. NOT BEING ABLE TO STOP OR CONTROL WORRYING: MORE THAN HALF THE DAYS
3. WORRYING TOO MUCH ABOUT DIFFERENT THINGS: MORE THAN HALF THE DAYS
6. BECOMING EASILY ANNOYED OR IRRITABLE: SEVERAL DAYS
GAD7 TOTAL SCORE: 11
IF YOU CHECKED OFF ANY PROBLEMS ON THIS QUESTIONNAIRE, HOW DIFFICULT HAVE THESE PROBLEMS MADE IT FOR YOU TO DO YOUR WORK, TAKE CARE OF THINGS AT HOME, OR GET ALONG WITH OTHER PEOPLE: NOT DIFFICULT AT ALL

## 2020-10-08 ASSESSMENT — PAIN SCALES - GENERAL: PAINLEVEL: NO PAIN (0)

## 2020-10-08 ASSESSMENT — PATIENT HEALTH QUESTIONNAIRE - PHQ9
SUM OF ALL RESPONSES TO PHQ QUESTIONS 1-9: 5
5. POOR APPETITE OR OVEREATING: MORE THAN HALF THE DAYS

## 2020-10-08 ASSESSMENT — MIFFLIN-ST. JEOR: SCORE: 1275.81

## 2020-10-08 NOTE — PROGRESS NOTES
SUBJECTIVE:   Nursing Notes:   Marce Pandey LPN  10/8/2020  8:20 AM  Sign at exiting of workspace  Patient presents to clinic for physical exam.  Medication Reconciliation: complete    Marce Pandey LPN        George Mcknight is a 25 year old female who presents to clinic today for a physical.  She had a Pap Smear last year in [place], which was normal.     She is taking the adderall intermittently for her ADHD.  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  She feels better able to focus without the adderall all the time.  She is only taking it intermittently. She feels that she would be able to stop the Adderall XR and only use the immediate release at this time.  Since she has started working, she feels that her anxiety has been worse.  Hard to shut her mind off after work.  Always is worried that she forgot to do something.     She has had a lot of muscle cramps and would like her magnesium and potassium checked today.    HPI    I personally reviewed medications/allergies/history listed below:    Patient Active Problem List    Diagnosis Date Noted     Raynaud's disease without gangrene 03/22/2019     Priority: Medium     Exercise-induced asthma 09/14/2018     Priority: Medium     ADD (attention deficit disorder) 03/25/2017     Priority: Medium     Depression with anxiety 03/25/2017     Priority: Medium     Insomnia 03/25/2017     Priority: Medium     Migraine without status migrainosus, not intractable 03/25/2017     Priority: Medium     Controlled substance agreement 10/08/20 03/24/2017     Priority: Medium     Overview:   Adderall XR 10 mg #30 tablets of each per month       Past Medical History:   Diagnosis Date     Convergence insufficiency     2012,as a result of TBI - had to wear prism glasses for about 1 year.     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     Migraine without status migrainosus, not intractable     No Comments Provided      Other specified anxiety disorders     No Comments Provided     Other specified behavioral and emotional disorders with onset usually occurring in childhood and adolescence     No Comments Provided      History reviewed. No pertinent surgical history.  History reviewed. No pertinent family history.  Social History     Tobacco Use     Smoking status: Never Smoker     Smokeless tobacco: Never Used   Substance Use Topics     Alcohol use: Yes     Comment: Alcoholic Drinks/day: a few times a year     Social History     Social History Narrative    Student at [employer].  Studying toward her BSN.  Family moved from [place], [place] fall 2016.  Both parents grew up in the [place] [facility] [place].  Mom Nelly Cohen is RN, Clinic Nurse Manager at [employer].  Dad - Armando  Sister - Marianna is  and works as an RN at [employer] in [place].     Current Outpatient Medications   Medication Sig Dispense Refill     albuterol (PROAIR HFA/PROVENTIL HFA/VENTOLIN HFA) 108 (90 Base) MCG/ACT inhaler Inhale 2 puffs into the lungs every 4 hours as needed for shortness of breath / dyspnea or wheezing 1 Inhaler 11     [START ON 12/3/2020] amphetamine-dextroamphetamine (ADDERALL) 10 MG tablet Do not start before December 3, 2020. Take 1 tablet by mouth in the evening as needed 30 tablet 0     [START ON 11/5/2020] amphetamine-dextroamphetamine (ADDERALL) 10 MG tablet Do not start before November 5, 2020. Take 1 tablet by mouth in the evening as needed 30 tablet 0     amphetamine-dextroamphetamine (ADDERALL) 10 MG tablet Take 1 tablet by mouth in the evening as needed 30 tablet 0     Cholecalciferol (VITAMIN D3) 1000 UNITS CAPS Take 1,000 Units by mouth       citalopram (CELEXA) 40 MG tablet Take 1 tablet (40 mg) by mouth daily 90 tablet 3     NIFEdipine ER OSMOTIC (PROCARDIA XL) 30 MG 24 hr tablet TAKE 1 TABLET BY MOUTH ONCE DAILY 90 tablet 3     propranolol ER (INDERAL LA) 60 MG 24 hr capsule TAKE 1 CAPSULE BY MOUTH ONCE DAILY 90 capsule 3     pseudoePHEDrine  "(SUDAFED) 60 MG tablet Take 60 mg by mouth       SUMAtriptan (IMITREX) 50 MG tablet Take 1 tablet (50 mg) by mouth at onset of headache for migraine May repeat after 2 hours as needed. Max dose 200 mg per 24 hours. 9 tablet 11     zolpidem (AMBIEN) 10 MG tablet TAKE 1 TABLET BY MOUTH NIGHTLY AS NEEDED FOR SLEEP 30 tablet 2     Allergies   Allergen Reactions     Cats Other (See Comments)     Swelling of face/eyes/throat     No Clinical Screening - See Comments Other (See Comments)     Trees - itchy  Swelling of face/eyes/throat  Trees - itchy     Seasonal Allergies Other (See Comments)     Trees - itchy     Trichophyton Other (See Comments)     Swelling of face/eyes/throat       Review of Systems   Constitutional: Negative for chills and fever.   Respiratory: Negative for cough and shortness of breath.    Cardiovascular: Negative for peripheral edema.   Psychiatric/Behavioral: Negative for mood changes. The patient is not nervous/anxious.         OBJECTIVE:     /58 (BP Location: Right arm, Patient Position: Sitting, Cuff Size: Adult Regular)   Pulse 91   Temp 97.7  F (36.5  C) (Tympanic)   Resp 18   Ht 1.626 m (5' 4\")   Wt 53.6 kg (118 lb 2 oz)   LMP 10/02/2020 (Exact Date)   SpO2 98%   Breastfeeding No   BMI 20.28 kg/m    Body mass index is 20.28 kg/m .  Physical Exam  Constitutional:       Appearance: She is well-developed.   HENT:      Head: Normocephalic.      Right Ear: External ear normal.      Left Ear: External ear normal.      Nose: Nose normal.   Eyes:      Pupils: Pupils are equal, round, and reactive to light.   Neck:      Musculoskeletal: Normal range of motion and neck supple.      Thyroid: No thyromegaly.   Cardiovascular:      Rate and Rhythm: Normal rate and regular rhythm.      Heart sounds: Normal heart sounds. No murmur.   Pulmonary:      Effort: Pulmonary effort is normal. No respiratory distress.      Breath sounds: Normal breath sounds. No wheezing or rales.   Chest:      Chest " wall: No tenderness.   Abdominal:      General: Bowel sounds are normal. There is no distension.      Palpations: Abdomen is soft. There is no mass.      Tenderness: There is no abdominal tenderness. There is no guarding or rebound.   Musculoskeletal:         General: No tenderness or deformity.   Lymphadenopathy:      Cervical: No cervical adenopathy.   Skin:     General: Skin is warm and dry.   Neurological:      Mental Status: She is alert and oriented to person, place, and time.      Cranial Nerves: No cranial nerve deficit.      Coordination: Coordination normal.           PHQ-9 SCORE 12/20/2018 3/22/2019 10/8/2020   PHQ-9 Total Score 4 5 5       PHQ-2 Score:     PHQ-2 ( 1999 Pfizer) 10/8/2020 7/15/2020   Q1: Little interest or pleasure in doing things 0 -   Q2: Feeling down, depressed or hopeless 0 0   PHQ-2 Score 0 -       HECTOR-7 SCORE 12/20/2018 3/22/2019 10/8/2020   Total Score 6 2 11         ACT Total Scores 9/14/2018   ACT TOTAL SCORE (Goal Greater than or Equal to 20) 25   In the past 12 months, how many times did you visit the emergency room for your asthma without being admitted to the hospital? 0   In the past 12 months, how many times were you hospitalized overnight because of your asthma? 0         I personally reviewed results withpatient as listed below:   Diagnostic Test Results:  none     ASSESSMENT/PLAN:       ICD-10-CM    1. Health care maintenance  Z00.00    2. Need for Tdap vaccination  Z23 GH IMM-  TDAP VACCINE (BOOSTRIX )   3. Attention deficit disorder, unspecified hyperactivity presence  F98.8 Drug  Screen Comprehensive , Urine with Reported Meds (MedTox) (Pain Care Package)     amphetamine-dextroamphetamine (ADDERALL) 10 MG tablet     amphetamine-dextroamphetamine (ADDERALL) 10 MG tablet     amphetamine-dextroamphetamine (ADDERALL) 10 MG tablet   4. Depression with anxiety  F41.8 citalopram (CELEXA) 40 MG tablet   5. Muscle cramps  R25.2 Comprehensive metabolic panel     Magnesium      Magnesium     Comprehensive metabolic panel   6. Other migraine without status migrainosus, not intractable  G43.809 propranolol ER (INDERAL LA) 60 MG 24 hr capsule   7. Screening for thyroid disorder  Z13.29 TSH Reflex GH     TSH Reflex GH   8. Screening for diabetes mellitus  Z13.1 Comprehensive metabolic panel     Comprehensive metabolic panel   9. Screening for deficiency anemia  Z13.0 CBC with platelets differential     CBC with platelets differential       1.  Pap Smear is up to date.   2.  tdap updated today as above.  3.   reviewed.  Contract and drug screen updated today.  Adderall 10 mg #30x3 refilled today.    4.  She wishes to try an increase in her celexa to 40 mg daily.  Refill sent to her pharmacy.  5.  Labs as above.  6.  Propranolol ER sent to pharmacy.  7.  TSH as above.  8.  Comprehensive Metabolic Profile as above.  9.  Complete Blood Count as above.    Cyn Avendaño MD  [facility]    Portions of this dictation were created using the Dragon Nuance voice recognition system. Proofreading was completed but there may be errors in text.

## 2020-10-08 NOTE — NURSING NOTE
Patient presents to clinic for physical exam.  Medication Reconciliation: complete    Marce Pandey, QUINTONN

## 2020-10-09 ASSESSMENT — ANXIETY QUESTIONNAIRES: GAD7 TOTAL SCORE: 11

## 2020-10-13 LAB — PAIN DRUG SCR UR W RPTD MEDS: NORMAL

## 2021-01-14 PROCEDURE — 99283 EMERGENCY DEPT VISIT LOW MDM: CPT | Performed by: EMERGENCY MEDICINE

## 2021-01-14 PROCEDURE — 96374 THER/PROPH/DIAG INJ IV PUSH: CPT | Performed by: EMERGENCY MEDICINE

## 2021-01-14 PROCEDURE — 99284 EMERGENCY DEPT VISIT MOD MDM: CPT | Performed by: EMERGENCY MEDICINE

## 2021-01-14 PROCEDURE — 96375 TX/PRO/DX INJ NEW DRUG ADDON: CPT | Performed by: EMERGENCY MEDICINE

## 2021-01-15 ENCOUNTER — HOSPITAL ENCOUNTER (EMERGENCY)
Facility: OTHER | Age: 26
Discharge: HOME | End: 2021-01-15
Attending: EMERGENCY MEDICINE | Admitting: EMERGENCY MEDICINE
Payer: COMMERCIAL

## 2021-01-15 DIAGNOSIS — R51.9 NONINTRACTABLE HEADACHE, UNSPECIFIED CHRONICITY PATTERN, UNSPECIFIED HEADACHE TYPE: ICD-10-CM

## 2021-01-15 PROCEDURE — 96374 THER/PROPH/DIAG INJ IV PUSH: CPT | Performed by: EMERGENCY MEDICINE

## 2021-01-15 PROCEDURE — 96375 TX/PRO/DX INJ NEW DRUG ADDON: CPT | Performed by: EMERGENCY MEDICINE

## 2021-01-15 PROCEDURE — 258N000003 HC RX IP 258 OP 636: Performed by: EMERGENCY MEDICINE

## 2021-01-15 PROCEDURE — 250N000011 HC RX IP 250 OP 636: Performed by: EMERGENCY MEDICINE

## 2021-01-15 RX ORDER — DIPHENHYDRAMINE HYDROCHLORIDE 50 MG/ML
25 INJECTION INTRAMUSCULAR; INTRAVENOUS ONCE
Status: COMPLETED | OUTPATIENT
Start: 2021-01-15 | End: 2021-01-15

## 2021-01-15 RX ORDER — KETOROLAC TROMETHAMINE 30 MG/ML
30 INJECTION, SOLUTION INTRAMUSCULAR; INTRAVENOUS ONCE
Status: COMPLETED | OUTPATIENT
Start: 2021-01-15 | End: 2021-01-15

## 2021-01-15 RX ORDER — SODIUM CHLORIDE 9 MG/ML
INJECTION, SOLUTION INTRAVENOUS CONTINUOUS
Status: DISCONTINUED | OUTPATIENT
Start: 2021-01-15 | End: 2021-01-15 | Stop reason: HOSPADM

## 2021-01-15 RX ADMIN — KETOROLAC TROMETHAMINE 30 MG: 30 INJECTION, SOLUTION INTRAMUSCULAR at 00:31

## 2021-01-15 RX ADMIN — PROCHLORPERAZINE EDISYLATE 10 MG: 5 INJECTION INTRAMUSCULAR; INTRAVENOUS at 00:31

## 2021-01-15 RX ADMIN — DIPHENHYDRAMINE HYDROCHLORIDE 25 MG: 50 INJECTION, SOLUTION INTRAMUSCULAR; INTRAVENOUS at 00:31

## 2021-01-15 RX ADMIN — SODIUM CHLORIDE 1000 ML: 9 INJECTION, SOLUTION INTRAVENOUS at 00:32

## 2021-01-15 ASSESSMENT — ENCOUNTER SYMPTOMS
FEVER: 0
VOMITING: 0
CONFUSION: 0
NAUSEA: 1
CHEST TIGHTNESS: 0
SHORTNESS OF BREATH: 0
DYSURIA: 0
ARTHRALGIAS: 0
HEADACHES: 0
CHILLS: 0

## 2021-01-15 NOTE — ED AVS SNAPSHOT
[facility]  [address]  [place] [address]  Phone: [phone]  Fax: [phone]                                    George Mcknight   MRN: [id number]    Department: [facility]   Date of Visit: 1/14/2021           After Visit Summary Signature Page    I have received my discharge instructions, and my questions have been answered. I have discussed any challenges I see with this plan with the nurse or doctor.    ..........................................................................................................................................  Patient/Patient Representative Signature      ..........................................................................................................................................  Patient Representative Print Name and Relationship to Patient    ..................................................               ................................................  Date                                   Time    ..........................................................................................................................................  Reviewed by Signature/Title    ...................................................              ..............................................  Date                                               Time          22EPIC Rev 08/18

## 2021-01-15 NOTE — LETTER
[facility]  [address]  [place] [address]  Phone: [phone]  Fax: [phone]    January 15, 2021        George Mcknight  [id number]  [place] [address]          To whom it may concern:    RE: George Mcknight should have the rest of her shift off from work.    Please contact me for questions or concerns.      Sincerely,        Zak Castrejon MD  1/15/2021, 1:37 AM

## 2021-01-15 NOTE — ED PROVIDER NOTES
History     Chief Complaint   Patient presents with     Headache     HPI  George Mcknight is a 25 year old female who is here with a migraine.  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  She states this headache feels similar to previous except that it is much worse than normal.  She came here and lying down she is not feeling nearly as lightheaded but still has very severe pain.  She is hyperventilating and gripping the side of the bed very tightly and holding her breath at times as well.  Seems quite anxious.  Not feeling ill otherwise.  Has had headaches similar to this in the past but not this bad for quite some time.    Allergies:  Allergies   Allergen Reactions     Cats Other (See Comments)     Swelling of face/eyes/throat     No Clinical Screening - See Comments Other (See Comments)     Trees - itchy  Swelling of face/eyes/throat  Trees - itchy     Seasonal Allergies Other (See Comments)     Trees - itchy     Trichophyton Other (See Comments)     Swelling of face/eyes/throat       Problem List:    Patient Active Problem List    Diagnosis Date Noted     Raynaud's disease without gangrene 03/22/2019     Priority: Medium     Exercise-induced asthma 09/14/2018     Priority: Medium     ADD (attention deficit disorder) 03/25/2017     Priority: Medium     Depression with anxiety 03/25/2017     Priority: Medium     Insomnia 03/25/2017     Priority: Medium     Migraine without status migrainosus, not intractable 03/25/2017     Priority: Medium     Controlled substance agreement 10/08/20 03/24/2017     Priority: Medium     Overview:   Adderall XR 10 mg #30 tablets of each per month          Past Medical History:    Past Medical History:   Diagnosis Date     Convergence insufficiency      Intracranial injury with loss of consciousness (H)      Migraine without status migrainosus, not  intractable      Other specified anxiety disorders      Other specified behavioral and emotional disorders with onset usually occurring in childhood and adolescence        Past Surgical History:    History reviewed. No pertinent surgical history.    Family History:    History reviewed. No pertinent family history.    Social History:  Marital Status:  Single [1]  Social History     Tobacco Use     Smoking status: Never Smoker     Smokeless tobacco: Never Used   Substance Use Topics     Alcohol use: Yes     Comment: Alcoholic Drinks/day: a few times a year     Drug use: No     Comment: Drug use: No        Medications:         albuterol (PROAIR HFA/PROVENTIL HFA/VENTOLIN HFA) 108 (90 Base) MCG/ACT inhaler       amphetamine-dextroamphetamine (ADDERALL) 10 MG tablet       amphetamine-dextroamphetamine (ADDERALL) 10 MG tablet       amphetamine-dextroamphetamine (ADDERALL) 10 MG tablet       Cholecalciferol (VITAMIN D3) 1000 UNITS CAPS       citalopram (CELEXA) 40 MG tablet       NIFEdipine ER OSMOTIC (PROCARDIA XL) 30 MG 24 hr tablet       propranolol ER (INDERAL LA) 60 MG 24 hr capsule       pseudoePHEDrine (SUDAFED) 60 MG tablet       SUMAtriptan (IMITREX) 50 MG tablet       zolpidem (AMBIEN) 10 MG tablet          Review of Systems   Constitutional: Negative for chills and fever.   HENT: Negative for congestion.    Eyes: Negative for visual disturbance.   Respiratory: Negative for chest tightness and shortness of breath.    Cardiovascular: Negative for chest pain.   Gastrointestinal: Positive for nausea. Negative for vomiting.   Genitourinary: Negative for dysuria.   Musculoskeletal: Negative for arthralgias.   Skin: Negative for rash.   Neurological: Negative for headaches.   Psychiatric/Behavioral: Negative for confusion.       Physical Exam          Physical Exam  Vitals signs and nursing note reviewed.   Constitutional:       Appearance: Normal appearance.   HENT:      Head: Normocephalic and atraumatic.       Mouth/Throat:      Mouth: Mucous membranes are moist.   Eyes:      Conjunctiva/sclera: Conjunctivae normal.   Cardiovascular:      Rate and Rhythm: Normal rate and regular rhythm.      Heart sounds: Normal heart sounds.   Pulmonary:      Effort: Pulmonary effort is normal.      Breath sounds: Normal breath sounds.   Abdominal:      General: Abdomen is flat.   Skin:     General: Skin is warm and dry.   Neurological:      Mental Status: She is alert and oriented to person, place, and time.   Psychiatric:         Mood and Affect: Mood normal.         Behavior: Behavior normal.         ED Course        Procedures               No results found for this or any previous visit (from the past 24 hour(s)).    Medications   0.9% sodium chloride BOLUS (1,000 mLs Intravenous New Bag 1/15/21 0032)     Followed by   sodium chloride 0.9% infusion (has no administration in time range)   ketorolac (TORADOL) injection 30 mg (30 mg Intravenous Given 1/15/21 0031)   prochlorperazine (COMPAZINE) injection 10 mg (10 mg Intravenous Given 1/15/21 0031)   diphenhydrAMINE (BENADRYL) injection 25 mg (25 mg Intravenous Given 1/15/21 0031)       Assessments & Plan (with Medical Decision Making)     I have reviewed the nursing notes.    I have reviewed the findings, diagnosis, plan and need for follow up with the patient.  Feeling better with above interventions.  She will be discharged home at this time.    New Prescriptions    No medications on file       Final diagnoses:   Nonintractable headache, unspecified chronicity pattern, unspecified headache type       1/14/2021   [facility]     Zak Castrejon MD  01/15/21 0138

## 2021-01-15 NOTE — ED TRIAGE NOTES
Pt presents to ED in w/c c/o migraine HA and stiff neck. She states she has had a TBI and gets migraines. Stated this began with tunnel vision and almost passing out.

## 2021-01-21 ENCOUNTER — PATIENT OUTREACH (OUTPATIENT)
Dept: FAMILY MEDICINE | Facility: OTHER | Age: 26
End: 2021-01-21

## 2021-01-21 NOTE — TELEPHONE ENCOUNTER
Patient Quality Outreach      Summary:    Patient has the following on her problem list/HM:   Asthma review       ACT Total Scores 9/14/2018   ACT TOTAL SCORE (Goal Greater than or Equal to 20) 25   In the past 12 months, how many times did you visit the emergency room for your asthma without being admitted to the hospital? 0   In the past 12 months, how many times were you hospitalized overnight because of your asthma? 0          Patient is due/failing the following:   ACT needed    Type of outreach:    Sent [facility] message.    Questions for provider review:    None                                                                                                                                     Sol Soler LPN .......  1/21/2021  4:43 PM

## 2021-06-22 ENCOUNTER — HOSPITAL ENCOUNTER (OUTPATIENT)
Dept: GENERAL RADIOLOGY | Facility: OTHER | Age: 26
End: 2021-06-22
Attending: FAMILY MEDICINE
Payer: COMMERCIAL

## 2021-06-22 ENCOUNTER — OFFICE VISIT (OUTPATIENT)
Dept: FAMILY MEDICINE | Facility: OTHER | Age: 26
End: 2021-06-22
Attending: FAMILY MEDICINE
Payer: COMMERCIAL

## 2021-06-22 VITALS
SYSTOLIC BLOOD PRESSURE: 108 MMHG | HEIGHT: 64 IN | OXYGEN SATURATION: 100 % | RESPIRATION RATE: 16 BRPM | BODY MASS INDEX: 20.86 KG/M2 | HEART RATE: 56 BPM | WEIGHT: 122.2 LBS | DIASTOLIC BLOOD PRESSURE: 68 MMHG

## 2021-06-22 DIAGNOSIS — M25.561 ACUTE PAIN OF RIGHT KNEE: ICD-10-CM

## 2021-06-22 DIAGNOSIS — M25.561 ACUTE PAIN OF RIGHT KNEE: Primary | ICD-10-CM

## 2021-06-22 PROCEDURE — 99213 OFFICE O/P EST LOW 20 MIN: CPT | Performed by: FAMILY MEDICINE

## 2021-06-22 PROCEDURE — 73562 X-RAY EXAM OF KNEE 3: CPT | Mod: RT

## 2021-06-22 ASSESSMENT — PAIN SCALES - GENERAL: PAINLEVEL: MILD PAIN (3)

## 2021-06-22 ASSESSMENT — MIFFLIN-ST. JEOR: SCORE: 1294.3

## 2021-06-22 NOTE — PROGRESS NOTES
"SUBJECTIVE:  George Mcknight is a 25 year old female here for right knee pain.  She reports that she injured her right knee on [address] when she was waterskiing.  She tried to drop a ski when her foot caught the water and she had a valgus type of strain.  She did not have immediate pain however as the day and night went on her pain worsened.  Since that time she has had decreased range of motion due to pain as well as pain when she puts weight on her foot.  She has had no previous injuries to her right knee.  She has been using Tylenol and ibuprofen, ice, compression and elevation.    ROS:    As above otherwise ROS is unremarkable.    OBJECTIVE:  /68   Pulse 56   Resp 16   Ht 1.626 m (5' 4\")   Wt 55.4 kg (122 lb 3.2 oz)   SpO2 100%   BMI 20.98 kg/m      EXAM:  General Appearance: Pleasant, alert, appropriate appearance for age. No acute distress  Musculoskeletal: Right knee shows that she is able to fully extend her knee although she does have some medial tenderness with that.  Flexion to 90 degrees is normal.  She has tenderness to palpation along her medial collateral ligament and pain with valgus stressing.  She also has medial pain with Jesús's.  Varus, anterior drawer and Lachman's are normal.  Skin: No anterior or posterior knee swelling.  No bruising or warmth.    ASSESSEMENT AND PLAN:    1. Acute pain of right knee      Given her mechanism of injury x-rays were performed, personally reviewed and shows bony abnormality or avulsion fracture.  Suspect that she has an MCL sprain and potentially a medial meniscal injury.  At this time we will place her in a runners brace, activity as tolerated, anti-inflammatories and ice.  If symptoms or not resolved in the next 4 weeks and certainly if she is worsening we could consider an MRI to evaluate for internal pathology.      Javier Caicedo MD  Family Medicine  "

## 2021-06-22 NOTE — NURSING NOTE
Patient presents today for injury to her right knee while water skiing on 06/10/21.    Medication Reconciliation Complete    Rosario Tierney LPN  6/22/2021 2:54 PM

## 2021-07-09 DIAGNOSIS — G43.809 OTHER MIGRAINE, NOT INTRACTABLE, WITHOUT STATUS MIGRAINOSUS: ICD-10-CM

## 2021-07-09 DIAGNOSIS — I73.00 RAYNAUD'S DISEASE WITHOUT GANGRENE: ICD-10-CM

## 2021-07-12 RX ORDER — SUMATRIPTAN 50 MG/1
50 TABLET, FILM COATED ORAL
Qty: 9 TABLET | Refills: 1 | Status: SHIPPED | OUTPATIENT
Start: 2021-07-12 | End: 2021-10-21

## 2021-07-12 RX ORDER — NIFEDIPINE 30 MG/1
TABLET, EXTENDED RELEASE ORAL
Qty: 90 TABLET | Refills: 1 | Status: SHIPPED | OUTPATIENT
Start: 2021-07-12 | End: 2021-10-21

## 2021-07-12 NOTE — TELEPHONE ENCOUNTER
Prescription approved per [facility] Refill Protocol.  Oksana Craig RN ....................  7/12/2021   9:54 AM

## 2021-07-12 NOTE — TELEPHONE ENCOUNTER
"[facility] sent Rx request for the following:     Requested Prescriptions   Pending Prescriptions Disp Refills     SUMAtriptan (IMITREX) 50 MG tablet [Pharmacy Med Name: sumatriptan 50 mg tablet] 9 tablet 11     Sig: Take 1 tablet (50 mg) by mouth at onset of headache for migraine May repeat after 2 hours as needed. Max dose 200 mg per 24 hours.        Last Prescription Date:   4/9/2020  Last Fill Qty/Refills:         9, R-11    Last Office Visit:             10/8/2020 health maintenance.    Future Office visit:          8/23/2021     Serotonin Agonists Failed - 7/9/2021 12:04 PM        Failed - Serotonin Agonist request needs review.     Please review patient's record. If patient has had 8 or more treatments in the past month, please forward to provider.          Passed - Blood pressure under 140/90 in past 12 months     BP Readings from Last 3 Encounters:   06/22/21 108/68   10/08/20 104/58   10/16/19 108/80                 Passed - Recent (12 mo) or future (30 days) visit within the authorizing provider's specialty     Patient has had an office visit with the authorizing provider or a provider within the authorizing providers department within the previous 12 mos or has a future within next 30 days. See \"Patient Info\" tab in inbasket, or \"Choose Columns\" in Meds & Orders section of the refill encounter.              Passed - Medication is active on med list        Passed - Patient is age 18 or older        Passed - No active pregnancy on record        Passed - No positive pregnancy test in past 12 months           Prescription approved per [facility] Refill Protocol.  Cristina Cronin RN ,....................  7/12/2021   4:08 PM    "

## 2021-10-09 ENCOUNTER — HEALTH MAINTENANCE LETTER (OUTPATIENT)
Age: 26
End: 2021-10-09

## 2021-10-21 ENCOUNTER — OFFICE VISIT (OUTPATIENT)
Dept: FAMILY MEDICINE | Facility: OTHER | Age: 26
End: 2021-10-21
Attending: FAMILY MEDICINE
Payer: COMMERCIAL

## 2021-10-21 VITALS
OXYGEN SATURATION: 100 % | RESPIRATION RATE: 16 BRPM | DIASTOLIC BLOOD PRESSURE: 76 MMHG | HEART RATE: 111 BPM | TEMPERATURE: 98.3 F | HEIGHT: 64 IN | BODY MASS INDEX: 21.1 KG/M2 | WEIGHT: 123.6 LBS | SYSTOLIC BLOOD PRESSURE: 112 MMHG

## 2021-10-21 DIAGNOSIS — G43.809 OTHER MIGRAINE WITHOUT STATUS MIGRAINOSUS, NOT INTRACTABLE: ICD-10-CM

## 2021-10-21 DIAGNOSIS — Z00.00 HEALTH CARE MAINTENANCE: Primary | ICD-10-CM

## 2021-10-21 DIAGNOSIS — I73.00 RAYNAUD'S DISEASE WITHOUT GANGRENE: ICD-10-CM

## 2021-10-21 DIAGNOSIS — F51.01 PRIMARY INSOMNIA: ICD-10-CM

## 2021-10-21 DIAGNOSIS — F41.8 DEPRESSION WITH ANXIETY: ICD-10-CM

## 2021-10-21 DIAGNOSIS — F98.8 ATTENTION DEFICIT DISORDER, UNSPECIFIED HYPERACTIVITY PRESENCE: ICD-10-CM

## 2021-10-21 DIAGNOSIS — G43.809 OTHER MIGRAINE, NOT INTRACTABLE, WITHOUT STATUS MIGRAINOSUS: ICD-10-CM

## 2021-10-21 LAB — CREAT UR-MCNC: 139 MG/DL

## 2021-10-21 PROCEDURE — 82570 ASSAY OF URINE CREATININE: CPT | Mod: ZL | Performed by: FAMILY MEDICINE

## 2021-10-21 PROCEDURE — 80307 DRUG TEST PRSMV CHEM ANLYZR: CPT | Mod: ZL | Performed by: FAMILY MEDICINE

## 2021-10-21 PROCEDURE — 99395 PREV VISIT EST AGE 18-39: CPT | Performed by: FAMILY MEDICINE

## 2021-10-21 RX ORDER — CITALOPRAM HYDROBROMIDE 40 MG/1
40 TABLET ORAL DAILY
Qty: 90 TABLET | Refills: 3 | Status: SHIPPED | OUTPATIENT
Start: 2021-10-21 | End: 2022-06-24

## 2021-10-21 RX ORDER — DEXTROAMPHETAMINE SACCHARATE, AMPHETAMINE ASPARTATE, DEXTROAMPHETAMINE SULFATE AND AMPHETAMINE SULFATE 2.5; 2.5; 2.5; 2.5 MG/1; MG/1; MG/1; MG/1
TABLET ORAL
Qty: 30 TABLET | Refills: 0 | Status: SHIPPED | OUTPATIENT
Start: 2021-10-21 | End: 2022-06-24

## 2021-10-21 RX ORDER — PROPRANOLOL HCL 60 MG
CAPSULE, EXTENDED RELEASE 24HR ORAL
Qty: 90 CAPSULE | Refills: 3 | Status: SHIPPED | OUTPATIENT
Start: 2021-10-21 | End: 2022-11-22

## 2021-10-21 RX ORDER — NIFEDIPINE 30 MG/1
30 TABLET, EXTENDED RELEASE ORAL DAILY
Qty: 90 TABLET | Refills: 3 | Status: SHIPPED | OUTPATIENT
Start: 2021-10-21 | End: 2022-11-22

## 2021-10-21 RX ORDER — SUMATRIPTAN 50 MG/1
50 TABLET, FILM COATED ORAL
Qty: 9 TABLET | Refills: 11 | Status: SHIPPED | OUTPATIENT
Start: 2021-10-21 | End: 2022-11-22

## 2021-10-21 RX ORDER — ZOLPIDEM TARTRATE 10 MG/1
10 TABLET ORAL
Qty: 30 TABLET | Refills: 5 | Status: SHIPPED | OUTPATIENT
Start: 2021-10-21 | End: 2022-11-22

## 2021-10-21 ASSESSMENT — ANXIETY QUESTIONNAIRES
6. BECOMING EASILY ANNOYED OR IRRITABLE: SEVERAL DAYS
2. NOT BEING ABLE TO STOP OR CONTROL WORRYING: SEVERAL DAYS
4. TROUBLE RELAXING: SEVERAL DAYS
7. FEELING AFRAID AS IF SOMETHING AWFUL MIGHT HAPPEN: SEVERAL DAYS
GAD7 TOTAL SCORE: 8
8. IF YOU CHECKED OFF ANY PROBLEMS, HOW DIFFICULT HAVE THESE MADE IT FOR YOU TO DO YOUR WORK, TAKE CARE OF THINGS AT HOME, OR GET ALONG WITH OTHER PEOPLE?: SOMEWHAT DIFFICULT
3. WORRYING TOO MUCH ABOUT DIFFERENT THINGS: SEVERAL DAYS
GAD7 TOTAL SCORE: 8
5. BEING SO RESTLESS THAT IT IS HARD TO SIT STILL: SEVERAL DAYS
GAD7 TOTAL SCORE: 8
7. FEELING AFRAID AS IF SOMETHING AWFUL MIGHT HAPPEN: SEVERAL DAYS
1. FEELING NERVOUS, ANXIOUS, OR ON EDGE: MORE THAN HALF THE DAYS

## 2021-10-21 ASSESSMENT — ENCOUNTER SYMPTOMS
SHORTNESS OF BREATH: 0
CHILLS: 0
FEVER: 0
NERVOUS/ANXIOUS: 0
COUGH: 0

## 2021-10-21 ASSESSMENT — ASTHMA QUESTIONNAIRES
ACT_TOTALSCORE: 25
QUESTION_5 LAST FOUR WEEKS HOW WOULD YOU RATE YOUR ASTHMA CONTROL: COMPLETELY CONTROLLED
QUESTION_1 LAST FOUR WEEKS HOW MUCH OF THE TIME DID YOUR ASTHMA KEEP YOU FROM GETTING AS MUCH DONE AT WORK, SCHOOL OR AT HOME: NONE OF THE TIME
QUESTION_4 LAST FOUR WEEKS HOW OFTEN HAVE YOU USED YOUR RESCUE INHALER OR NEBULIZER MEDICATION (SUCH AS ALBUTEROL): NOT AT ALL
QUESTION_2 LAST FOUR WEEKS HOW OFTEN HAVE YOU HAD SHORTNESS OF BREATH: NOT AT ALL
QUESTION_3 LAST FOUR WEEKS HOW OFTEN DID YOUR ASTHMA SYMPTOMS (WHEEZING, COUGHING, SHORTNESS OF BREATH, CHEST TIGHTNESS OR PAIN) WAKE YOU UP AT NIGHT OR EARLIER THAN USUAL IN THE MORNING: NOT AT ALL

## 2021-10-21 ASSESSMENT — PAIN SCALES - GENERAL: PAINLEVEL: MILD PAIN (3)

## 2021-10-21 ASSESSMENT — PATIENT HEALTH QUESTIONNAIRE - PHQ9
SUM OF ALL RESPONSES TO PHQ QUESTIONS 1-9: 11
10. IF YOU CHECKED OFF ANY PROBLEMS, HOW DIFFICULT HAVE THESE PROBLEMS MADE IT FOR YOU TO DO YOUR WORK, TAKE CARE OF THINGS AT HOME, OR GET ALONG WITH OTHER PEOPLE: SOMEWHAT DIFFICULT
SUM OF ALL RESPONSES TO PHQ QUESTIONS 1-9: 11

## 2021-10-21 ASSESSMENT — MIFFLIN-ST. JEOR: SCORE: 1287.71

## 2021-10-21 NOTE — LETTER
[facility]  10/21/21  Patient: George Mcknight  YOB: 1995  Medical Record Number: [id number]                                                                                  Non-Opioid Controlled Substance Agreement    This is an agreement between you and your provider regarding safe and appropriate use of controlled substances prescribed by your care team. Controlled substances are?medicines that can cause physical and mental dependence (abuse).     There are strict laws about having and using these medicines. We here at [facility] are  committed to working with you in your efforts to get better. To support you in this work, we'll help you schedule regular office appointments for medicine refills. If we must cancel or change your appointment for any reason, we'll make sure you have enough medicine to last until your next appointment.     As a Provider, I will:     Listen carefully to your concerns while treating you with respect.     Recommend a treatment plan that I believe is in your best interest and may involve therapies other than medicine.      Talk with you often about the possible benefits and the risk of harm of any medicine that we prescribe for you.    Assess the safety of this medicine and check how well it works.      Provide a plan on how to taper (discontinue or go off) using this medicine if the decision is made to stop its use.      ::  As a Patient, I understand controlled substances:       Are prescribed by my care provider to help me function or work and manage my condition(s).?    Are strong medicines and can cause serious side effects.       Need to be taken exactly as prescribed.?Combining controlled substances with certain medicines or chemicals (such as illegal drugs, alcohol, sedatives, sleeping pills, and benzodiazepines) can be dangerous or even fatal.? If I stop taking my medicines suddenly, I may have severe withdrawal symptoms.     The risks,  benefits, and side effects of these medicine(s) were explained to me. I agree that:    1. I will take part in other treatments as advised by my care team. This may be psychiatry or counseling, physical therapy, behavioral therapy, group treatment or a referral to specialist.    2. I will keep all my appointments and understand this is part of the monitoring of controlled substances.?My care team may require an office visit for EVERY controlled substance refill. If I miss appointments or don t follow instructions, my care team may stop my medicine    3. I will take my medicines as prescribed. I will not change the dose or schedule unless my care team tells me to. There will be no refills if I run out early.      4. I may be asked to come to the clinic and complete a urine drug test or complete a pill count. If I don t give a urine sample or participate in a pill count, the care team may stop my medicine.    5. I will only receive controlled substance prescriptions from this clinic. If I am treated by another provider, I will tell them that I am taking controlled substances and that I have a treatment agreement with this provider. I will inform my [facility] care team within one business day if I am given a prescription for any controlled substance by another healthcare provider. My [facility] care team can contact other providers and pharmacists about my use of any medicines.    6. It is up to me to make sure that I don't run out of my medicines on weekends or holidays.?If my care team is willing to refill my prescription without a visit, I must request refills only during office hours. Refills may take up to 3 business days to process. I will use one pharmacy to fill all my controlled substance prescriptions. I will notify the clinic about any changes to my insurance or medicine availability.    7. I am responsible for my prescriptions. If the medicine/prescription is lost, stolen or destroyed, it will  not be replaced.?I also agree not to share controlled substance medicines with anyone.     8. I am aware I should not use any illegal or recreational drugs. I agree not to drink alcohol unless my care team says I can.     9. If I enroll in the [place] Medical Cannabis program, I will tell my care team before my next refill.    10. I will tell my care team right away if I become pregnant, have a new medical problem treated outside of my regular clinic, or have a change in my medicines.     11. I understand that this medicine can affect my thinking, judgment and reaction time.? Alcohol and drugs affect the brain and body, which can affect the safety of my driving. Being under the influence of alcohol or drugs can affect my decision-making, behaviors, personal safety and the safety of others. Driving while impaired (DWI) can occur if a person is driving, operating or in physical control of a car, motorcycle, boat, snowmobile, ATV, motorbike, off-road vehicle or any other motor vehicle ([place] Statute 169A.20). I understand the risk if I choose to drive or operate any vehicle or machinery.    I understand that if I do not follow any of the conditions above, my prescriptions or treatment may be stopped or changed.   I agree that my provider, clinic care team and pharmacy may work with any city, state or federal law enforcement agency that investigates the misuse, sale or other diversion of my controlled medicine. I will allow my provider to discuss my care with, or share a copy of, this agreement with any other treating provider, pharmacy or emergency room where I receive care.     I have read this agreement and have asked questions about anything I did not understand.    ________________________________________________________  Patient Signature - George Mcknight     ___________________                   Date     ________________________________________________________  Provider Signature - Cny Avendaño MD        ___________________                   Date     ________________________________________________________  Witness Signature (required if provider not present while patient signing)          ___________________                   Date

## 2021-10-21 NOTE — PROGRESS NOTES
"  SUBJECTIVE:   Nursing Notes:   Rubén Robles LPN  10/21/2021  3:47 PM  Signed  Chief Complaint   Patient presents with     Physical   Patient has no concerns or issues.     Initial /76   Pulse 111   Temp 98.3  F (36.8  C) (Tympanic)   Resp 16   Ht 1.613 m (5' 3.5\")   Wt 56.1 kg (123 lb 9.6 oz)   LMP 10/07/2021 (Approximate)   SpO2 100%   Breastfeeding No   BMI 21.55 kg/m   Estimated body mass index is 21.55 kg/m  as calculated from the following:    Height as of this encounter: 1.613 m (5' 3.5\").    Weight as of this encounter: 56.1 kg (123 lb 9.6 oz).  Medication Reconciliation: complete    FOOD SECURITY SCREENING QUESTIONS  Hunger Vital Signs:  Within the past 12 months we worried whether our food would run out before we got money to buy more. Never  Within the past 12 months the food we bought just didn't last and we didn't have money to get more. Never      Advanced Care Directive Reviewed    BENEDICTO Ott MARITZA Mcknight is a 26 year old female who presents to clinic today for a physical.  She weaned herself off of her medications.  Needs to get back on them.  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    HPI    I personally reviewed medications/allergies/history listed below:    Patient Active Problem List    Diagnosis Date Noted     Raynaud's disease without gangrene 03/22/2019     Priority: Medium     Exercise-induced asthma 09/14/2018     Priority: Medium     ADD (attention deficit disorder) 03/25/2017     Priority: Medium     Depression with anxiety 03/25/2017     Priority: Medium     Insomnia 03/25/2017     Priority: Medium     Migraine without status migrainosus, not intractable 03/25/2017     Priority: Medium     Controlled substance agreement 10/08/20 03/24/2017     Priority: Medium     Overview:   Adderall XR 10 mg #30 tablets of each per month       Past Medical History:   Diagnosis Date     Convergence insufficiency     2012,as a " result of TBI - had to wear prism glasses for about 1 year.     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     Migraine without status migrainosus, not intractable     No Comments Provided     Other specified anxiety disorders     No Comments Provided     Other specified behavioral and emotional disorders with onset usually occurring in childhood and adolescence     No Comments Provided      No past surgical history on file.  No family history on file.  Social History     Tobacco Use     Smoking status: Never Smoker     Smokeless tobacco: Never Used   Substance Use Topics     Alcohol use: Yes     Comment: Alcoholic Drinks/day: a few times a year     Social History     Social History Narrative    Student at [employer].  Studying toward her BSN.  Family moved from [place], [place] fall 2016.  Both parents grew up in the [place] area.  Mom - Noel is RN, Clinic Nurse Manager at [employer].  Dad - Armando  Sister - Marianna is  and works as an RN at [employer] in [place].     Current Outpatient Medications   Medication Sig Dispense Refill     amphetamine-dextroamphetamine (ADDERALL) 10 MG tablet Take 1 tablet by mouth in the evening as needed 30 tablet 0     citalopram (CELEXA) 40 MG tablet Take 1 tablet (40 mg) by mouth daily 90 tablet 3     NIFEdipine ER OSMOTIC (PROCARDIA XL) 30 MG 24 hr tablet Take 1 tablet (30 mg) by mouth daily 90 tablet 3     propranolol ER (INDERAL LA) 60 MG 24 hr capsule TAKE 1 CAPSULE BY MOUTH ONCE DAILY 90 capsule 3     SUMAtriptan (IMITREX) 50 MG tablet Take 1 tablet (50 mg) by mouth at onset of headache for migraine May repeat after 2 hours as needed.  Max dose 200 mg per 24 hours. 9 tablet 11     zolpidem (AMBIEN) 10 MG tablet Take 1 tablet (10 mg) by mouth nightly as needed 30 tablet 5     albuterol (PROAIR HFA/PROVENTIL HFA/VENTOLIN HFA) 108 (90 Base) MCG/ACT inhaler Inhale 2 puffs into the lungs every 4 hours as needed for shortness of breath  "/ dyspnea or wheezing (Patient not taking: Reported on 10/21/2021) 1 Inhaler 11     Cholecalciferol (VITAMIN D3) 1000 UNITS CAPS Take 1,000 Units by mouth (Patient not taking: Reported on 10/21/2021)       pseudoePHEDrine (SUDAFED) 60 MG tablet Take 60 mg by mouth (Patient not taking: Reported on 10/21/2021)       Allergies   Allergen Reactions     Cats Other (See Comments)     Swelling of face/eyes/throat     No Clinical Screening - See Comments Other (See Comments)     Trees - itchy  Swelling of face/eyes/throat  Trees - itchy     Seasonal Allergies Other (See Comments)     Trees - itchy     Trichophyton Other (See Comments)     Swelling of face/eyes/throat       Review of Systems   Constitutional: Negative for chills and fever.   Respiratory: Negative for cough and shortness of breath.    Cardiovascular: Negative for peripheral edema.   Psychiatric/Behavioral: Negative for mood changes. The patient is not nervous/anxious.         OBJECTIVE:     /76   Pulse 111   Temp 98.3  F (36.8  C) (Tympanic)   Resp 16   Ht 1.613 m (5' 3.5\")   Wt 56.1 kg (123 lb 9.6 oz)   LMP 10/07/2021 (Approximate)   SpO2 100%   Breastfeeding No   BMI 21.55 kg/m    Body mass index is 21.55 kg/m .  Physical Exam  Constitutional:       Appearance: She is well-developed.   HENT:      Head: Normocephalic.      Right Ear: External ear normal.      Left Ear: External ear normal.      Nose: Nose normal.   Eyes:      Pupils: Pupils are equal, round, and reactive to light.   Neck:      Thyroid: No thyromegaly.   Cardiovascular:      Rate and Rhythm: Normal rate and regular rhythm.      Heart sounds: Normal heart sounds. No murmur heard.     Pulmonary:      Effort: Pulmonary effort is normal. No respiratory distress.      Breath sounds: Normal breath sounds. No wheezing or rales.   Chest:      Chest wall: No tenderness.   Abdominal:      General: Bowel sounds are normal. There is no distension.      Palpations: Abdomen is soft. There is " no mass.      Tenderness: There is no abdominal tenderness. There is no guarding or rebound.   Musculoskeletal:         General: No tenderness or deformity.      Cervical back: Normal range of motion and neck supple.   Lymphadenopathy:      Cervical: No cervical adenopathy.   Skin:     General: Skin is warm and dry.   Neurological:      Mental Status: She is alert and oriented to person, place, and time.      Cranial Nerves: No cranial nerve deficit.      Coordination: Coordination normal.   Psychiatric:         Mood and Affect: Mood normal.         Behavior: Behavior normal.           PHQ-9 SCORE 3/22/2019 10/8/2020 10/21/2021   PHQ-9 Total Score MyChart - - 11 (Moderate depression)   PHQ-9 Total Score 5 5 11       PHQ-2 Score:     PHQ-2 ( 1999 Pfizer) 10/8/2020 7/15/2020   Q1: Little interest or pleasure in doing things 0 -   Q2: Feeling down, depressed or hopeless 0 0   PHQ-2 Score 0 -       HECTOR-7 SCORE 3/22/2019 10/8/2020 10/21/2021   Total Score - - 8 (mild anxiety)   Total Score 2 11 8         ACT Total Scores 9/14/2018 10/21/2021   ACT TOTAL SCORE (Goal Greater than or Equal to 20) 25 25   In the past 12 months, how many times did you visit the emergency room for your asthma without being admitted to the hospital? 0 0   In the past 12 months, how many times were you hospitalized overnight because of your asthma? 0 0         I personally reviewed results withpatient as listed below:   Diagnostic Test Results:  none     ASSESSMENT/PLAN:       ICD-10-CM    1. Health care maintenance  Z00.00    2. Depression with anxiety  F41.8 citalopram (CELEXA) 40 MG tablet   3. Other migraine without status migrainosus, not intractable  G43.809 propranolol ER (INDERAL LA) 60 MG 24 hr capsule   4. Raynaud's disease without gangrene  I73.00 NIFEdipine ER OSMOTIC (PROCARDIA XL) 30 MG 24 hr tablet   5. Other migraine, not intractable, without status migrainosus  G43.809 SUMAtriptan (IMITREX) 50 MG tablet   6. Primary insomnia   F51.01 zolpidem (AMBIEN) 10 MG tablet   7. Attention deficit disorder, unspecified hyperactivity presence  F98.8 amphetamine-dextroamphetamine (ADDERALL) 10 MG tablet     Drug Confirmation Panel Urine with Creat     Drug Confirmation Panel Urine with Creat       1.  Tdap is up-to-date, last completed 10/8/2020.  Flu shot is up-to-date, last completed 10/7/2021.  She plans to get her Covid booster soon.  Her Pap was last completed 2 years ago in [place] and was normal at that time.  2.  Restart Celexa.  Prescription given.  3.  Propranolol refilled.  4.  Nifedipine refilled.  5.  Sumatriptan refilled.  6.  Ambien refilled.  7.  She uses this only intermittently.  Updated contract and drug screen were completed today.  Adderall 10 mg #30 refilled.  Follow-up for further refills if needed.    Cyn Avendaño MD  [facility]        Answers for HPI/ROS submitted by the patient on 10/21/2021  If you checked off any problems, how difficult have these problems made it for you to do your work, take care of things at home, or get along with other people?: Somewhat difficult  PHQ9 TOTAL SCORE: 11  HECTOR 7 TOTAL SCORE: 8

## 2021-10-21 NOTE — NURSING NOTE
"Chief Complaint   Patient presents with     Physical   Patient has no concerns or issues.     Initial /76   Pulse 111   Temp 98.3  F (36.8  C) (Tympanic)   Resp 16   Ht 1.613 m (5' 3.5\")   Wt 56.1 kg (123 lb 9.6 oz)   LMP 10/07/2021 (Approximate)   SpO2 100%   Breastfeeding No   BMI 21.55 kg/m   Estimated body mass index is 21.55 kg/m  as calculated from the following:    Height as of this encounter: 1.613 m (5' 3.5\").    Weight as of this encounter: 56.1 kg (123 lb 9.6 oz).  Medication Reconciliation: complete    FOOD SECURITY SCREENING QUESTIONS  Hunger Vital Signs:  Within the past 12 months we worried whether our food would run out before we got money to buy more. Never  Within the past 12 months the food we bought just didn't last and we didn't have money to get more. Never      Advanced Care Directive Reviewed    Rubén Robles LPN    " Patient calling to state that she has been putting Vaseline and then the Aquasil.   Home care nurse came out and advised patient that she was not supposed to be putting a barrier between the Aquasil and the leg.   Patient has started today to put the Aquasil directly on the wound.  States that the wound looks bad- increasing redness    burning started with the Aquasil application directly to the wound- but has lessened   Has a large black spot on the side and turned bright red  Has appointment on Friday with Dr. Yu- is this okay to wait?    home care coming twice a week for bandage changes    patient has pictures of the wound  Does not think that she can make it in today to have Dr. Yu look at the wounds- takes care of her grandchildren    Mary Torres RN  [facility]  [phone]

## 2021-10-22 ASSESSMENT — ANXIETY QUESTIONNAIRES: GAD7 TOTAL SCORE: 8

## 2021-10-22 ASSESSMENT — PATIENT HEALTH QUESTIONNAIRE - PHQ9: SUM OF ALL RESPONSES TO PHQ QUESTIONS 1-9: 11

## 2021-10-22 ASSESSMENT — ASTHMA QUESTIONNAIRES: ACT_TOTALSCORE: 25

## 2021-10-26 ENCOUNTER — MYC MEDICAL ADVICE (OUTPATIENT)
Dept: FAMILY MEDICINE | Facility: OTHER | Age: 26
End: 2021-10-26

## 2021-10-26 LAB
AMPHET UR CFM-MCNC: 208 NG/ML
AMPHET/CREAT UR: 150 NG/MG {CREAT}
CODEINE UR CFM-MCNC: 110 NG/ML
CODEINE/CREAT UR: 79 NG/MG {CREAT}

## 2021-11-09 ENCOUNTER — MYC MEDICAL ADVICE (OUTPATIENT)
Dept: FAMILY MEDICINE | Facility: OTHER | Age: 26
End: 2021-11-09
Payer: COMMERCIAL

## 2021-11-09 NOTE — PATIENT INSTRUCTIONS
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  Cyn Avendaoñ MD

## 2021-11-10 ENCOUNTER — MYC MEDICAL ADVICE (OUTPATIENT)
Dept: FAMILY MEDICINE | Facility: OTHER | Age: 26
End: 2021-11-10
Payer: COMMERCIAL

## 2021-11-10 NOTE — LETTER
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        Sincerely,        Cyn Avendaño MD

## 2021-11-12 ENCOUNTER — APPOINTMENT (OUTPATIENT)
Dept: OCCUPATIONAL MEDICINE | Facility: OTHER | Age: 26
End: 2021-11-12

## 2021-11-12 ENCOUNTER — MYC MEDICAL ADVICE (OUTPATIENT)
Dept: FAMILY MEDICINE | Facility: OTHER | Age: 26
End: 2021-11-12
Payer: COMMERCIAL

## 2021-11-12 PROCEDURE — 86580 TB INTRADERMAL TEST: CPT

## 2022-03-04 ENCOUNTER — MYC REFILL (OUTPATIENT)
Dept: FAMILY MEDICINE | Facility: OTHER | Age: 27
End: 2022-03-04
Payer: COMMERCIAL

## 2022-03-04 DIAGNOSIS — F98.8 ATTENTION DEFICIT DISORDER, UNSPECIFIED HYPERACTIVITY PRESENCE: ICD-10-CM

## 2022-03-04 RX ORDER — DEXTROAMPHETAMINE SACCHARATE, AMPHETAMINE ASPARTATE, DEXTROAMPHETAMINE SULFATE AND AMPHETAMINE SULFATE 2.5; 2.5; 2.5; 2.5 MG/1; MG/1; MG/1; MG/1
TABLET ORAL
Qty: 30 TABLET | Refills: 0 | Status: CANCELLED | OUTPATIENT
Start: 2022-03-04

## 2022-06-23 ENCOUNTER — E-VISIT (OUTPATIENT)
Dept: FAMILY MEDICINE | Facility: OTHER | Age: 27
End: 2022-06-23
Attending: FAMILY MEDICINE
Payer: COMMERCIAL

## 2022-06-23 ENCOUNTER — TELEPHONE (OUTPATIENT)
Dept: FAMILY MEDICINE | Facility: OTHER | Age: 27
End: 2022-06-23

## 2022-06-23 DIAGNOSIS — R11.0 NAUSEA: Primary | ICD-10-CM

## 2022-06-23 NOTE — TELEPHONE ENCOUNTER
Please call patient to set up a visit to address the nausea/anxiety that she submitted an e-visit for.  Virtual visit ok.  Cyn Avendaño MD

## 2022-06-24 ENCOUNTER — VIRTUAL VISIT (OUTPATIENT)
Dept: FAMILY MEDICINE | Facility: OTHER | Age: 27
End: 2022-06-24
Attending: FAMILY MEDICINE
Payer: COMMERCIAL

## 2022-06-24 DIAGNOSIS — F41.8 DEPRESSION WITH ANXIETY: Primary | ICD-10-CM

## 2022-06-24 DIAGNOSIS — R11.0 NAUSEA: ICD-10-CM

## 2022-06-24 DIAGNOSIS — K21.9 GASTROESOPHAGEAL REFLUX DISEASE WITHOUT ESOPHAGITIS: ICD-10-CM

## 2022-06-24 DIAGNOSIS — F98.8 ATTENTION DEFICIT DISORDER, UNSPECIFIED HYPERACTIVITY PRESENCE: ICD-10-CM

## 2022-06-24 PROCEDURE — 99213 OFFICE O/P EST LOW 20 MIN: CPT | Mod: 95 | Performed by: FAMILY MEDICINE

## 2022-06-24 RX ORDER — DEXTROAMPHETAMINE SACCHARATE, AMPHETAMINE ASPARTATE, DEXTROAMPHETAMINE SULFATE AND AMPHETAMINE SULFATE 2.5; 2.5; 2.5; 2.5 MG/1; MG/1; MG/1; MG/1
TABLET ORAL
Qty: 30 TABLET | Refills: 0 | Status: SHIPPED | OUTPATIENT
Start: 2022-07-22 | End: 2023-01-19

## 2022-06-24 RX ORDER — DEXTROAMPHETAMINE SACCHARATE, AMPHETAMINE ASPARTATE, DEXTROAMPHETAMINE SULFATE AND AMPHETAMINE SULFATE 2.5; 2.5; 2.5; 2.5 MG/1; MG/1; MG/1; MG/1
TABLET ORAL
Qty: 30 TABLET | Refills: 0 | Status: SHIPPED | OUTPATIENT
Start: 2022-08-19 | End: 2023-01-19

## 2022-06-24 RX ORDER — CITALOPRAM HYDROBROMIDE 20 MG/1
TABLET ORAL
Qty: 11 TABLET | Refills: 0 | Status: SHIPPED | OUTPATIENT
Start: 2022-06-24 | End: 2023-01-19

## 2022-06-24 RX ORDER — DEXTROAMPHETAMINE SACCHARATE, AMPHETAMINE ASPARTATE, DEXTROAMPHETAMINE SULFATE AND AMPHETAMINE SULFATE 2.5; 2.5; 2.5; 2.5 MG/1; MG/1; MG/1; MG/1
TABLET ORAL
Qty: 30 TABLET | Refills: 0 | Status: SHIPPED | OUTPATIENT
Start: 2022-06-24 | End: 2023-01-19

## 2022-06-24 ASSESSMENT — ANXIETY QUESTIONNAIRES
5. BEING SO RESTLESS THAT IT IS HARD TO SIT STILL: MORE THAN HALF THE DAYS
IF YOU CHECKED OFF ANY PROBLEMS ON THIS QUESTIONNAIRE, HOW DIFFICULT HAVE THESE PROBLEMS MADE IT FOR YOU TO DO YOUR WORK, TAKE CARE OF THINGS AT HOME, OR GET ALONG WITH OTHER PEOPLE: SOMEWHAT DIFFICULT
1. FEELING NERVOUS, ANXIOUS, OR ON EDGE: MORE THAN HALF THE DAYS
3. WORRYING TOO MUCH ABOUT DIFFERENT THINGS: MORE THAN HALF THE DAYS
6. BECOMING EASILY ANNOYED OR IRRITABLE: SEVERAL DAYS
2. NOT BEING ABLE TO STOP OR CONTROL WORRYING: MORE THAN HALF THE DAYS
GAD7 TOTAL SCORE: 12
7. FEELING AFRAID AS IF SOMETHING AWFUL MIGHT HAPPEN: SEVERAL DAYS
GAD7 TOTAL SCORE: 12

## 2022-06-24 ASSESSMENT — PAIN SCALES - GENERAL: PAINLEVEL: MILD PAIN (2)

## 2022-06-24 ASSESSMENT — PATIENT HEALTH QUESTIONNAIRE - PHQ9: 5. POOR APPETITE OR OVEREATING: MORE THAN HALF THE DAYS

## 2022-06-24 NOTE — NURSING NOTE
Chief Complaint   Patient presents with     Anxiety     Would like to discuss her anxiety. Also needs refill on Adderall.     Medication Reconciliation: complete    Irene Lyons LPN

## 2022-06-24 NOTE — PATIENT INSTRUCTIONS
To switch from Celexa to Zoloft:  Week #1:  Take Celexa 20 mg daily WITH Zoloft 25 mg daily (1/2 of 50 mg tablet).  Week #2:  Take Celexa 10 mg daily (1/2 of 20 mg tablet) WITH Zoloft 50 mg daily.  Week #3:  Stop Celexa.  Continue on Zoloft 50 mg daily.

## 2022-06-24 NOTE — PROGRESS NOTES
Nursing Notes:   Irene Lyons LPN  6/24/2022  1:31 PM  Signed  Chief Complaint   Patient presents with     Anxiety     Would like to discuss her anxiety. Also needs refill on Adderall.     Medication Reconciliation: complete    Irene KRAUSE BENEDICTO Lyons is a 26 year old who is being evaluated via a billable telephone visit.      What phone number would you like to be contacted at? [phone]  How would you like to obtain your AVS? Yared Vazquez is a 26 year old, presenting for the following health issues:  Anxiety    She has been struggling a little more with anxiety.  She is taking celexa 40 mg daily.  It was increased a few months ago.  She has been having more issues with anxiety lately.  She is taking summer college courses.  The courses she is taking are enjoyable for her and she doesn't feel particularly stressed about it.  She has a mild amount of depression and feels like this is better.  She doesn't feel like the increased dose of celexa helped at all.  She has taken prozac in the past as well, which didn't really help.  She has had some mild nausea at times.  She feels like it is worse with anxiety.  She had tried omeprazole for gastroesophageal reflux disease symptoms.  Helped acid reflux, but not the nausea.    She also needs a refill of the adderall she uses for ADD symptoms.      HPI Answers for HPI/ROS submitted by the patient on 6/23/2022  Headache Symptoms are: no change  How often are you getting headaches or migraines? : Weekly  Are you able to do normal daily activities when you have a migraine?: No  Migraine Rescue/Relief Medications:: Ibuprofen (Advil, Motrin), Tylenol, sumatriptan (Imitrex)  Effectiveness of rescue/relief medications:: I get some relief  Migraine Preventative Medications:: Inderal  ER or UC Visits:: 0 times  What is the reason for your visit today? : More frequent nausea/GERD often accompanied with migraines  How many servings of fruits and  vegetables do you eat daily?: 2-3  On average, how many sweetened beverages do you drink each day (Examples: soda, juice, sweet tea, etc.  Do NOT count diet or artificially sweetened beverages)?: 0  How many minutes a day do you exercise enough to make your heart beat faster?: 30 to 60  How many days a week do you exercise enough to make your heart beat faster?: 4  How many days per week do you miss taking your medication?: 1  What makes it hard for you to take your medication every day?: remembering to take              Review of Systems   Constitutional, HEENT, cardiovascular, pulmonary, GI, , musculoskeletal, neuro, skin, endocrine and psych systems are negative, except as otherwise noted.      Objective    Vitals - Patient Reported  Pain Score: Mild Pain (2) (headache)        Physical Exam   healthy, alert and no distress  PSYCH: Alert and oriented times 3; coherent speech, normal   rate and volume, able to articulate logical thoughts, able   to abstract reason, no tangential thoughts, no hallucinations   or delusions  Her affect is normal  RESP: No cough, no audible wheezing, able to talk in full sentences  Remainder of exam unable to be completed due to telephone visits    HECTOR-7 SCORE 10/8/2020 10/21/2021 6/24/2022   Total Score - 8 (mild anxiety) -   Total Score 11 8 12     PHQ 3/22/2019 10/8/2020 10/21/2021   PHQ-9 Total Score 5 5 11   Q9: Thoughts of better off dead/self-harm past 2 weeks Not at all Not at all Not at all                   Assessment & Plan       ICD-10-CM    1. Depression with anxiety  F41.8 citalopram (CELEXA) 20 MG tablet     sertraline (ZOLOFT) 50 MG tablet   2. Attention deficit disorder, unspecified hyperactivity presence  F98.8 amphetamine-dextroamphetamine (ADDERALL) 10 MG tablet     amphetamine-dextroamphetamine (ADDERALL) 10 MG tablet     amphetamine-dextroamphetamine (ADDERALL) 10 MG tablet   3. Nausea  R11.0    4. Gastroesophageal reflux disease without esophagitis  K21.9       1.  Not adequately controlled.  Will transition from Celexa to Zoloft as noted below.  If not improving, follow up.  2.   reviewed and appropriate.  Adderall 10 mg #30 x3 refilled today.  Contract and drug screen were both last completed on 10/21/21.  3.  She is going to try nexium over the counter instead.  Thinks this could be anxiety related.  Discussed if not improving with more aggressive treatment of her anxiety as well as gastroesophageal reflux disease, she should follow up for further evaluation.  4.  See #3.    7 minutes spent on the date of the encounter doing chart review, patient visit and documentation     Cyn Avendaño MD  [facility]    Phone call duration: 7 minutes    .  ..

## 2022-09-17 ENCOUNTER — HEALTH MAINTENANCE LETTER (OUTPATIENT)
Age: 27
End: 2022-09-17

## 2022-10-31 ENCOUNTER — TRANSFERRED RECORDS (OUTPATIENT)
Dept: HEALTH INFORMATION MANAGEMENT | Facility: OTHER | Age: 27
End: 2022-10-31

## 2022-11-18 ENCOUNTER — MYC REFILL (OUTPATIENT)
Dept: FAMILY MEDICINE | Facility: OTHER | Age: 27
End: 2022-11-18

## 2022-11-18 ENCOUNTER — MYC MEDICAL ADVICE (OUTPATIENT)
Dept: FAMILY MEDICINE | Facility: OTHER | Age: 27
End: 2022-11-18

## 2022-11-18 DIAGNOSIS — F98.8 ATTENTION DEFICIT DISORDER, UNSPECIFIED HYPERACTIVITY PRESENCE: ICD-10-CM

## 2022-11-18 ASSESSMENT — ASTHMA QUESTIONNAIRES
ACT_TOTALSCORE: 22
QUESTION_2 LAST FOUR WEEKS HOW OFTEN HAVE YOU HAD SHORTNESS OF BREATH: ONCE OR TWICE A WEEK
QUESTION_4 LAST FOUR WEEKS HOW OFTEN HAVE YOU USED YOUR RESCUE INHALER OR NEBULIZER MEDICATION (SUCH AS ALBUTEROL): NOT AT ALL
QUESTION_3 LAST FOUR WEEKS HOW OFTEN DID YOUR ASTHMA SYMPTOMS (WHEEZING, COUGHING, SHORTNESS OF BREATH, CHEST TIGHTNESS OR PAIN) WAKE YOU UP AT NIGHT OR EARLIER THAN USUAL IN THE MORNING: NOT AT ALL
QUESTION_5 LAST FOUR WEEKS HOW WOULD YOU RATE YOUR ASTHMA CONTROL: WELL CONTROLLED
QUESTION_1 LAST FOUR WEEKS HOW MUCH OF THE TIME DID YOUR ASTHMA KEEP YOU FROM GETTING AS MUCH DONE AT WORK, SCHOOL OR AT HOME: A LITTLE OF THE TIME
ACT_TOTALSCORE: 22

## 2022-11-22 RX ORDER — DEXTROAMPHETAMINE SACCHARATE, AMPHETAMINE ASPARTATE, DEXTROAMPHETAMINE SULFATE AND AMPHETAMINE SULFATE 2.5; 2.5; 2.5; 2.5 MG/1; MG/1; MG/1; MG/1
TABLET ORAL
Qty: 30 TABLET | Refills: 0 | OUTPATIENT
Start: 2022-11-22

## 2022-11-22 NOTE — TELEPHONE ENCOUNTER
Patient sent eROI message requesting  Rx for the following:      Requested Prescriptions   Pending Prescriptions Disp Refills     amphetamine-dextroamphetamine (ADDERALL) 10 MG tablet 30 tablet 0     Sig: Take 1 tablet by mouth daily.       There is no refill protocol information for this order        Last Prescription Date:   08/19/22  Last Fill Qty/Refills:         30, R-0  Last Office Visit:              06/24/22   Future Office visit:             Next 5 appointments (look out 90 days)    Dec 15, 2022  3:40 PM  [facility] with Cyn Avendaño MD  [facility] ([facility] ) [address]  [place] [id number] [phone]        Roxy Ramos RN on 11/22/2022 at 10:37 AM

## 2023-01-18 ASSESSMENT — ASTHMA QUESTIONNAIRES
QUESTION_3 LAST FOUR WEEKS HOW OFTEN DID YOUR ASTHMA SYMPTOMS (WHEEZING, COUGHING, SHORTNESS OF BREATH, CHEST TIGHTNESS OR PAIN) WAKE YOU UP AT NIGHT OR EARLIER THAN USUAL IN THE MORNING: NOT AT ALL
QUESTION_5 LAST FOUR WEEKS HOW WOULD YOU RATE YOUR ASTHMA CONTROL: COMPLETELY CONTROLLED
QUESTION_4 LAST FOUR WEEKS HOW OFTEN HAVE YOU USED YOUR RESCUE INHALER OR NEBULIZER MEDICATION (SUCH AS ALBUTEROL): NOT AT ALL
QUESTION_1 LAST FOUR WEEKS HOW MUCH OF THE TIME DID YOUR ASTHMA KEEP YOU FROM GETTING AS MUCH DONE AT WORK, SCHOOL OR AT HOME: NONE OF THE TIME
ACT_TOTALSCORE: 24
QUESTION_2 LAST FOUR WEEKS HOW OFTEN HAVE YOU HAD SHORTNESS OF BREATH: ONCE OR TWICE A WEEK
ACT_TOTALSCORE: 24

## 2023-01-18 ASSESSMENT — ENCOUNTER SYMPTOMS
HEADACHES: 1
CHILLS: 0
DIARRHEA: 1
DIZZINESS: 0
BREAST MASS: 0
FEVER: 0
HEMATURIA: 0
ARTHRALGIAS: 0
ABDOMINAL PAIN: 0
HEMATOCHEZIA: 0
SORE THROAT: 0
PALPITATIONS: 0
NERVOUS/ANXIOUS: 1
COUGH: 0
MYALGIAS: 0
FREQUENCY: 0
HEARTBURN: 0
EYE PAIN: 0
NAUSEA: 1
PARESTHESIAS: 0
WEAKNESS: 0
JOINT SWELLING: 0
SHORTNESS OF BREATH: 0
DYSURIA: 0
CONSTIPATION: 1

## 2023-01-18 ASSESSMENT — ANXIETY QUESTIONNAIRES
IF YOU CHECKED OFF ANY PROBLEMS ON THIS QUESTIONNAIRE, HOW DIFFICULT HAVE THESE PROBLEMS MADE IT FOR YOU TO DO YOUR WORK, TAKE CARE OF THINGS AT HOME, OR GET ALONG WITH OTHER PEOPLE: SOMEWHAT DIFFICULT
6. BECOMING EASILY ANNOYED OR IRRITABLE: SEVERAL DAYS
2. NOT BEING ABLE TO STOP OR CONTROL WORRYING: MORE THAN HALF THE DAYS
8. IF YOU CHECKED OFF ANY PROBLEMS, HOW DIFFICULT HAVE THESE MADE IT FOR YOU TO DO YOUR WORK, TAKE CARE OF THINGS AT HOME, OR GET ALONG WITH OTHER PEOPLE?: SOMEWHAT DIFFICULT
7. FEELING AFRAID AS IF SOMETHING AWFUL MIGHT HAPPEN: SEVERAL DAYS
7. FEELING AFRAID AS IF SOMETHING AWFUL MIGHT HAPPEN: SEVERAL DAYS
GAD7 TOTAL SCORE: 12
1. FEELING NERVOUS, ANXIOUS, OR ON EDGE: NEARLY EVERY DAY
3. WORRYING TOO MUCH ABOUT DIFFERENT THINGS: MORE THAN HALF THE DAYS
4. TROUBLE RELAXING: MORE THAN HALF THE DAYS
5. BEING SO RESTLESS THAT IT IS HARD TO SIT STILL: SEVERAL DAYS
GAD7 TOTAL SCORE: 12
GAD7 TOTAL SCORE: 12

## 2023-01-18 ASSESSMENT — PATIENT HEALTH QUESTIONNAIRE - PHQ9
SUM OF ALL RESPONSES TO PHQ QUESTIONS 1-9: 15
10. IF YOU CHECKED OFF ANY PROBLEMS, HOW DIFFICULT HAVE THESE PROBLEMS MADE IT FOR YOU TO DO YOUR WORK, TAKE CARE OF THINGS AT HOME, OR GET ALONG WITH OTHER PEOPLE: SOMEWHAT DIFFICULT
SUM OF ALL RESPONSES TO PHQ QUESTIONS 1-9: 15

## 2023-01-19 ENCOUNTER — OFFICE VISIT (OUTPATIENT)
Dept: FAMILY MEDICINE | Facility: OTHER | Age: 28
End: 2023-01-19
Attending: FAMILY MEDICINE
Payer: COMMERCIAL

## 2023-01-19 VITALS
HEART RATE: 87 BPM | OXYGEN SATURATION: 100 % | BODY MASS INDEX: 22.67 KG/M2 | RESPIRATION RATE: 18 BRPM | DIASTOLIC BLOOD PRESSURE: 78 MMHG | WEIGHT: 132.8 LBS | HEIGHT: 64 IN | SYSTOLIC BLOOD PRESSURE: 98 MMHG | TEMPERATURE: 96.3 F

## 2023-01-19 DIAGNOSIS — R56.9 SEIZURES (H): ICD-10-CM

## 2023-01-19 DIAGNOSIS — Z23 NEED FOR COVID-19 VACCINE: ICD-10-CM

## 2023-01-19 DIAGNOSIS — Z12.4 CERVICAL CANCER SCREENING: ICD-10-CM

## 2023-01-19 DIAGNOSIS — F98.8 ATTENTION DEFICIT DISORDER, UNSPECIFIED HYPERACTIVITY PRESENCE: ICD-10-CM

## 2023-01-19 DIAGNOSIS — F41.9 ANXIETY: ICD-10-CM

## 2023-01-19 DIAGNOSIS — J45.990 EXERCISE-INDUCED ASTHMA: ICD-10-CM

## 2023-01-19 DIAGNOSIS — Z00.00 HEALTH CARE MAINTENANCE: Primary | ICD-10-CM

## 2023-01-19 LAB
CREAT UR-MCNC: 172 MG/DL
HOLD SPECIMEN: NORMAL
TSH SERPL DL<=0.005 MIU/L-ACNC: 1.52 UIU/ML (ref 0.3–4.2)

## 2023-01-19 PROCEDURE — 80307 DRUG TEST PRSMV CHEM ANLYZR: CPT | Mod: ZL | Performed by: FAMILY MEDICINE

## 2023-01-19 PROCEDURE — 82306 VITAMIN D 25 HYDROXY: CPT | Mod: ZL | Performed by: FAMILY MEDICINE

## 2023-01-19 PROCEDURE — G0123 SCREEN CERV/VAG THIN LAYER: HCPCS | Performed by: FAMILY MEDICINE

## 2023-01-19 PROCEDURE — 0124A COVID-19 VACCINE BIVALENT BOOSTER 12+ (PFIZER): CPT | Performed by: FAMILY MEDICINE

## 2023-01-19 PROCEDURE — 84443 ASSAY THYROID STIM HORMONE: CPT | Mod: ZL | Performed by: FAMILY MEDICINE

## 2023-01-19 PROCEDURE — 99395 PREV VISIT EST AGE 18-39: CPT | Mod: 25 | Performed by: FAMILY MEDICINE

## 2023-01-19 PROCEDURE — 36415 COLL VENOUS BLD VENIPUNCTURE: CPT | Mod: ZL | Performed by: FAMILY MEDICINE

## 2023-01-19 PROCEDURE — 91312 COVID-19 VACCINE BIVALENT BOOSTER 12+ (PFIZER): CPT | Performed by: FAMILY MEDICINE

## 2023-01-19 RX ORDER — NIFEDIPINE 30 MG/1
1 TABLET, EXTENDED RELEASE ORAL DAILY
COMMUNITY
Start: 2022-08-03 | End: 2023-01-19

## 2023-01-19 RX ORDER — DEXTROAMPHETAMINE SACCHARATE, AMPHETAMINE ASPARTATE, DEXTROAMPHETAMINE SULFATE AND AMPHETAMINE SULFATE 2.5; 2.5; 2.5; 2.5 MG/1; MG/1; MG/1; MG/1
TABLET ORAL
Qty: 30 TABLET | Refills: 0 | Status: SHIPPED | OUTPATIENT
Start: 2023-01-19 | End: 2023-11-02

## 2023-01-19 RX ORDER — DEXTROAMPHETAMINE SACCHARATE, AMPHETAMINE ASPARTATE, DEXTROAMPHETAMINE SULFATE AND AMPHETAMINE SULFATE 2.5; 2.5; 2.5; 2.5 MG/1; MG/1; MG/1; MG/1
TABLET ORAL
Qty: 30 TABLET | Refills: 0 | Status: SHIPPED | OUTPATIENT
Start: 2023-03-16 | End: 2023-05-26

## 2023-01-19 RX ORDER — DEXTROAMPHETAMINE SACCHARATE, AMPHETAMINE ASPARTATE, DEXTROAMPHETAMINE SULFATE AND AMPHETAMINE SULFATE 2.5; 2.5; 2.5; 2.5 MG/1; MG/1; MG/1; MG/1
TABLET ORAL
Qty: 30 TABLET | Refills: 0 | Status: SHIPPED | OUTPATIENT
Start: 2023-02-16 | End: 2023-05-26

## 2023-01-19 RX ORDER — HYDROXYZINE PAMOATE 25 MG/1
25 CAPSULE ORAL 3 TIMES DAILY PRN
COMMUNITY
Start: 2022-11-25 | End: 2023-08-10

## 2023-01-19 ASSESSMENT — PAIN SCALES - GENERAL: PAINLEVEL: MILD PAIN (3)

## 2023-01-19 ASSESSMENT — ENCOUNTER SYMPTOMS
DIZZINESS: 0
HEADACHES: 1
CHILLS: 0
FREQUENCY: 0
MYALGIAS: 0
JOINT SWELLING: 0
CONSTIPATION: 1
WEAKNESS: 0
HEARTBURN: 0
ARTHRALGIAS: 0
EYE PAIN: 0
HEMATOCHEZIA: 0
BREAST MASS: 0
ABDOMINAL PAIN: 0
DIARRHEA: 1
SHORTNESS OF BREATH: 0
FEVER: 0
DYSURIA: 0
SORE THROAT: 0
NAUSEA: 1
PARESTHESIAS: 0
NERVOUS/ANXIOUS: 1
HEMATURIA: 0
COUGH: 0
PALPITATIONS: 0

## 2023-01-19 NOTE — PROGRESS NOTES
Nursing Notes:   Silvana Cantor RN  1/19/2023  1:15 PM  Signed  Chief Complaint   Patient presents with     Physical       Advanced Care Directive on file? No    Medication Reconciliation: complete    FOOD SECURITY SCREENING QUESTIONS  The next two questions are to help us understand your food security.  If you are feeling you need any assistance in this area, we have resources available to support you today.    Hunger Vital Signs:  Within the past 12 months we worried whether our food would run out before we got money to buy more. Never  Within the past 12 months the food we bought just didn't last and we didn't have money to get more. Never      Silvana Cantor RN 1/19/2023 1:03 PM       SUBJECTIVE:   CC: George is an 27 year old who presents for preventive health visit.   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  She had an episode on 10/31/22 of a witnessed symptoms.  She was seen in the Emergency Department on 10/31/22.  She has a prior history of TBI.  The episode was witnessed by a friend and was described as eye twitching, upper extremity twitching.  There is no apnea or cyanosis.  The episode lasted approximately 2 to 3 minutes and resolved on its own.  By the time EMS arrived she was confused.  Upon arrival to the ER she appeared postictal.  She was observed in the ER and returned to her baseline.  She had symptoms of a migraine  as well accompanying this.  She was given a migraine cocktail with Toradol, Benadryl Reglan magnesium and IV fluids.  She had significant improvement in her symptoms.  She has had a prior history of seizures related to her TBI previously but has never been on any antiepileptics.  Her last seizure prior to this was about 2 years ago.  It was not felt that antiepileptics were necessary at that time due to very infrequent seizures.  CT was completed which did not show any acute abnormality.  He was referred to neurology, but now that she is going to be working in [facility] for a few weeks, she wants to set up a neurology appointment closer to home.    Has been seeing someone for counseling for the last few weeks and is on sertraline currently for anxiety and depression symptoms.  She feels that this dose is working well for her.  She also needs refills of her Adderall today.  This has been stable as well.      Today's PHQ-2 Score:   PHQ-2 ( 1999 Pfizer) 1/18/2023   Q1: Little interest or pleasure in doing things 2   Q2: Feeling down, depressed or hopeless 2   PHQ-2 Score 4   PHQ-2 Total Score (12-17 Years)- Positive if 3 or more points; Administer PHQ-A if positive -   Q1: Little interest or pleasure in doing things More than half the days   Q2: Feeling down, depressed or hopeless More than half the days   PHQ-2 Score 4     PHQ 10/8/2020 10/21/2021 1/18/2023   PHQ-9 Total Score 5 11 15   Q9: Thoughts of better off dead/self-harm past 2 weeks Not at all Not at all Not at all     HECTOR-7 SCORE 10/21/2021 6/24/2022 1/18/2023   Total Score 8 (mild anxiety) - 12 (moderate anxiety)   Total Score 8 12 12           Have you ever done Advance Care Planning? (For example, a Health Directive, POLST, or a discussion with a medical provider or your loved ones about your wishes): No, advance care planning information given to patient to review.  Patient declined advance care planning discussion at this time.    Social History      Tobacco Use     Smoking status: Never     Smokeless tobacco: Never   Substance Use Topics     Alcohol use: Yes     Comment: Alcoholic Drinks/day: a few times a month     If you drink alcohol do you typically have >3 drinks per day or >7 drinks per week? No    Alcohol Use 1/19/2023   Prescreen: >3 drinks/day or >7 drinks/week? -   Prescreen: >3 drinks/day or >7 drinks/week? No       Reviewed orders with patient.  Reviewed health maintenance and updated orders accordingly - Yes  BP Readings from Last 3 Encounters:   01/19/23 98/78   10/21/21 112/76   06/22/21 108/68    Wt Readings from Last 3 Encounters:   01/19/23 60.2 kg (132 lb 12.8 oz)   10/21/21 56.1 kg (123 lb 9.6 oz)   06/22/21 55.4 kg (122 lb 3.2 oz)                  Patient Active Problem List   Diagnosis     ADD (attention deficit disorder)     Controlled substance agreement 10/08/20     Depression with anxiety     Insomnia     Migraine without status migrainosus, not intractable     Exercise-induced asthma     Raynaud's disease without gangrene     No past surgical history on file.    Social History     Tobacco Use     Smoking status: Never     Smokeless tobacco: Never   Substance Use Topics     Alcohol use: Yes     Comment: Alcoholic Drinks/day: a few times a month     No family history on file.      Current Outpatient Medications   Medication Sig Dispense Refill     [START ON 3/16/2023] amphetamine-dextroamphetamine (ADDERALL) 10 MG tablet Take 1 tablet by mouth daily. 30 tablet 0     [START ON 2/16/2023] amphetamine-dextroamphetamine (ADDERALL) 10 MG tablet Take 1 tablet by mouth daily. 30 tablet 0     amphetamine-dextroamphetamine (ADDERALL) 10 MG tablet Take 1 tablet by mouth daily. 30 tablet 0     albuterol (PROAIR HFA/PROVENTIL HFA/VENTOLIN HFA) 108 (90 Base) MCG/ACT inhaler Inhale 2 puffs into the lungs every 4 hours as needed for shortness of breath / dyspnea or wheezing 1 Inhaler 11     Cholecalciferol (VITAMIN D3) 1000 UNITS CAPS Take 1,000  Units by mouth       hydrOXYzine (VISTARIL) 25 MG capsule Take 25 mg by mouth 3 times daily as needed       NIFEdipine ER OSMOTIC (PROCARDIA XL) 30 MG 24 hr tablet Take 1 tablet (30 mg) by mouth daily 90 tablet 3     propranolol ER (INDERAL LA) 60 MG 24 hr capsule TAKE 1 CAPSULE BY MOUTH ONCE DAILY 90 capsule 3     pseudoePHEDrine (SUDAFED) 60 MG tablet Take 60 mg by mouth       sertraline (ZOLOFT) 50 MG tablet Take 25 mg by mouth daily x 7 days, then increase to 50 mg daily. 90 tablet 3     SUMAtriptan (IMITREX) 50 MG tablet Take 1 tablet (50 mg) by mouth at onset of headache for migraine May repeat after 2 hours as needed. Max dose 200 mg per 24 hours. 9 tablet 3     zolpidem (AMBIEN) 10 MG tablet Take 1 tablet (10 mg) by mouth nightly as needed 30 tablet 5     Allergies   Allergen Reactions     Cats Other (See Comments)     Swelling of face/eyes/throat     No Clinical Screening - See Comments Other (See Comments)     Trees - itchy  Swelling of face/eyes/throat  Trees - itchy     Seasonal Allergies Other (See Comments)     Trees - itchy     Trichophyton Other (See Comments)     Swelling of face/eyes/throat       Breast Cancer Screening:  Any new diagnosis of family breast, ovarian, or bowel cancer? No    FHS-7: No flowsheet data found.    Patient under 40 years of age: Routine Mammogram Screening not recommended.   Pertinent mammograms are reviewed under the imaging tab.    History of abnormal Pap smear: NO - age 21-29 PAP every 3 years recommended     Reviewed and updated as needed this visit by clinical staff   Tobacco  Allergies  Meds              Reviewed and updated as needed this visit by Provider                 Past Medical History:   Diagnosis Date     Convergence insufficiency     2012,as a result of TBI - had to wear prism glasses for about 1 year.     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     Migraine without status migrainosus, not intractable  "    No Comments Provided     Other specified anxiety disorders     No Comments Provided     Other specified behavioral and emotional disorders with onset usually occurring in childhood and adolescence     No Comments Provided      No past surgical history on file.    PHQ 10/8/2020 10/21/2021 1/18/2023   PHQ-9 Total Score 5 11 15   Q9: Thoughts of better off dead/self-harm past 2 weeks Not at all Not at all Not at all     HECTOR-7 SCORE 10/21/2021 6/24/2022 1/18/2023   Total Score 8 (mild anxiety) - 12 (moderate anxiety)   Total Score 8 12 12           Review of Systems   Constitutional: Negative for chills and fever.   HENT: Negative for congestion, ear pain, hearing loss and sore throat.    Eyes: Negative for pain and visual disturbance.   Respiratory: Negative for cough and shortness of breath.    Cardiovascular: Negative for chest pain, palpitations and peripheral edema.   Gastrointestinal: Positive for constipation, diarrhea and nausea. Negative for abdominal pain, heartburn and hematochezia.   Breasts:  Negative for tenderness, breast mass and discharge.   Genitourinary: Negative for dysuria, frequency, genital sores, hematuria, pelvic pain, urgency, vaginal bleeding and vaginal discharge.   Musculoskeletal: Negative for arthralgias, joint swelling and myalgias.   Skin: Negative for rash.   Neurological: Positive for headaches. Negative for dizziness, weakness and paresthesias.   Psychiatric/Behavioral: Negative for mood changes. The patient is nervous/anxious.           OBJECTIVE:   BP 98/78 (BP Location: Right arm, Patient Position: Sitting, Cuff Size: Adult Regular)   Pulse 87   Temp (!) 96.3  F (35.7  C) (Tympanic)   Resp 18   Ht 1.626 m (5' 4\")   Wt 60.2 kg (132 lb 12.8 oz)   LMP 01/05/2023 (Approximate)   SpO2 100%   BMI 22.80 kg/m    Physical Exam  Constitutional:       General: She is not in acute distress.     Appearance: She is well-developed.   HENT:      Head: Normocephalic.      Right Ear: " Tympanic membrane and external ear normal.      Left Ear: Tympanic membrane and external ear normal.      Nose: Nose normal.      Mouth/Throat:      Pharynx: No oropharyngeal exudate.   Eyes:      General:         Right eye: No discharge.         Left eye: No discharge.      Conjunctiva/sclera: Conjunctivae normal.      Pupils: Pupils are equal, round, and reactive to light.   Neck:      Thyroid: No thyromegaly.      Trachea: No tracheal deviation.   Cardiovascular:      Rate and Rhythm: Normal rate and regular rhythm.      Pulses: Normal pulses.      Heart sounds: Normal heart sounds, S1 normal and S2 normal. No murmur heard.    No friction rub. No gallop. No S3 or S4 sounds.   Pulmonary:      Effort: Pulmonary effort is normal. No respiratory distress.      Breath sounds: Normal breath sounds. No wheezing or rales.      Comments: Breast exam:  deferred  Abdominal:      General: Bowel sounds are normal. There is no distension.      Palpations: Abdomen is soft. There is no mass.      Tenderness: There is no abdominal tenderness.   Genitourinary:     Comments: Pelvic Exam:  Vulva: No external lesions, normal hair distribution, no adenopathy  Vagina: Moist, pink, no abnormal discharge, well rugated, no lesions  Cervix: Pap smear  obtained.  Cervix is parous, smooth, pink, no visible lesions  Uterus: Normal size, anteverted, non-tender, mobile  Ovaries: No mass, non-tender, mobile  Musculoskeletal:         General: Normal range of motion.      Cervical back: Neck supple.   Lymphadenopathy:      Cervical: No cervical adenopathy.   Skin:     General: Skin is warm and dry.      Findings: No rash.   Neurological:      Mental Status: She is alert and oriented to person, place, and time.      Motor: No abnormal muscle tone.      Deep Tendon Reflexes: Reflexes are normal and symmetric.   Psychiatric:         Thought Content: Thought content normal.         Judgment: Judgment normal.         Diagnostic Test Results:  Labs  reviewed in Epic    ASSESSMENT/PLAN:       ICD-10-CM    1. Health care maintenance  Z00.00       2. Cervical cancer screening  Z12.4 Pap Screen reflex to HPV if ASCUS - recommend age 25 - 29      3. Need for COVID-19 vaccine  Z23 COVID-19 VACCINE BIVALENT BOOSTER 12+ (PFIZER)      4. Seizures (H)  R56.9       5. Anxiety  F41.9 TSH Reflex GH     Vitamin D Total     TSH Reflex GH     Vitamin D Total      6. Attention deficit disorder, unspecified hyperactivity presence  F98.8 amphetamine-dextroamphetamine (ADDERALL) 10 MG tablet     amphetamine-dextroamphetamine (ADDERALL) 10 MG tablet     amphetamine-dextroamphetamine (ADDERALL) 10 MG tablet     Drug Confirmation Panel Urine with Creat     CANCELED: Drug Confirmation Panel Urine with Creat      7. Exercise-induced asthma  J45.990         1.  Pap updated today.  Tdap is up-to-date, last completed on 10/8/2020.  Flu shot last completed 9/23/2022.  COVID booster given today.  2.  See #1.  3.  See #1.  4.  She has not had any further seizures since the ER visit.  She is waiting to get into see a neurologist.  Not on any antiepileptic medication at this time.  5.  Stable.  Continues on sertraline.  TSH and vitamin D as above.  6.  Stable.  Adderall 10 mg #30x3 refilled today.  Urine drug screen and contract both updated today.   reviewed and appropriate.  She will be due for refill around 4/13/2023.  7.  Stable.  Asthma action plan updated today.    Patient has been advised of split billing requirements and indicates understanding: Yes      COUNSELING:  Reviewed preventive health counseling, as reflected in patient instructions       Regular exercise       Healthy diet/nutrition       Vision screening       Safe sex practices/STD prevention        She reports that she has never smoked. She has never used smokeless tobacco.      Cyn Avendaño MD  [facility]  Answers for HPI/ROS submitted by the patient on 1/18/2023  If you checked off any  problems, how difficult have these problems made it for you to do your work, take care of things at home, or get along with other people?: Somewhat difficult  PHQ9 TOTAL SCORE: 15  HECTOR 7 TOTAL SCORE: 12

## 2023-01-19 NOTE — NURSING NOTE
Chief Complaint   Patient presents with     Physical       Advanced Care Directive on file? No    Medication Reconciliation: complete    FOOD SECURITY SCREENING QUESTIONS  The next two questions are to help us understand your food security.  If you are feeling you need any assistance in this area, we have resources available to support you today.    Hunger Vital Signs:  Within the past 12 months we worried whether our food would run out before we got money to buy more. Never  Within the past 12 months the food we bought just didn't last and we didn't have money to get more. Never      Silvana Cantor RN 1/19/2023 1:03 PM

## 2023-01-19 NOTE — LETTER
My Asthma Action Plan    Name: George Mcknight   YOB: 1995  Date: 1/19/2023   My doctor: Cyn Avendaño MD   My clinic: [facility]        My Rescue Medicine:   Albuterol inhaler (Proair/Ventolin/Proventil HFA)  2-4 puffs EVERY 4 HOURS as needed. Use a spacer if recommended by your provider.   My Asthma Severity:   Intermittent / Exercise Induced  Know your asthma triggers:              GREEN ZONE   Good Control    I feel good    No cough or wheeze    Can work, sleep and play without asthma symptoms       Take your asthma control medicine every day.     1. If exercise triggers your asthma, take your rescue medication    15 minutes before exercise or sports, and    During exercise if you have asthma symptoms  2. Spacer to use with inhaler: If you have a spacer, make sure to use it with your inhaler             YELLOW ZONE Getting Worse  I have ANY of these:    I do not feel good    Cough or wheeze    Chest feels tight    Wake up at night   1. Keep taking your Green Zone medications  2. Start taking your rescue medicine:    every 20 minutes for up to 1 hour. Then every 4 hours for 24-48 hours.  3. If you stay in the Yellow Zone for more than 12-24 hours, contact your doctor.  4. If you do not return to the Green Zone in 12-24 hours or you get worse, start taking your oral steroid medicine if prescribed by your provider.           RED ZONE Medical Alert - Get Help  I have ANY of these:    I feel awful    Medicine is not helping    Breathing getting harder    Trouble walking or talking    Nose opens wide to breathe       1. Take your rescue medicine NOW  2. If your provider has prescribed an oral steroid medicine, start taking it NOW  3. Call your doctor NOW  4. If you are still in the Red Zone after 20 minutes and you have not reached your doctor:    Take your rescue medicine again and    Call 911 or go to the emergency room right away    See your regular doctor within 2 weeks  of an Emergency Room or Urgent Care visit for follow-up treatment.          Annual Reminders:  Meet with Asthma Educator,  Flu Shot in the Fall, consider Pneumonia Vaccination for patients with asthma (aged 19 and older).    Pharmacy: [facility] - [place], [place] - [facility] [address]    Electronically signed by Cyn Avendaño MD   Date: 01/19/23                    Asthma Triggers  How To Control Things That Make Your Asthma Worse    Triggers are things that make your asthma worse.  Look at the list below to help you find your triggers and   what you can do about them. You can help prevent asthma flare-ups by staying away from your triggers.      Trigger                                                          What you can do   Cigarette Smoke  Tobacco smoke can make asthma worse. Do not allow smoking in your home, car or around you.  Be sure no one smokes at a child s day care or school.  If you smoke, ask your health care provider for ways to help you quit.  Ask family members to quit too.  Ask your health care provider for a referral to Quit Plan to help you quit smoking, or call 6-263-912-PLAN.     Colds, Flu, Bronchitis  These are common triggers of asthma. Wash your hands often.  Don t touch your eyes, nose or mouth.  Get a flu shot every year.     Dust Mites  These are tiny bugs that live in cloth or carpet. They are too small to see. Wash sheets and blankets in hot water every week.   Encase pillows and mattress in dust mite proof covers.  Avoid having carpet if you can. If you have carpet, vacuum weekly.   Use a dust mask and HEPA vacuum.   Pollen and Outdoor Mold  Some people are allergic to trees, grass, or weed pollen, or molds. Try to keep your windows closed.  Limit time out doors when pollen count is high.   Ask you health care provider about taking medicine during allergy season.     Animal Dander  Some people are allergic to skin flakes, urine or saliva from pets with fur or  feathers. Keep pets with fur or feathers out of your home.    If you can t keep the pet outdoors, then keep the pet out of your bedroom.  Keep the bedroom door closed.  Keep pets off cloth furniture and away from stuffed toys.     Mice, Rats, and Cockroaches  Some people are allergic to the waste from these pests.   Cover food and garbage.  Clean up spills and food crumbs.  Store grease in the refrigerator.   Keep food out of the bedroom.   Indoor Mold  This can be a trigger if your home has high moisture. Fix leaking faucets, pipes, or other sources of water.   Clean moldy surfaces.  Dehumidify basement if it is damp and smelly.   Smoke, Strong Odors, and Sprays  These can reduce air quality. Stay away from strong odors and sprays, such as perfume, powder, hair spray, paints, smoke incense, paint, cleaning products, candles and new carpet.   Exercise or Sports  Some people with asthma have this trigger. Be active!  Ask your doctor about taking medicine before sports or exercise to prevent symptoms.    Warm up for 5-10 minutes before and after sports or exercise.     Other Triggers of Asthma  Cold air:  Cover your nose and mouth with a scarf.  Sometimes laughing or crying can be a trigger.  Some medicines and food can trigger asthma.

## 2023-01-19 NOTE — LETTER
[facility]  01/19/23  Patient: George Mcknight  YOB: 1995  Medical Record Number: [id number]                                                                                  Non-Opioid Controlled Substance Agreement    This is an agreement between you and your provider regarding safe and appropriate use of controlled substances prescribed by your care team. Controlled substances are?medicines that can cause physical and mental dependence (abuse).     There are strict laws about having and using these medicines. We here at [facility] are  committed to working with you in your efforts to get better. To support you in this work, we'll help you schedule regular office appointments for medicine refills. If we must cancel or change your appointment for any reason, we'll make sure you have enough medicine to last until your next appointment.     As a Provider, I will:     Listen carefully to your concerns while treating you with respect.     Recommend a treatment plan that I believe is in your best interest and may involve therapies other than medicine.      Talk with you often about the possible benefits and the risk of harm of any medicine that we prescribe for you.    Assess the safety of this medicine and check how well it works.      Provide a plan on how to taper (discontinue or go off) using this medicine if the decision is made to stop its use.      ::  As a Patient, I understand controlled substances:       Are prescribed by my care provider to help me function or work and manage my condition(s).?    Are strong medicines and can cause serious side effects.       Need to be taken exactly as prescribed.?Combining controlled substances with certain medicines or chemicals (such as illegal drugs, alcohol, sedatives, sleeping pills, and benzodiazepines) can be dangerous or even fatal.? If I stop taking my medicines suddenly, I may have severe withdrawal symptoms.     The risks,  benefits, and side effects of these medicine(s) were explained to me. I agree that:    1. I will take part in other treatments as advised by my care team. This may be psychiatry or counseling, physical therapy, behavioral therapy, group treatment or a referral to specialist.    2. I will keep all my appointments and understand this is part of the monitoring of controlled substances.?My care team may require an office visit for EVERY controlled substance refill. If I miss appointments or don t follow instructions, my care team may stop my medicine    3. I will take my medicines as prescribed. I will not change the dose or schedule unless my care team tells me to. There will be no refills if I run out early.      4. I may be asked to come to the clinic and complete a urine drug test or complete a pill count. If I don t give a urine sample or participate in a pill count, the care team may stop my medicine.    5. I will only receive controlled substance prescriptions from this clinic. If I am treated by another provider, I will tell them that I am taking controlled substances and that I have a treatment agreement with this provider. I will inform my [facility] care team within one business day if I am given a prescription for any controlled substance by another healthcare provider. My [facility] care team can contact other providers and pharmacists about my use of any medicines.    6. It is up to me to make sure that I don't run out of my medicines on weekends or holidays.?If my care team is willing to refill my prescription without a visit, I must request refills only during office hours. Refills may take up to 3 business days to process. I will use one pharmacy to fill all my controlled substance prescriptions. I will notify the clinic about any changes to my insurance or medicine availability.    7. I am responsible for my prescriptions. If the medicine/prescription is lost, stolen or destroyed, it will  not be replaced.?I also agree not to share controlled substance medicines with anyone.     8. I am aware I should not use any illegal or recreational drugs. I agree not to drink alcohol unless my care team says I can.     9. If I enroll in the [place] Medical Cannabis program, I will tell my care team before my next refill.    10. I will tell my care team right away if I become pregnant, have a new medical problem treated outside of my regular clinic, or have a change in my medicines.     11. I understand that this medicine can affect my thinking, judgment and reaction time.? Alcohol and drugs affect the brain and body, which can affect the safety of my driving. Being under the influence of alcohol or drugs can affect my decision-making, behaviors, personal safety and the safety of others. Driving while impaired (DWI) can occur if a person is driving, operating or in physical control of a car, motorcycle, boat, snowmobile, ATV, motorbike, off-road vehicle or any other motor vehicle ([place] Statute 169A.20). I understand the risk if I choose to drive or operate any vehicle or machinery.    I understand that if I do not follow any of the conditions above, my prescriptions or treatment may be stopped or changed.   I agree that my provider, clinic care team and pharmacy may work with any city, state or federal law enforcement agency that investigates the misuse, sale or other diversion of my controlled medicine. I will allow my provider to discuss my care with, or share a copy of, this agreement with any other treating provider, pharmacy or emergency room where I receive care.     I have read this agreement and have asked questions about anything I did not understand.    ________________________________________________________  Patient Signature - George Mcknight     ___________________                   Date     ________________________________________________________  Provider Signature - Cyn Avendaño MD        ___________________                   Date     ________________________________________________________  Witness Signature (required if provider not present while patient signing)          ___________________                   Date

## 2023-01-21 LAB — DEPRECATED CALCIDIOL+CALCIFEROL SERPL-MC: 21 UG/L (ref 20–75)

## 2023-01-23 LAB
BKR LAB AP GYN ADEQUACY: NORMAL
BKR LAB AP GYN INTERPRETATION: NORMAL
BKR LAB AP HPV REFLEX: NORMAL
BKR LAB AP PREVIOUS ABNORMAL: NORMAL
PATH REPORT.COMMENTS IMP SPEC: NORMAL
PATH REPORT.COMMENTS IMP SPEC: NORMAL
PATH REPORT.RELEVANT HX SPEC: NORMAL

## 2023-05-17 DIAGNOSIS — G43.809 OTHER MIGRAINE, NOT INTRACTABLE, WITHOUT STATUS MIGRAINOSUS: ICD-10-CM

## 2023-05-18 ENCOUNTER — MYC MEDICAL ADVICE (OUTPATIENT)
Dept: FAMILY MEDICINE | Facility: OTHER | Age: 28
End: 2023-05-18
Payer: COMMERCIAL

## 2023-05-18 DIAGNOSIS — F98.8 ATTENTION DEFICIT DISORDER, UNSPECIFIED HYPERACTIVITY PRESENCE: ICD-10-CM

## 2023-05-19 RX ORDER — SUMATRIPTAN 50 MG/1
50 TABLET, FILM COATED ORAL
Qty: 9 TABLET | Refills: 3 | Status: SHIPPED | OUTPATIENT
Start: 2023-05-19 | End: 2023-08-10

## 2023-05-19 RX ORDER — DEXTROAMPHETAMINE SACCHARATE, AMPHETAMINE ASPARTATE, DEXTROAMPHETAMINE SULFATE AND AMPHETAMINE SULFATE 2.5; 2.5; 2.5; 2.5 MG/1; MG/1; MG/1; MG/1
TABLET ORAL
Qty: 30 TABLET | Refills: 0 | OUTPATIENT
Start: 2023-05-19

## 2023-05-19 RX ORDER — DEXTROAMPHETAMINE SACCHARATE, AMPHETAMINE ASPARTATE MONOHYDRATE, DEXTROAMPHETAMINE SULFATE AND AMPHETAMINE SULFATE 6.25; 6.25; 6.25; 6.25 MG/1; MG/1; MG/1; MG/1
25 CAPSULE, EXTENDED RELEASE ORAL EVERY MORNING
Qty: 30 CAPSULE | Refills: 0 | OUTPATIENT
Start: 2023-05-19

## 2023-05-19 NOTE — TELEPHONE ENCOUNTER
Since this is a controlled substance, she will need an appointment scheduled to make any changes/refills.  Virtual/phone visit ok since her urine drug screen and contract are up to date.  Cyn Avendaño MD

## 2023-05-19 NOTE — TELEPHONE ENCOUNTER
[facility]  sent Rx request for the following:      Requested Prescriptions   Pending Prescriptions Disp Refills     SUMAtriptan (IMITREX) 50 MG tablet [Pharmacy Med Name: sumatriptan 50 mg tablet] 9 tablet 3     Sig: Take 1 tablet (50 mg) by mouth at onset of headache for migraine May repeat after 2 hours as needed. Max dose 200 mg per 24 hours.       Serotonin Agonists Failed - 5/17/2023  9:14 AM        Failed - Serotonin Agonist request needs review.     Please review patient's record. If patient has had 8 or more treatments in the past month, please forward to provider.     Last Prescription Date:   11/22/22  Last Fill Qty/Refills:         09, R-03  Last Office Visit:              01/19/23   Future Office visit:           None    Roxy Ramos RN on 5/19/2023 at 11:44 AM

## 2023-05-19 NOTE — TELEPHONE ENCOUNTER
"Patient had requested a decrease dose of Adderall on 10/8/20 office visit as she had completed school and felt this would be beneficial.     She has been taking 10mg daily since then and she has requested at this time to change back to previous regimen of 25 mg XR every morning and 10 mg every evening as needed as this has \"not really been effective\". Information routed to provider for review.    Elena Crespo RN on 5/19/2023 at 10:19 AM    "

## 2023-05-22 RX ORDER — DEXTROAMPHETAMINE SACCHARATE, AMPHETAMINE ASPARTATE, DEXTROAMPHETAMINE SULFATE AND AMPHETAMINE SULFATE 2.5; 2.5; 2.5; 2.5 MG/1; MG/1; MG/1; MG/1
TABLET ORAL
Qty: 30 TABLET | Refills: 0 | OUTPATIENT
Start: 2023-05-22

## 2023-05-22 NOTE — TELEPHONE ENCOUNTER
Refill request will be addressed at appointment on 5/26/23    Lizet Aguilar RN on 5/22/2023 at 3:45 PM

## 2023-05-25 ASSESSMENT — ASTHMA QUESTIONNAIRES
QUESTION_1 LAST FOUR WEEKS HOW MUCH OF THE TIME DID YOUR ASTHMA KEEP YOU FROM GETTING AS MUCH DONE AT WORK, SCHOOL OR AT HOME: NONE OF THE TIME
QUESTION_4 LAST FOUR WEEKS HOW OFTEN HAVE YOU USED YOUR RESCUE INHALER OR NEBULIZER MEDICATION (SUCH AS ALBUTEROL): ONCE A WEEK OR LESS
ACT_TOTALSCORE: 23
ACT_TOTALSCORE: 23
QUESTION_3 LAST FOUR WEEKS HOW OFTEN DID YOUR ASTHMA SYMPTOMS (WHEEZING, COUGHING, SHORTNESS OF BREATH, CHEST TIGHTNESS OR PAIN) WAKE YOU UP AT NIGHT OR EARLIER THAN USUAL IN THE MORNING: NOT AT ALL
QUESTION_2 LAST FOUR WEEKS HOW OFTEN HAVE YOU HAD SHORTNESS OF BREATH: ONCE OR TWICE A WEEK
QUESTION_5 LAST FOUR WEEKS HOW WOULD YOU RATE YOUR ASTHMA CONTROL: COMPLETELY CONTROLLED

## 2023-05-25 ASSESSMENT — ANXIETY QUESTIONNAIRES
3. WORRYING TOO MUCH ABOUT DIFFERENT THINGS: MORE THAN HALF THE DAYS
2. NOT BEING ABLE TO STOP OR CONTROL WORRYING: MORE THAN HALF THE DAYS
8. IF YOU CHECKED OFF ANY PROBLEMS, HOW DIFFICULT HAVE THESE MADE IT FOR YOU TO DO YOUR WORK, TAKE CARE OF THINGS AT HOME, OR GET ALONG WITH OTHER PEOPLE?: SOMEWHAT DIFFICULT
GAD7 TOTAL SCORE: 10
6. BECOMING EASILY ANNOYED OR IRRITABLE: SEVERAL DAYS
IF YOU CHECKED OFF ANY PROBLEMS ON THIS QUESTIONNAIRE, HOW DIFFICULT HAVE THESE PROBLEMS MADE IT FOR YOU TO DO YOUR WORK, TAKE CARE OF THINGS AT HOME, OR GET ALONG WITH OTHER PEOPLE: SOMEWHAT DIFFICULT
4. TROUBLE RELAXING: SEVERAL DAYS
7. FEELING AFRAID AS IF SOMETHING AWFUL MIGHT HAPPEN: SEVERAL DAYS
1. FEELING NERVOUS, ANXIOUS, OR ON EDGE: MORE THAN HALF THE DAYS
7. FEELING AFRAID AS IF SOMETHING AWFUL MIGHT HAPPEN: SEVERAL DAYS
5. BEING SO RESTLESS THAT IT IS HARD TO SIT STILL: SEVERAL DAYS
GAD7 TOTAL SCORE: 10
GAD7 TOTAL SCORE: 10

## 2023-05-25 ASSESSMENT — PATIENT HEALTH QUESTIONNAIRE - PHQ9
SUM OF ALL RESPONSES TO PHQ QUESTIONS 1-9: 14
10. IF YOU CHECKED OFF ANY PROBLEMS, HOW DIFFICULT HAVE THESE PROBLEMS MADE IT FOR YOU TO DO YOUR WORK, TAKE CARE OF THINGS AT HOME, OR GET ALONG WITH OTHER PEOPLE: VERY DIFFICULT
SUM OF ALL RESPONSES TO PHQ QUESTIONS 1-9: 14

## 2023-05-26 ENCOUNTER — VIRTUAL VISIT (OUTPATIENT)
Dept: FAMILY MEDICINE | Facility: OTHER | Age: 28
End: 2023-05-26
Attending: PHYSICIAN ASSISTANT
Payer: COMMERCIAL

## 2023-05-26 DIAGNOSIS — F41.8 DEPRESSION WITH ANXIETY: ICD-10-CM

## 2023-05-26 DIAGNOSIS — F98.8 ATTENTION DEFICIT DISORDER, UNSPECIFIED HYPERACTIVITY PRESENCE: Primary | ICD-10-CM

## 2023-05-26 PROCEDURE — 99212 OFFICE O/P EST SF 10 MIN: CPT | Mod: 93 | Performed by: PHYSICIAN ASSISTANT

## 2023-05-26 RX ORDER — DEXTROAMPHETAMINE SACCHARATE, AMPHETAMINE ASPARTATE MONOHYDRATE, DEXTROAMPHETAMINE SULFATE AND AMPHETAMINE SULFATE 3.75; 3.75; 3.75; 3.75 MG/1; MG/1; MG/1; MG/1
15 CAPSULE, EXTENDED RELEASE ORAL EVERY MORNING
Qty: 30 CAPSULE | Refills: 0 | Status: SHIPPED | OUTPATIENT
Start: 2023-06-26 | End: 2023-07-26

## 2023-05-26 RX ORDER — DEXTROAMPHETAMINE SACCHARATE, AMPHETAMINE ASPARTATE, DEXTROAMPHETAMINE SULFATE AND AMPHETAMINE SULFATE 2.5; 2.5; 2.5; 2.5 MG/1; MG/1; MG/1; MG/1
TABLET ORAL
Qty: 30 TABLET | Refills: 0 | Status: SHIPPED | OUTPATIENT
Start: 2023-06-26 | End: 2023-11-02

## 2023-05-26 RX ORDER — DEXTROAMPHETAMINE SACCHARATE, AMPHETAMINE ASPARTATE, DEXTROAMPHETAMINE SULFATE AND AMPHETAMINE SULFATE 2.5; 2.5; 2.5; 2.5 MG/1; MG/1; MG/1; MG/1
TABLET ORAL
Qty: 30 TABLET | Refills: 0 | Status: SHIPPED | OUTPATIENT
Start: 2023-05-26 | End: 2023-11-02

## 2023-05-26 RX ORDER — DEXTROAMPHETAMINE SACCHARATE, AMPHETAMINE ASPARTATE MONOHYDRATE, DEXTROAMPHETAMINE SULFATE AND AMPHETAMINE SULFATE 3.75; 3.75; 3.75; 3.75 MG/1; MG/1; MG/1; MG/1
15 CAPSULE, EXTENDED RELEASE ORAL EVERY MORNING
Qty: 30 CAPSULE | Refills: 0 | Status: SHIPPED | OUTPATIENT
Start: 2023-05-26 | End: 2023-06-25

## 2023-05-26 ASSESSMENT — PATIENT HEALTH QUESTIONNAIRE - PHQ9
10. IF YOU CHECKED OFF ANY PROBLEMS, HOW DIFFICULT HAVE THESE PROBLEMS MADE IT FOR YOU TO DO YOUR WORK, TAKE CARE OF THINGS AT HOME, OR GET ALONG WITH OTHER PEOPLE: VERY DIFFICULT
SUM OF ALL RESPONSES TO PHQ QUESTIONS 1-9: 14

## 2023-05-26 ASSESSMENT — ANXIETY QUESTIONNAIRES: GAD7 TOTAL SCORE: 10

## 2023-05-26 NOTE — PROGRESS NOTES
George is a 27 year old who is being evaluated via a billable telephone visit.      What phone number would you like to be contacted at? [phone]  How would you like to obtain your AVS? Yared  Distant Location (provider location):  On-site    Assessment & Plan   Problem List Items Addressed This Visit        Behavioral    ADD (attention deficit disorder) - Primary    Relevant Medications    amphetamine-dextroamphetamine (ADDERALL XR) 15 MG 24 hr capsule    amphetamine-dextroamphetamine (ADDERALL XR) 15 MG 24 hr capsule (Start on 6/26/2023)    amphetamine-dextroamphetamine (ADDERALL) 10 MG tablet (Start on 6/26/2023)    amphetamine-dextroamphetamine (ADDERALL) 10 MG tablet    Depression with anxiety    Relevant Medications    sertraline (ZOLOFT) 50 MG tablet        Discussed symptoms and concerns at length.  We will wean up on the dose of Adderall.  Refilled Adderall XR 15 mg every morning with a 10 mg immediate release in the afternoon.  Encourage close follow-up with her PCP in clinic in 1 to 2 months for monitoring.  Gave side effect profile.  No acute anxiety or depression concerns at this time.  Continue anxiety depression medications as previously prescribed.  Discussed symptoms and concerns at length.      Recheck with PCP in clinic in 2 months.      Patient was given the side effect profile and the safety concerns with using the controlled medication prescription.   UDS: 1/19/2023  CSA: 1/19/2023  Patient should not share the controlled medication with other people.    website was reviewed.     PDMP Review       Value Time User    State PDMP site checked  Yes 5/26/2023 11:19 AM Lucila Gonzalez PA-C            Prescription drug management         Depression Screening Follow Up        5/25/2023    12:46 PM   PHQ   PHQ-9 Total Score 14   Q9: Thoughts of better off dead/self-harm past 2 weeks Not at all         5/25/2023    12:46 PM   Last PHQ-9   1.  Little interest or pleasure in doing things 1    2.  Feeling down, depressed, or hopeless 1   3.  Trouble falling or staying asleep, or sleeping too much 3   4.  Feeling tired or having little energy 3   5.  Poor appetite or overeating 1   6.  Feeling bad about yourself 1   7.  Trouble concentrating 3   8.  Moving slowly or restless 1   Q9: Thoughts of better off dead/self-harm past 2 weeks 0   PHQ-9 Total Score 14       Follow Up Actions Taken  Patient counseled, no additional follow up at this time.     See Patient Instructions    Return in about 2 months (around 7/26/2023) for Recheck with PCP in clinic.    Lucila Gonzalez PA-C  [facility]    Momo Vazquez is a 27 year old, presenting for the following health issues:  Recheck Medication (adderrall)      History of Present Illness       Reason for visit:  Adderall adjustment and refill    She eats 2-3 servings of fruits and vegetables daily.She consumes 2 sweetened beverage(s) daily.She exercises with enough effort to increase her heart rate 30 to 60 minutes per day.  She exercises with enough effort to increase her heart rate 4 days per week. She is missing 2 dose(s) of medications per week.  She is not taking prescribed medications regularly due to remembering to take.    Today's PHQ-9         PHQ-9 Total Score: 14    PHQ-9 Q9 Thoughts of better off dead/self-harm past 2 weeks :   Not at all    How difficult have these problems made it for you to do your work, take care of things at home, or get along with other people: Very difficult  Today's HECTOR-7 Score: 10       Medication Followup of adderral    Taking Medication as prescribed: yes    Side Effects:  None    Medication Helping Symptoms:  yes    Patient is coming in today for ADHD recheck.  Reviewed note with PCP which approved a telephone recheck appointment today for ADHD medication.  States that she has been doing fine however she is concerned that her current regimen is not working as well as it used to.  No side effects noted  with the medication.  Previously was on extended release in the morning and immediate release in the afternoon.  Wondering if she can go back to her old regimen to see if this better controls her ADHD symptoms.  Having hard time focusing and concentrating.  Unable to finish task.  No stomach upset or side effects.  No change in appetite.  No increase in depression or anxiety.  Currently on Zoloft 50 mg daily.  Takes hydroxyzine minimally as needed for anxiety flares.  Also has Ambien that she uses at bedtime as needed however she takes this only a few times weekly depending on her work schedule.  Tries to avoid as needed medications if possible.  Has enough refills of Zoloft.  Previous ADHD regimentation was Adderall extended release 25 mg in the morning and immediate release 10 mg in the afternoon.  Did not have side effects with this dose of medication.  Tolerated the regimen well.    Review of Systems   Constitutional, HEENT, cardiovascular, pulmonary, gi and gu systems are negative, except as otherwise noted.      Objective    Vitals - Patient Reported  Pain Score: No Pain (0)    Physical Exam   healthy, alert and no distress  PSYCH: Alert and oriented times 3; coherent speech, normal   rate and volume, able to articulate logical thoughts, able   to abstract reason, no tangential thoughts, no hallucinations   or delusions  Her affect is normal  RESP: No cough, no audible wheezing, able to talk in full sentences  Remainder of exam unable to be completed due to telephone visits          Phone call duration: 10 minutes

## 2023-05-26 NOTE — PATIENT INSTRUCTIONS
Change medication doses to Adderall extended release 15 mg in the morning and 10 mg Adderall immediate release in the afternoon as needed.  Recheck with primary care provider in 2 months for medication monitoring in person.

## 2023-05-26 NOTE — NURSING NOTE
Pt presents to clinic today for a refill and med check for adderral.       FOOD SECURITY SCREENING QUESTIONS:    The next two questions are to help us understand your food security.  If you are feeling you need any assistance in this area, we have resources available to support you today.    Hunger Vital Signs:  Within the past 12 months we worried whether our food would run out before we got money to buy more. Never  Within the past 12 months the food we bought just didn't last and we didn't have money to get more. Never            Medication Reconciliation: complete  Alba Salinas LPN,LPN on 5/26/2023 at 11:09 AM

## 2023-06-23 ENCOUNTER — MYC MEDICAL ADVICE (OUTPATIENT)
Dept: FAMILY MEDICINE | Facility: OTHER | Age: 28
End: 2023-06-23
Payer: COMMERCIAL

## 2023-06-30 DIAGNOSIS — F41.8 DEPRESSION WITH ANXIETY: ICD-10-CM

## 2023-07-04 ENCOUNTER — TELEPHONE (OUTPATIENT)
Dept: EMERGENCY MEDICINE | Facility: OTHER | Age: 28
End: 2023-07-04
Payer: COMMERCIAL

## 2023-07-04 DIAGNOSIS — A07.1 GIARDIA: Primary | ICD-10-CM

## 2023-07-04 RX ORDER — TINIDAZOLE 500 MG/1
1000 TABLET ORAL ONCE
Qty: 2 TABLET | Refills: 0 | Status: SHIPPED | OUTPATIENT
Start: 2023-07-04 | End: 2023-07-04

## 2023-07-04 RX ORDER — ONDANSETRON 4 MG/1
4 TABLET, ORALLY DISINTEGRATING ORAL EVERY 8 HOURS PRN
Qty: 30 TABLET | Refills: 0 | Status: SHIPPED | OUTPATIENT
Start: 2023-07-04

## 2023-07-04 NOTE — TELEPHONE ENCOUNTER
Call from patient. Diarrhea since hiking in [place]. Worried about giardia. meds called in. She will return for stool study if not improving.

## 2023-07-05 NOTE — TELEPHONE ENCOUNTER
Routing refill request to provider for review/approval because:    LOV: 1/19/23     Sertraline noted as historical.    Lizet Aguilar RN on 7/5/2023 at 3:14 PM

## 2023-08-10 ENCOUNTER — OFFICE VISIT (OUTPATIENT)
Dept: FAMILY MEDICINE | Facility: OTHER | Age: 28
End: 2023-08-10
Attending: FAMILY MEDICINE
Payer: COMMERCIAL

## 2023-08-10 VITALS
SYSTOLIC BLOOD PRESSURE: 110 MMHG | OXYGEN SATURATION: 96 % | DIASTOLIC BLOOD PRESSURE: 72 MMHG | TEMPERATURE: 97.4 F | BODY MASS INDEX: 22.01 KG/M2 | WEIGHT: 128.2 LBS | HEART RATE: 108 BPM | RESPIRATION RATE: 18 BRPM

## 2023-08-10 DIAGNOSIS — G43.809 OTHER MIGRAINE, NOT INTRACTABLE, WITHOUT STATUS MIGRAINOSUS: ICD-10-CM

## 2023-08-10 DIAGNOSIS — I73.00 RAYNAUD'S DISEASE WITHOUT GANGRENE: ICD-10-CM

## 2023-08-10 DIAGNOSIS — F41.8 DEPRESSION WITH ANXIETY: ICD-10-CM

## 2023-08-10 DIAGNOSIS — F98.8 ATTENTION DEFICIT DISORDER, UNSPECIFIED HYPERACTIVITY PRESENCE: Primary | ICD-10-CM

## 2023-08-10 DIAGNOSIS — G43.809 OTHER MIGRAINE WITHOUT STATUS MIGRAINOSUS, NOT INTRACTABLE: ICD-10-CM

## 2023-08-10 DIAGNOSIS — J45.990 EXERCISE-INDUCED ASTHMA: ICD-10-CM

## 2023-08-10 LAB — CREAT UR-MCNC: 189 MG/DL

## 2023-08-10 PROCEDURE — 80355 GABAPENTIN NON-BLOOD: CPT | Mod: ZL | Performed by: FAMILY MEDICINE

## 2023-08-10 PROCEDURE — 83992 ASSAY FOR PHENCYCLIDINE: CPT | Mod: ZL | Performed by: FAMILY MEDICINE

## 2023-08-10 PROCEDURE — 99213 OFFICE O/P EST LOW 20 MIN: CPT | Performed by: FAMILY MEDICINE

## 2023-08-10 RX ORDER — DEXTROAMPHETAMINE SACCHARATE, AMPHETAMINE ASPARTATE MONOHYDRATE, DEXTROAMPHETAMINE SULFATE AND AMPHETAMINE SULFATE 5; 5; 5; 5 MG/1; MG/1; MG/1; MG/1
20 CAPSULE, EXTENDED RELEASE ORAL DAILY
Qty: 30 CAPSULE | Refills: 0 | Status: SHIPPED | OUTPATIENT
Start: 2023-09-07 | End: 2023-11-02

## 2023-08-10 RX ORDER — HYDROXYZINE PAMOATE 25 MG/1
25 CAPSULE ORAL 3 TIMES DAILY PRN
Qty: 90 CAPSULE | Refills: 11 | Status: SHIPPED | OUTPATIENT
Start: 2023-08-10

## 2023-08-10 RX ORDER — DEXTROAMPHETAMINE SACCHARATE, AMPHETAMINE ASPARTATE, DEXTROAMPHETAMINE SULFATE AND AMPHETAMINE SULFATE 2.5; 2.5; 2.5; 2.5 MG/1; MG/1; MG/1; MG/1
10 TABLET ORAL DAILY
Qty: 30 TABLET | Refills: 0 | Status: CANCELLED | OUTPATIENT
Start: 2023-10-05

## 2023-08-10 RX ORDER — DEXTROAMPHETAMINE SACCHARATE, AMPHETAMINE ASPARTATE, DEXTROAMPHETAMINE SULFATE AND AMPHETAMINE SULFATE 2.5; 2.5; 2.5; 2.5 MG/1; MG/1; MG/1; MG/1
10 TABLET ORAL DAILY
Qty: 30 TABLET | Refills: 0 | Status: CANCELLED | OUTPATIENT
Start: 2023-09-07

## 2023-08-10 RX ORDER — DEXTROAMPHETAMINE SACCHARATE, AMPHETAMINE ASPARTATE MONOHYDRATE, DEXTROAMPHETAMINE SULFATE AND AMPHETAMINE SULFATE 5; 5; 5; 5 MG/1; MG/1; MG/1; MG/1
20 CAPSULE, EXTENDED RELEASE ORAL DAILY
Qty: 30 CAPSULE | Refills: 0 | Status: SHIPPED | OUTPATIENT
Start: 2023-08-10 | End: 2023-11-02

## 2023-08-10 RX ORDER — DEXTROAMPHETAMINE SACCHARATE, AMPHETAMINE ASPARTATE MONOHYDRATE, DEXTROAMPHETAMINE SULFATE AND AMPHETAMINE SULFATE 5; 5; 5; 5 MG/1; MG/1; MG/1; MG/1
20 CAPSULE, EXTENDED RELEASE ORAL DAILY
Qty: 30 CAPSULE | Refills: 0 | Status: SHIPPED | OUTPATIENT
Start: 2023-10-05 | End: 2023-11-02

## 2023-08-10 RX ORDER — DEXTROAMPHETAMINE SACCHARATE, AMPHETAMINE ASPARTATE, DEXTROAMPHETAMINE SULFATE AND AMPHETAMINE SULFATE 2.5; 2.5; 2.5; 2.5 MG/1; MG/1; MG/1; MG/1
TABLET ORAL
Qty: 30 TABLET | Refills: 0 | Status: CANCELLED | OUTPATIENT
Start: 2023-08-10

## 2023-08-10 RX ORDER — NIFEDIPINE 30 MG/1
30 TABLET, EXTENDED RELEASE ORAL DAILY
Qty: 90 TABLET | Refills: 3 | Status: SHIPPED | OUTPATIENT
Start: 2023-08-10

## 2023-08-10 RX ORDER — SUMATRIPTAN 50 MG/1
50 TABLET, FILM COATED ORAL
Qty: 9 TABLET | Refills: 11 | Status: SHIPPED | OUTPATIENT
Start: 2023-08-10

## 2023-08-10 RX ORDER — PROPRANOLOL HCL 60 MG
60 CAPSULE, EXTENDED RELEASE 24HR ORAL DAILY
Qty: 90 CAPSULE | Refills: 3 | Status: SHIPPED | OUTPATIENT
Start: 2023-08-10

## 2023-08-10 RX ORDER — ALBUTEROL SULFATE 90 UG/1
2 AEROSOL, METERED RESPIRATORY (INHALATION) EVERY 4 HOURS PRN
Qty: 18 G | Refills: 11 | Status: SHIPPED | OUTPATIENT
Start: 2023-08-10

## 2023-08-10 ASSESSMENT — PAIN SCALES - GENERAL: PAINLEVEL: NO PAIN (0)

## 2023-08-10 NOTE — NURSING NOTE
Chief Complaint   Patient presents with    Recheck Medication         Medication Reconciliation: complete    Irene Lyons, LPN

## 2023-08-10 NOTE — PROGRESS NOTES
Nursing Notes:   Irene Lyons LPN  8/10/2023  1:52 PM  Signed  Chief Complaint   Patient presents with    Recheck Medication         Medication Reconciliation: complete    Irene Lyons LPN          Momo Vazquez is a 27 year old, presenting for the following health issues:  Recheck Medication        8/10/2023     1:51 PM   Additional Questions   Roomed by Irene Lyons       George is in need of refills of her adderall for ADHD.  She had seen Lucila DIGGS for her last refill.  She had felt that her IR adderall was not lasting as long as it should and had tried a one time fill of Adderall XR 15 mg daily.  She does feel that it worked better, but felt that it still should be increased slightly and would like to try 20 mg daily.  She feels that her focus is not as good as it could be.    She is also in need of refills of some other medications.    She is working as a traveling RN.  She will be starting a contract in [place], [place] soon.  She will be staying with a family friend.    History of Present Illness       Reason for visit:  ADHD    She eats 2-3 servings of fruits and vegetables daily.She consumes 1 sweetened beverage(s) daily.She exercises with enough effort to increase her heart rate 30 to 60 minutes per day.  She exercises with enough effort to increase her heart rate 4 days per week. She is missing 2 dose(s) of medications per week.  She is not taking prescribed medications regularly due to remembering to take.       Med Check         Review of Systems   Constitutional, HEENT, cardiovascular, pulmonary, GI, , musculoskeletal, neuro, skin, endocrine and psych systems are negative, except as otherwise noted.      Objective    /72   Pulse 108   Temp 97.4  F (36.3  C) (Tympanic)   Resp 18   Wt 58.2 kg (128 lb 3.2 oz)   LMP 08/08/2023   SpO2 96%   Breastfeeding No   BMI 22.01 kg/m    Body mass index is 22.01 kg/m .  Physical Exam  Constitutional:       Appearance: Normal appearance.    HENT:      Head: Normocephalic.   Eyes:      Extraocular Movements: Extraocular movements intact.      Pupils: Pupils are equal, round, and reactive to light.   Cardiovascular:      Rate and Rhythm: Normal rate and regular rhythm.      Heart sounds: Normal heart sounds. No murmur heard.  Pulmonary:      Effort: Pulmonary effort is normal.      Breath sounds: Normal breath sounds. No wheezing, rhonchi or rales.   Musculoskeletal:      Cervical back: Normal range of motion and neck supple.   Neurological:      Mental Status: She is alert.   Psychiatric:         Mood and Affect: Mood normal.         Behavior: Behavior normal.          Assessment & Plan     ICD-10-CM    1. Attention deficit disorder, unspecified hyperactivity presence  F98.8 amphetamine-dextroamphetamine (ADDERALL XR) 20 MG 24 hr capsule     amphetamine-dextroamphetamine (ADDERALL XR) 20 MG 24 hr capsule     amphetamine-dextroamphetamine (ADDERALL XR) 20 MG 24 hr capsule     Drug Confirmation Panel Urine with Creat     Drug Confirmation Panel Urine with Creat      2. Raynaud's disease without gangrene  I73.00 NIFEdipine ER OSMOTIC (PROCARDIA XL) 30 MG 24 hr tablet      3. Other migraine without status migrainosus, not intractable  G43.809 propranolol ER (INDERAL LA) 60 MG 24 hr capsule      4. Depression with anxiety  F41.8 hydrOXYzine (VISTARIL) 25 MG capsule     sertraline (ZOLOFT) 50 MG tablet      5. Other migraine, not intractable, without status migrainosus  G43.809 SUMAtriptan (IMITREX) 50 MG tablet      6. Exercise-induced asthma  J45.990 albuterol (PROAIR HFA/PROVENTIL HFA/VENTOLIN HFA) 108 (90 Base) MCG/ACT inhaler          reviewed and is appropriate.  Urine drug screen updated today as she will be going off of her parents' insurance next month and wanted to not have to do this later when she might not have as good of coverage.  Contract is up to date (1/19/23).  Stable.  Nifedipine refilled.  Stable.  Propranolol refilled.  Stable.   Hydroxyzine and sertraline refilled as above.  Sumatriptan refilled.  Stable.  Albuterol refilled.    Return in about 12 weeks (around 11/2/2023) for med refill - telephone or virtual appt would be ok..    Cyn Avendaño MD  [facility]

## 2023-08-16 LAB
AMPHET UR CFM-MCNC: 980 NG/ML
AMPHET/CREAT UR: 519 NG/MG {CREAT}

## 2023-08-21 ENCOUNTER — HOSPITAL ENCOUNTER (OUTPATIENT)
Dept: GENERAL RADIOLOGY | Facility: OTHER | Age: 28
Discharge: HOME | End: 2023-08-21
Attending: STUDENT IN AN ORGANIZED HEALTH CARE EDUCATION/TRAINING PROGRAM
Payer: COMMERCIAL

## 2023-08-21 ENCOUNTER — OFFICE VISIT (OUTPATIENT)
Dept: FAMILY MEDICINE | Facility: OTHER | Age: 28
End: 2023-08-21
Attending: STUDENT IN AN ORGANIZED HEALTH CARE EDUCATION/TRAINING PROGRAM
Payer: COMMERCIAL

## 2023-08-21 VITALS
RESPIRATION RATE: 16 BRPM | WEIGHT: 131.13 LBS | BODY MASS INDEX: 22.39 KG/M2 | HEART RATE: 63 BPM | HEIGHT: 64 IN | OXYGEN SATURATION: 97 % | SYSTOLIC BLOOD PRESSURE: 126 MMHG | DIASTOLIC BLOOD PRESSURE: 70 MMHG | TEMPERATURE: 97.3 F

## 2023-08-21 DIAGNOSIS — S89.92XA KNEE INJURY, LEFT, INITIAL ENCOUNTER: ICD-10-CM

## 2023-08-21 DIAGNOSIS — S89.92XA KNEE INJURY, LEFT, INITIAL ENCOUNTER: Primary | ICD-10-CM

## 2023-08-21 DIAGNOSIS — S83.412A TEAR OF MCL (MEDIAL COLLATERAL LIGAMENT) OF KNEE, LEFT, INITIAL ENCOUNTER: ICD-10-CM

## 2023-08-21 PROCEDURE — 73562 X-RAY EXAM OF KNEE 3: CPT | Mod: LT

## 2023-08-21 PROCEDURE — 99213 OFFICE O/P EST LOW 20 MIN: CPT | Performed by: STUDENT IN AN ORGANIZED HEALTH CARE EDUCATION/TRAINING PROGRAM

## 2023-08-21 RX ORDER — CYCLOBENZAPRINE HCL 5 MG
5 TABLET ORAL 3 TIMES DAILY PRN
Qty: 30 TABLET | Refills: 3 | Status: SHIPPED | OUTPATIENT
Start: 2023-08-21

## 2023-08-21 ASSESSMENT — ANXIETY QUESTIONNAIRES
2. NOT BEING ABLE TO STOP OR CONTROL WORRYING: SEVERAL DAYS
7. FEELING AFRAID AS IF SOMETHING AWFUL MIGHT HAPPEN: SEVERAL DAYS
GAD7 TOTAL SCORE: 7
4. TROUBLE RELAXING: SEVERAL DAYS
5. BEING SO RESTLESS THAT IT IS HARD TO SIT STILL: SEVERAL DAYS
1. FEELING NERVOUS, ANXIOUS, OR ON EDGE: SEVERAL DAYS
6. BECOMING EASILY ANNOYED OR IRRITABLE: SEVERAL DAYS
3. WORRYING TOO MUCH ABOUT DIFFERENT THINGS: SEVERAL DAYS
GAD7 TOTAL SCORE: 7
IF YOU CHECKED OFF ANY PROBLEMS ON THIS QUESTIONNAIRE, HOW DIFFICULT HAVE THESE PROBLEMS MADE IT FOR YOU TO DO YOUR WORK, TAKE CARE OF THINGS AT HOME, OR GET ALONG WITH OTHER PEOPLE: SOMEWHAT DIFFICULT

## 2023-08-21 ASSESSMENT — PATIENT HEALTH QUESTIONNAIRE - PHQ9
10. IF YOU CHECKED OFF ANY PROBLEMS, HOW DIFFICULT HAVE THESE PROBLEMS MADE IT FOR YOU TO DO YOUR WORK, TAKE CARE OF THINGS AT HOME, OR GET ALONG WITH OTHER PEOPLE: SOMEWHAT DIFFICULT
SUM OF ALL RESPONSES TO PHQ QUESTIONS 1-9: 7
SUM OF ALL RESPONSES TO PHQ QUESTIONS 1-9: 7

## 2023-08-21 ASSESSMENT — PAIN SCALES - GENERAL: PAINLEVEL: SEVERE PAIN (6)

## 2023-08-21 NOTE — PROGRESS NOTES
Assessment & Plan   Problem List Items Addressed This Visit    None  Visit Diagnoses       Knee injury, left, initial encounter    -  Primary    Relevant Medications    cyclobenzaprine (FLEXERIL) 5 MG tablet    Other Relevant Orders    Knee Supplies Order Hinged Knee Brace; Left    MR Knee Left w/o Contrast    Crutches Order           Concern for meniscal vs ligament tear. Given hinged knee brace and crutches, mri ordered. Xray negative for acute fracture. Continue supportive cares, Rx for flexeril.                  No follow-ups on file.    Aury Carter MD  [facility]    Momo Vazquez is a 27 year old, presenting for the following health issues:  Fall (Left knee pain rated 6 with swelling x 2 days)      Fall    History of Present Illness       Reason for visit:  Left knee injury  Symptom onset:  1-3 days ago  Symptoms include:  Pain, swelling, inability to bear full weight. Increased sharp pain with flexion, extension, and medial and lateral rotations of my knee  Symptom intensity:  Moderate  Had these symptoms before:  No  What makes it worse:  Movement and palpation, medial aspect hurts the most.    She eats 2-3 servings of fruits and vegetables daily.She consumes 1 sweetened beverage(s) daily.She exercises with enough effort to increase her heart rate 30 to 60 minutes per day.  She exercises with enough effort to increase her heart rate 4 days per week. She is missing 2 dose(s) of medications per week.  She is not taking prescribed medications regularly due to remembering to take.     Left knee injury  - 3 days ago was on a stand up jet ski and twisted knee  - unable to bear weight  - on going pain with flexion and extension               Review of Systems   Constitutional, HEENT, cardiovascular, pulmonary, gi and gu systems are negative, except as otherwise noted.      Objective    Wt 59.5 kg (131 lb 2 oz)   LMP 08/08/2023   BMI 22.51 kg/m    Body mass index is 22.51  kg/m .  Physical Exam   GENERAL: healthy, alert and no distress  Left Knee: Inspection reveals normal alignment, no ecchymosis, is swollen compared to right. Pain to palpation along bilateral joint line. Pain with flexion and extension, holding knee at 45 degress angle. Negative lachmans. Pain with varus and valgus stress. Pain with dorsiflexion.     Right Knee: Inspection reveals normal alignment, no effusion, no ecchymosis. Palpation reveals no pain along joint line, patellar tendon, or patellar bursa. Normal flexion and extension range of motion.    XR Knee Left 3 Views    Result Date: 8/21/2023  PROCEDURE:  XR KNEE LEFT 3 VIEWS HISTORY: twisting injury left knee 3 days ago, unable to apply weight; Knee injury, left, initial encounter COMPARISON:  Right knee radiographs 6/22/2021 TECHNIQUE:  XR KNEE LEFT 3 VIEWS FINDINGS:  No acute osseous abnormality. Joints are appropriately aligned. Joint spaces are preserved. No patellar tilt or lateral subluxation. No joint effusion. Soft tissue is unremarkable.     IMPRESSION: No acute osseous abnormality. GREER PALM MD   SYSTEM ID:  [id number]                   DME (Durable Medical Equipment) Orders and Documentation  Orders Placed This Encounter   Procedures    Knee Supplies Order Hinged Knee Brace; Left        The patient was assessed and it was determined the patient is in need of the following listed DME Supplies/Equipment. Please complete supporting documentation below to demonstrate medical necessity.              DME (Durable Medical Equipment) Orders and Documentation  Orders Placed This Encounter   Procedures    Knee Supplies Order Hinged Knee Brace; Left    Crutches Order      The patient was assessed and it was determined the patient is in need of the following listed DME Supplies/Equipment. Please complete supporting documentation below to demonstrate medical necessity.      Knee Bracing Supplies Documentation  Patient requires the use of the ordered bracing  device due to following medical need/condition: knee strain

## 2023-08-21 NOTE — NURSING NOTE
Patient states she was waterskiing and fell 2 days ago.  She is experiencing Left knee pain rated 6 with swelling and walking is difficult.    Medication Rec Complete  Marce Pandey LPN............8/21/2023 9:07 AM

## 2023-08-21 NOTE — LETTER
August 21, 2023      George Mcknight  [address] [place] [address]        To Whom It May Concern:    George Mcknight  was seen on 08/21/23 .  Please excuse her  until MRI is completed due to knee injury and non weight bearing status.        Sincerely,        Aury Carter MD

## 2023-08-26 ENCOUNTER — MYC MEDICAL ADVICE (OUTPATIENT)
Dept: FAMILY MEDICINE | Facility: OTHER | Age: 28
End: 2023-08-26
Payer: COMMERCIAL

## 2023-08-26 ENCOUNTER — HOSPITAL ENCOUNTER (OUTPATIENT)
Dept: MRI IMAGING | Facility: OTHER | Age: 28
Discharge: HOME | End: 2023-08-26
Attending: STUDENT IN AN ORGANIZED HEALTH CARE EDUCATION/TRAINING PROGRAM | Admitting: STUDENT IN AN ORGANIZED HEALTH CARE EDUCATION/TRAINING PROGRAM
Payer: COMMERCIAL

## 2023-08-26 DIAGNOSIS — S89.92XA KNEE INJURY, LEFT, INITIAL ENCOUNTER: ICD-10-CM

## 2023-08-26 PROCEDURE — 73721 MRI JNT OF LWR EXTRE W/O DYE: CPT | Mod: LT

## 2023-08-31 ENCOUNTER — OFFICE VISIT (OUTPATIENT)
Dept: FAMILY MEDICINE | Facility: OTHER | Age: 28
End: 2023-08-31
Attending: FAMILY MEDICINE
Payer: COMMERCIAL

## 2023-08-31 ENCOUNTER — MYC MEDICAL ADVICE (OUTPATIENT)
Dept: FAMILY MEDICINE | Facility: OTHER | Age: 28
End: 2023-08-31

## 2023-08-31 VITALS
WEIGHT: 138.4 LBS | SYSTOLIC BLOOD PRESSURE: 102 MMHG | HEART RATE: 89 BPM | TEMPERATURE: 98.2 F | DIASTOLIC BLOOD PRESSURE: 66 MMHG | RESPIRATION RATE: 14 BRPM | OXYGEN SATURATION: 100 % | BODY MASS INDEX: 23.76 KG/M2

## 2023-08-31 DIAGNOSIS — S83.412D TEAR OF MEDIAL COLLATERAL LIGAMENT OF LEFT KNEE, SUBSEQUENT ENCOUNTER: Primary | ICD-10-CM

## 2023-08-31 PROCEDURE — 99213 OFFICE O/P EST LOW 20 MIN: CPT | Performed by: FAMILY MEDICINE

## 2023-08-31 ASSESSMENT — PAIN SCALES - GENERAL: PAINLEVEL: MODERATE PAIN (4)

## 2023-08-31 NOTE — LETTER
August 31, 2023      George Mcknight  [id number] [place] [address]        To Whom It May Concern:    George Mcknight was seen in our clinic. She may return to work with the following: special equipment needed: hinged knee brace and crutches for walking more than 100 yards on or about 8/31/2023.  This restriction can end on 9/10/23.      Sincerely,        Dann Christy MD

## 2023-08-31 NOTE — NURSING NOTE
"Chief Complaint   Patient presents with    RECHECK     Return to work       Initial /66   Pulse 89   Temp 98.2  F (36.8  C) (Tympanic)   Resp 14   Wt 62.8 kg (138 lb 6.4 oz)   LMP 08/08/2023   SpO2 100%   BMI 23.76 kg/m   Estimated body mass index is 23.76 kg/m  as calculated from the following:    Height as of 8/21/23: 1.626 m (5' 4\").    Weight as of this encounter: 62.8 kg (138 lb 6.4 oz).  Medication Reconciliation: complete    FOOD SECURITY SCREENING QUESTIONS  Hunger Vital Signs:  Within the past 12 months we worried whether our food would run out before we got money to buy more. Never  Within the past 12 months the food we bought just didn't last and we didn't have money to get more. Never  Lucila Baum LPN 8/31/2023 8:04 AM      "

## 2023-08-31 NOTE — PROGRESS NOTES
Assessment & Plan     (S83.412D) Tear of medial collateral ligament of left knee, subsequent encounter  (primary encounter diagnosis)  Comment: partial tear. Is progressing as expected. Note for work, and usually 4 weeks after injury most people are about 90% normal.   Plan: ice, OTC NSAIDS, bracing as needed.                  No follow-ups on file.    Dann Christy MD  [facility]    Momo Vazquez is a 27 year old, presenting for the following health issues:  RECHECK (Return to work)      8/31/2023     8:01 AM   Additional Questions   Roomed by BENEDICTO Gaytan   Accompanied by Self         8/31/2023     8:01 AM   Patient Reported Additional Medications   Patient reports taking the following new medications N/A       HPI     Injured left knee 2 weeks ago, on a jet ski. Was seen, nad has had an MRI showing:      Impression:  1 . Strain type injury with partial tearing of the medial collateral  ligament with shoulder tearing of proximal posterior fibers in  particular.  2. Small bone bruises medial tibial plateau and lateral femoral  condyle.     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 Has some aching in the area. Has been doing exercises directed by a physical therapist. Using a hinged brace. Feels mild weakness. Cannot yet fully extend yet.using crutches when walking longer distances. Can weight bear without them.              Review of Systems         Objective    /66   Pulse 89   Temp 98.2  F (36.8  C) (Tympanic)   Resp 14   Wt 62.8 kg (138 lb 6.4 oz)   LMP 08/08/2023   SpO2 100%   BMI 23.76 kg/m    Body mass index is 23.76 kg/m .  Physical Exam  Constitutional:       Appearance: Normal appearance.   Musculoskeletal:      Comments: Left knee with about 170 degrees of extension. Mild pain along mcl. No effusion and no ecchymosis.    Neurological:      General: No focal deficit present.      Mental Status: She is alert  and oriented to person, place, and time.   Psychiatric:         Mood and Affect: Mood normal.         Behavior: Behavior normal.         Thought Content: Thought content normal.

## 2023-09-06 ENCOUNTER — THERAPY VISIT (OUTPATIENT)
Dept: PHYSICAL THERAPY | Facility: OTHER | Age: 28
End: 2023-09-06
Attending: FAMILY MEDICINE
Payer: COMMERCIAL

## 2023-09-06 DIAGNOSIS — S83.412D TEAR OF MEDIAL COLLATERAL LIGAMENT OF LEFT KNEE, SUBSEQUENT ENCOUNTER: ICD-10-CM

## 2023-09-06 PROCEDURE — 97110 THERAPEUTIC EXERCISES: CPT | Mod: GP

## 2023-09-06 PROCEDURE — 97530 THERAPEUTIC ACTIVITIES: CPT | Mod: GP

## 2023-09-06 PROCEDURE — 97161 PT EVAL LOW COMPLEX 20 MIN: CPT | Mod: GP

## 2023-09-06 NOTE — PROGRESS NOTES
PHYSICAL THERAPY EVALUATION  Type of Visit: Evaluation    See electronic medical record for Abuse and Falls Screening details.    Subjective       Presenting condition or subjective complaint: Partial MCL tear  Date of onset: 08/19/23    Relevant medical history: Asthma; Concussions; Depression; History of fractures; Migraines or headaches; Pain at night or rest   Prior diagnostic imaging/testing results: MRI; X-ray     MRI Impression 8/26/23:  1 . Strain type injury with partial tearing of the medial collateral  ligament with shoulder tearing of proximal posterior fibers in  particular.  2. Small bone bruises medial tibial plateau and lateral femoral  condyle      Prior Level of Function  Transfers: Independent  Ambulation: Independent  ADL: Independent    Living Environment  Social support: With family members   Type of home: House; 2-story   Stairs to enter the home: Yes 3 Is there a railing: Yes   Ramp: No   Stairs inside the home: Yes 13 Is there a railing: Yes   Help at home: None  Equipment owned: Crutches     Employment: Yes Registered Nurse  Hobbies/Interests: Rollerblading, jet skiing, wakeboarding, water skiing, snowboarding, hockey, hiking    Patient goals for therapy: Improved gait without knee instability, flexion, extension without moderate to severe pain. Return to my overall active lifestyle to include hiking, rollerblading, ice hockey, wakeboarding, etc.    Pain assessment: Pain present  See objective evaluation for additional pain details     Objective   KNEE EVALUATION  PAIN: Pain Level at Rest: 0/10  Pain Level with Use: 8/10  Pain Location: knee  Pain Quality: Aching, Dull, Sharp, and Shooting  Pain is Exacerbated By: walking, certain positions, bending  Pain is Relieved By: cold, otc medications, rest, and brace  INTEGUMENTARY (edema, incisions): WFL  POSTURE: WFL  GAIT:  Weightbearing Status: WBAT  Assistive Device(s): Brace  Gait Deviations: Antalgic  Stance time decreased  ROM:   (Degrees)  Left AROM Left PROM  Right AROM Right PROM   Knee Flexion 90 120 WNL WNL   Knee Extension 20 20 WNL WNL     STRENGTH:   Pain: - none + mild ++ moderate +++ severe  Strength Scale: 0-5/5 Left Right   Knee Flexion 4+ 5   Knee Extension 3- 5   Hip strength grossly WNL    SPECIAL TESTS:    Left Right   Lesvia's (ITB/TFL) Positive Negative    Ely's Positive Positive   Ligamentous Stability     Anterior Drawer (ACL) Positive Positive   Posterior Drawer (PCL) Negative Negative   Valgus Stress Testing at 0 Deg and 30 Deg Positive Negative   Varus Stress Testing at 0 Deg and 30 Deg  Negative Negative     FUNCTIONAL TESTS: Double Leg Squat: Knee valgus, Hip internal rotation, and Improper use of glutes/hips  PALPATION: WFL      Assessment & Plan   CLINICAL IMPRESSIONS  Medical Diagnosis: Tear of medial collateral ligament of left knee, subsequent encounter    Treatment Diagnosis: knee pain with instability   Impression/Assessment: Patient is a 27 year old female with L knee complaints.  The following significant findings have been identified: Pain, Decreased ROM/flexibility, Impaired gait, Impaired muscle performance, and Decreased activity tolerance. These impairments interfere with their ability to perform work tasks, recreational activities, household mobility, and community mobility as compared to previous level of function.     Clinical Decision Making (Complexity):  Clinical Presentation: Stable/Uncomplicated  Clinical Presentation Rationale: based on medical and personal factors listed in PT evaluation  Clinical Decision Making (Complexity): Low complexity    PLAN OF CARE  Treatment Interventions:  Interventions: Manual Therapy, Neuromuscular Re-education, Therapeutic Activity, Therapeutic Exercise    Long Term Goals     PT Goal 1  Goal Identifier: HEP  Goal Description: Patient will be independent with HEP  Rationale: to maximize safety and independence with performance of ADLs and functional tasks;to maximize safety  and independence within the home;to maximize safety and independence within the community  Goal Progress: Patient demonstrates good understanding of exercises and progression; independent with HEP  Target Date: 09/06/23  Date Met: 09/06/23      Frequency of Treatment: 1 visit  Duration of Treatment: 1 visit    Education Assessment:   Learner/Method: Patient;Listening;Demonstration;Pictures/Video;No Barriers to Learning    Risks and benefits of evaluation/treatment have been explained.   Patient/Family/caregiver agrees with Plan of Care.     Evaluation Time:     PT Eval, Low Complexity Minutes (26848): 15     Signing Clinician: Elise Ayoub PT

## 2023-09-06 NOTE — ADDENDUM NOTE
Addended by: JOE MARTÍNEZ on: 9/6/2023 09:04 AM     Modules accepted: Orders    
Detail Level: Zone
Initiate Treatment: Begin using the Imiquimod 5% cream on the left Vulva QHS for three times a week. The patient will do this for one month and then stop for two weeks. Repeat.

## 2023-09-08 ENCOUNTER — THERAPY VISIT (OUTPATIENT)
Dept: PHYSICAL THERAPY | Facility: OTHER | Age: 28
End: 2023-09-08
Attending: FAMILY MEDICINE
Payer: COMMERCIAL

## 2023-09-08 DIAGNOSIS — S83.412D TEAR OF MEDIAL COLLATERAL LIGAMENT OF LEFT KNEE, SUBSEQUENT ENCOUNTER: Primary | ICD-10-CM

## 2023-09-08 PROCEDURE — 97110 THERAPEUTIC EXERCISES: CPT | Mod: GP,PO

## 2023-09-08 PROCEDURE — 97140 MANUAL THERAPY 1/> REGIONS: CPT | Mod: GP,PO

## 2023-11-02 ENCOUNTER — VIRTUAL VISIT (OUTPATIENT)
Dept: FAMILY MEDICINE | Facility: OTHER | Age: 28
End: 2023-11-02
Attending: FAMILY MEDICINE
Payer: COMMERCIAL

## 2023-11-02 DIAGNOSIS — S06.9X9S MILD TRAUMATIC BRAIN INJURY WITH LOSS OF CONSCIOUSNESS, SEQUELA (H): ICD-10-CM

## 2023-11-02 DIAGNOSIS — F98.8 ATTENTION DEFICIT DISORDER, UNSPECIFIED HYPERACTIVITY PRESENCE: Primary | ICD-10-CM

## 2023-11-02 PROCEDURE — 99441 PR PHYSICIAN TELEPHONE EVALUATION 5-10 MIN: CPT | Mod: 93 | Performed by: FAMILY MEDICINE

## 2023-11-02 RX ORDER — DEXTROAMPHETAMINE SACCHARATE, AMPHETAMINE ASPARTATE MONOHYDRATE, DEXTROAMPHETAMINE SULFATE AND AMPHETAMINE SULFATE 5; 5; 5; 5 MG/1; MG/1; MG/1; MG/1
20 CAPSULE, EXTENDED RELEASE ORAL DAILY
Qty: 30 CAPSULE | Refills: 0 | Status: SHIPPED | OUTPATIENT
Start: 2023-11-02

## 2023-11-02 RX ORDER — DEXTROAMPHETAMINE SACCHARATE, AMPHETAMINE ASPARTATE, DEXTROAMPHETAMINE SULFATE AND AMPHETAMINE SULFATE 2.5; 2.5; 2.5; 2.5 MG/1; MG/1; MG/1; MG/1
TABLET ORAL
Qty: 30 TABLET | Refills: 0 | Status: SHIPPED | OUTPATIENT
Start: 2023-11-30

## 2023-11-02 RX ORDER — DEXTROAMPHETAMINE SACCHARATE, AMPHETAMINE ASPARTATE MONOHYDRATE, DEXTROAMPHETAMINE SULFATE AND AMPHETAMINE SULFATE 5; 5; 5; 5 MG/1; MG/1; MG/1; MG/1
20 CAPSULE, EXTENDED RELEASE ORAL DAILY
Qty: 30 CAPSULE | Refills: 0 | Status: SHIPPED | OUTPATIENT
Start: 2023-12-28

## 2023-11-02 RX ORDER — DEXTROAMPHETAMINE SACCHARATE, AMPHETAMINE ASPARTATE, DEXTROAMPHETAMINE SULFATE AND AMPHETAMINE SULFATE 2.5; 2.5; 2.5; 2.5 MG/1; MG/1; MG/1; MG/1
TABLET ORAL
Qty: 30 TABLET | Refills: 0 | Status: SHIPPED | OUTPATIENT
Start: 2023-11-02

## 2023-11-02 RX ORDER — DEXTROAMPHETAMINE SACCHARATE, AMPHETAMINE ASPARTATE, DEXTROAMPHETAMINE SULFATE AND AMPHETAMINE SULFATE 2.5; 2.5; 2.5; 2.5 MG/1; MG/1; MG/1; MG/1
TABLET ORAL
Qty: 30 TABLET | Refills: 0 | Status: SHIPPED | OUTPATIENT
Start: 2023-12-28

## 2023-11-02 RX ORDER — DEXTROAMPHETAMINE SACCHARATE, AMPHETAMINE ASPARTATE MONOHYDRATE, DEXTROAMPHETAMINE SULFATE AND AMPHETAMINE SULFATE 5; 5; 5; 5 MG/1; MG/1; MG/1; MG/1
20 CAPSULE, EXTENDED RELEASE ORAL DAILY
Qty: 30 CAPSULE | Refills: 0 | Status: SHIPPED | OUTPATIENT
Start: 2023-11-30

## 2023-11-02 NOTE — PROGRESS NOTES
George is a 28 year old who is being evaluated via a billable telephone visit.      What phone number would you like to be contacted at? [phone]  How would you like to obtain your AVS? Yared    Distant Location (provider location):  On-site    Assessment & Plan     Attention deficit disorder, unspecified hyperactivity presence  Stable.  She is doing well on her current doses of Adderall XR and immediate release adderall.  Adderall XR 20 mg #30x3 and Adderall IR 10 mg #30x3 refilled today.  Her last contract was signed on 1/19/23 and discussed follow up in person for her next refill so that we can update that again.  Her last urine drug screen was on 8/10/23.  She will be due for follow up around 1/25/24.  - amphetamine-dextroamphetamine (ADDERALL XR) 20 MG 24 hr capsule; Take 1 capsule (20 mg) by mouth daily  - amphetamine-dextroamphetamine (ADDERALL) 10 MG tablet; Take 1 tablet by mouth daily in the afternoon as needed.  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            Return in about 12 weeks (around 1/25/2024) for ADD med refill.    Cyn Avendaño MD  [facility]    Subjective   George is a 28 year old, presenting for the following health issues:  Recheck Medication        11/2/2023     1:42 PM   Additional Questions   Roomed by Irene Vazquez is in need of refills of her Adderall,  which she uses for ADD.  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  Will be there through mid December.      PDMP Review         Value Time User    State PDMP site checked  Yes 11/2/2023  2:21 PM Cyn Avendaño MD           History of Present Illness       Reason for visit:  Adderall Refill    She eats 2-3 servings of fruits and vegetables daily.She consumes 1 sweetened beverage(s) daily.She exercises with enough effort to increase her heart rate 30 to 60 minutes per day.  She exercises with enough effort to increase her heart rate 4 days per week. She is missing 1 dose(s) of medications per week.  She is not taking prescribed medications regularly due to remembering to take.               Review of Systems   Constitutional, HEENT, cardiovascular, pulmonary, GI, , musculoskeletal, neuro, skin, endocrine and psych systems are negative, except as otherwise noted.      Objective    Vitals - Patient Reported  Pain Score: No Pain (0)        Physical Exam   healthy, alert, and no distress  PSYCH: Alert and oriented times 3; coherent speech, normal   rate and volume, able to articulate logical thoughts, able   to abstract reason, no tangential thoughts, no hallucinations   or delusions  Her affect is normal  RESP: No cough, no audible wheezing, able to talk in full sentences  Remainder of exam unable to be completed due to telephone visits                Phone call duration: 5 minutes

## 2024-02-20 ENCOUNTER — APPOINTMENT (OUTPATIENT)
Dept: LAB | Facility: HOSPITAL | Age: 29
End: 2024-02-20

## 2024-03-15 ENCOUNTER — APPOINTMENT (OUTPATIENT)
Dept: OCCUPATIONAL MEDICINE | Facility: OTHER | Age: 29
End: 2024-03-15

## 2024-04-15 ENCOUNTER — APPOINTMENT (OUTPATIENT)
Dept: OCCUPATIONAL MEDICINE | Facility: OTHER | Age: 29
End: 2024-04-15

## 2024-05-15 ENCOUNTER — APPOINTMENT (OUTPATIENT)
Dept: OCCUPATIONAL MEDICINE | Facility: OTHER | Age: 29
End: 2024-05-15

## 2024-10-21 ENCOUNTER — APPOINTMENT (OUTPATIENT)
Dept: OCCUPATIONAL MEDICINE | Facility: OTHER | Age: 29
End: 2024-10-21

## 2024-11-09 ENCOUNTER — MYC REFILL (OUTPATIENT)
Dept: FAMILY MEDICINE | Facility: OTHER | Age: 29
End: 2024-11-09
Payer: COMMERCIAL

## 2024-11-09 DIAGNOSIS — F41.8 DEPRESSION WITH ANXIETY: ICD-10-CM

## 2024-11-09 DIAGNOSIS — J45.990 EXERCISE-INDUCED ASTHMA: ICD-10-CM

## 2024-11-09 DIAGNOSIS — F98.8 ATTENTION DEFICIT DISORDER, UNSPECIFIED HYPERACTIVITY PRESENCE: ICD-10-CM

## 2024-11-09 RX ORDER — ALBUTEROL SULFATE 90 UG/1
2 INHALANT RESPIRATORY (INHALATION) EVERY 4 HOURS PRN
Qty: 18 G | Refills: 11 | Status: CANCELLED | OUTPATIENT
Start: 2024-11-09

## 2024-11-09 RX ORDER — DEXTROAMPHETAMINE SACCHARATE, AMPHETAMINE ASPARTATE MONOHYDRATE, DEXTROAMPHETAMINE SULFATE AND AMPHETAMINE SULFATE 5; 5; 5; 5 MG/1; MG/1; MG/1; MG/1
20 CAPSULE, EXTENDED RELEASE ORAL DAILY
Qty: 30 CAPSULE | Refills: 0 | Status: CANCELLED | OUTPATIENT
Start: 2024-11-09

## 2024-11-09 RX ORDER — DEXTROAMPHETAMINE SACCHARATE, AMPHETAMINE ASPARTATE, DEXTROAMPHETAMINE SULFATE AND AMPHETAMINE SULFATE 2.5; 2.5; 2.5; 2.5 MG/1; MG/1; MG/1; MG/1
TABLET ORAL
Qty: 30 TABLET | Refills: 0 | Status: CANCELLED | OUTPATIENT
Start: 2024-11-09

## 2024-11-09 RX ORDER — HYDROXYZINE PAMOATE 25 MG/1
25 CAPSULE ORAL 3 TIMES DAILY PRN
Qty: 90 CAPSULE | Refills: 11 | Status: CANCELLED | OUTPATIENT
Start: 2024-11-09

## 2024-11-09 SDOH — HEALTH STABILITY: PHYSICAL HEALTH: ON AVERAGE, HOW MANY MINUTES DO YOU ENGAGE IN EXERCISE AT THIS LEVEL?: 40 MIN

## 2024-11-09 SDOH — HEALTH STABILITY: PHYSICAL HEALTH: ON AVERAGE, HOW MANY DAYS PER WEEK DO YOU ENGAGE IN MODERATE TO STRENUOUS EXERCISE (LIKE A BRISK WALK)?: 6 DAYS

## 2024-11-09 ASSESSMENT — SOCIAL DETERMINANTS OF HEALTH (SDOH): HOW OFTEN DO YOU GET TOGETHER WITH FRIENDS OR RELATIVES?: MORE THAN THREE TIMES A WEEK

## 2024-11-11 ENCOUNTER — OFFICE VISIT (OUTPATIENT)
Dept: FAMILY MEDICINE | Facility: OTHER | Age: 29
End: 2024-11-11
Attending: FAMILY MEDICINE
Payer: COMMERCIAL

## 2024-11-11 VITALS
HEIGHT: 64 IN | WEIGHT: 125.6 LBS | BODY MASS INDEX: 21.44 KG/M2 | OXYGEN SATURATION: 99 % | RESPIRATION RATE: 16 BRPM | DIASTOLIC BLOOD PRESSURE: 74 MMHG | SYSTOLIC BLOOD PRESSURE: 112 MMHG | TEMPERATURE: 98.2 F | HEART RATE: 90 BPM

## 2024-11-11 DIAGNOSIS — J45.990 EXERCISE-INDUCED ASTHMA: ICD-10-CM

## 2024-11-11 DIAGNOSIS — G43.809 OTHER MIGRAINE WITHOUT STATUS MIGRAINOSUS, NOT INTRACTABLE: ICD-10-CM

## 2024-11-11 DIAGNOSIS — Z23 NEED FOR COVID-19 VACCINE: ICD-10-CM

## 2024-11-11 DIAGNOSIS — F41.8 DEPRESSION WITH ANXIETY: ICD-10-CM

## 2024-11-11 DIAGNOSIS — Z00.00 ROUTINE GENERAL MEDICAL EXAMINATION AT A HEALTH CARE FACILITY: Primary | ICD-10-CM

## 2024-11-11 DIAGNOSIS — F98.8 ATTENTION DEFICIT DISORDER, UNSPECIFIED TYPE: ICD-10-CM

## 2024-11-11 DIAGNOSIS — Z23 NEED FOR VACCINATION FOR PNEUMOCOCCUS: ICD-10-CM

## 2024-11-11 LAB — CREAT UR-MCNC: 142 MG/DL

## 2024-11-11 PROCEDURE — 80365 DRUG SCREENING OXYCODONE: CPT | Mod: ZL | Performed by: FAMILY MEDICINE

## 2024-11-11 PROCEDURE — 80348 DRUG SCREENING BUPRENORPHINE: CPT | Mod: ZL | Performed by: FAMILY MEDICINE

## 2024-11-11 RX ORDER — ONDANSETRON 4 MG/1
4 TABLET, ORALLY DISINTEGRATING ORAL EVERY 8 HOURS PRN
Qty: 30 TABLET | Refills: 0 | Status: SHIPPED | OUTPATIENT
Start: 2024-11-11 | End: 2024-11-11

## 2024-11-11 RX ORDER — DEXTROAMPHETAMINE SACCHARATE, AMPHETAMINE ASPARTATE MONOHYDRATE, DEXTROAMPHETAMINE SULFATE AND AMPHETAMINE SULFATE 5; 5; 5; 5 MG/1; MG/1; MG/1; MG/1
20 CAPSULE, EXTENDED RELEASE ORAL DAILY
Qty: 30 CAPSULE | Refills: 0 | Status: SHIPPED | OUTPATIENT
Start: 2025-01-06

## 2024-11-11 RX ORDER — RIZATRIPTAN BENZOATE 10 MG/1
10 TABLET ORAL
Qty: 10 TABLET | Refills: 11 | Status: SHIPPED | OUTPATIENT
Start: 2024-11-11

## 2024-11-11 RX ORDER — DEXTROAMPHETAMINE SACCHARATE, AMPHETAMINE ASPARTATE, DEXTROAMPHETAMINE SULFATE AND AMPHETAMINE SULFATE 2.5; 2.5; 2.5; 2.5 MG/1; MG/1; MG/1; MG/1
TABLET ORAL
Qty: 30 TABLET | Refills: 0 | Status: SHIPPED | OUTPATIENT
Start: 2025-01-06

## 2024-11-11 RX ORDER — HYDROXYZINE PAMOATE 25 MG/1
25 CAPSULE ORAL 3 TIMES DAILY PRN
Qty: 30 CAPSULE | Refills: 11 | Status: SHIPPED | OUTPATIENT
Start: 2024-11-11

## 2024-11-11 RX ORDER — DEXTROAMPHETAMINE SACCHARATE, AMPHETAMINE ASPARTATE MONOHYDRATE, DEXTROAMPHETAMINE SULFATE AND AMPHETAMINE SULFATE 5; 5; 5; 5 MG/1; MG/1; MG/1; MG/1
20 CAPSULE, EXTENDED RELEASE ORAL DAILY
Qty: 30 CAPSULE | Refills: 0 | Status: SHIPPED | OUTPATIENT
Start: 2024-11-11

## 2024-11-11 RX ORDER — ALBUTEROL SULFATE 90 UG/1
2 INHALANT RESPIRATORY (INHALATION) EVERY 4 HOURS PRN
Qty: 18 G | Refills: 11 | Status: SHIPPED | OUTPATIENT
Start: 2024-11-11

## 2024-11-11 RX ORDER — DEXTROAMPHETAMINE SACCHARATE, AMPHETAMINE ASPARTATE MONOHYDRATE, DEXTROAMPHETAMINE SULFATE AND AMPHETAMINE SULFATE 5; 5; 5; 5 MG/1; MG/1; MG/1; MG/1
20 CAPSULE, EXTENDED RELEASE ORAL DAILY
Qty: 30 CAPSULE | Refills: 0 | Status: SHIPPED | OUTPATIENT
Start: 2024-12-09

## 2024-11-11 RX ORDER — DEXTROAMPHETAMINE SACCHARATE, AMPHETAMINE ASPARTATE, DEXTROAMPHETAMINE SULFATE AND AMPHETAMINE SULFATE 2.5; 2.5; 2.5; 2.5 MG/1; MG/1; MG/1; MG/1
TABLET ORAL
Qty: 30 TABLET | Refills: 0 | Status: SHIPPED | OUTPATIENT
Start: 2024-12-09

## 2024-11-11 RX ORDER — DEXTROAMPHETAMINE SACCHARATE, AMPHETAMINE ASPARTATE, DEXTROAMPHETAMINE SULFATE AND AMPHETAMINE SULFATE 2.5; 2.5; 2.5; 2.5 MG/1; MG/1; MG/1; MG/1
TABLET ORAL
Qty: 30 TABLET | Refills: 0 | Status: SHIPPED | OUTPATIENT
Start: 2024-11-11

## 2024-11-11 RX ORDER — ONDANSETRON 4 MG/1
4 TABLET, ORALLY DISINTEGRATING ORAL EVERY 8 HOURS PRN
Qty: 30 TABLET | Refills: 11 | Status: SHIPPED | OUTPATIENT
Start: 2024-11-11

## 2024-11-11 ASSESSMENT — PAIN SCALES - GENERAL: PAINLEVEL_OUTOF10: NO PAIN (0)

## 2024-11-11 NOTE — LETTER
[facility]  11/11/24  Patient: George Mcknight  YOB: 1995  Medical Record Number: [id number]                                                                                  Non-Opioid Controlled Substance Agreement    This is an agreement between you and your provider regarding safe and appropriate use of controlled substances prescribed by your care team. Controlled substances are?medicines that can cause physical and mental dependence (abuse).     There are strict laws about having and using these medicines. We here at [facility] are  committed to working with you in your efforts to get better. To support you in this work, we'll help you schedule regular office appointments for medicine refills. If we must cancel or change your appointment for any reason, we'll make sure you have enough medicine to last until your next appointment.     As a Provider, I will:   Listen carefully to your concerns while treating you with respect.   Recommend a treatment plan that I believe is in your best interest and may involve therapies other than medicine.    Talk with you often about the possible benefits and the risk of harm of any medicine that we prescribe for you.  Assess the safety of this medicine and check how well it works.    Provide a plan on how to taper (discontinue or go off) using this medicine if the decision is made to stop its use.      ::  As a Patient, I understand controlled substances:     Are prescribed by my care provider to help me function or work and manage my condition(s).?  Are strong medicines and can cause serious side effects.     Need to be taken exactly as prescribed.?Combining controlled substances with certain medicines or chemicals (such as illegal drugs, alcohol, sedatives, sleeping pills, and benzodiazepines) can be dangerous or even fatal.? If I stop taking my medicines suddenly, I may have severe withdrawal symptoms.     The risks, benefits, and  side effects of these medicine(s) were explained to me. I agree that:    I will take part in other treatments as advised by my care team. This may be psychiatry or counseling, physical therapy, behavioral therapy, group treatment or a referral to specialist.    I will keep all my appointments and understand this is part of the monitoring of controlled substances.?My care team may require an office visit for EVERY controlled substance refill. If I miss appointments or don t follow instructions, my care team may stop my medicine    I will take my medicines as prescribed. I will not change the dose or schedule unless my care team tells me to. There will be no refills if I run out early.      I may be asked to come to the clinic and complete a urine drug test or complete a pill count. If I don t give a urine sample or participate in a pill count, the care team may stop my medicine.    I will only receive controlled substance prescriptions from this clinic. If I am treated by another provider, I will tell them that I am taking controlled substances and that I have a treatment agreement with this provider. I will inform my [facility] care team within one business day if I am given a prescription for any controlled substance by another healthcare provider. My [facility] care team can contact other providers and pharmacists about my use of any medicines.    It is up to me to make sure that I don't run out of my medicines on weekends or holidays.?If my care team is willing to refill my prescription without a visit, I must request refills only during office hours. Refills may take up to 3 business days to process. I will use one pharmacy to fill all my controlled substance prescriptions. I will notify the clinic about any changes to my insurance or medicine availability.    I am responsible for my prescriptions. If the medicine/prescription is lost, stolen or destroyed, it will not be replaced.?I also agree not  to share controlled substance medicines with anyone.     I am aware I should not use any illegal or recreational drugs. I agree not to drink alcohol unless my care team says I can.     If I enroll in the [place] Medical Cannabis program, I will tell my care team before my next refill.    I will tell my care team right away if I become pregnant, have a new medical problem treated outside of my regular clinic, or have a change in my medicines.     I understand that this medicine can affect my thinking, judgment and reaction time.? Alcohol and drugs affect the brain and body, which can affect the safety of my driving. Being under the influence of alcohol or drugs can affect my decision-making, behaviors, personal safety and the safety of others. Driving while impaired (DWI) can occur if a person is driving, operating or in physical control of a car, motorcycle, boat, snowmobile, ATV, motorbike, off-road vehicle or any other motor vehicle ([place] Statute 169A.20). I understand the risk if I choose to drive or operate any vehicle or machinery.    I understand that if I do not follow any of the conditions above, my prescriptions or treatment may be stopped or changed.   I agree that my provider, clinic care team and pharmacy may work with any city, state or federal law enforcement agency that investigates the misuse, sale or other diversion of my controlled medicine. I will allow my provider to discuss my care with, or share a copy of, this agreement with any other treating provider, pharmacy or emergency room where I receive care.     I have read this agreement and have asked questions about anything I did not understand.    ________________________________________________________  Patient Signature - George Mcknight     ___________________                   Date     ________________________________________________________  Provider Signature - Cyn Avendaño MD       ___________________                   Date      ________________________________________________________  Witness Signature (required if provider not present while patient signing)          ___________________                   Date

## 2024-11-11 NOTE — PROGRESS NOTES
Preventive Care Visit  [facility] AND [facility]  Cyn Avendaño MD, Family Medicine  Nov 11, 2024        Momo Vazquez is a 29 year old, presenting for the following:  Physical        11/11/2024     3:39 PM   Additional Questions   Roomed by Irene Vazquez is here today for a physical.      Stopped taking a lot of her medications.  Sumatriptan didn't seem to help much.  Stopped propranolol.  Raynauds symptoms got better as well as dizziness.  She didn't really notice much increase in her headaches.  She also has stopped gluten and dairy, which she thinks have helped reduce headaches.    She is in need of refills of her Adderall, which she uses for her ADD.  She needs a few other refills of her other medications today.         1/18/2023     2:08 PM 5/25/2023    12:54 PM 6/25/2024    10:37 AM   ACT Total Scores   ACT TOTAL SCORE (Goal Greater than or Equal to 20) 24 23 22   In the past 12 months, how many times did you visit the emergency room for your asthma without being admitted to the hospital? 0  0  0    In the past 12 months, how many times were you hospitalized overnight because of your asthma? 0  0  0        Patient-reported         Patient does not have a Health Care Directive: Discussed advance care planning with patient; however, patient declined at this time.      11/9/2024   General Health   How would you rate your overall physical health? Good   Feel stress (tense, anxious, or unable to sleep) Rather much      (!) STRESS CONCERN      11/9/2024   Nutrition   Three or more servings of calcium each day? Yes   Diet: Gluten-free/reduced    Other   If other, please elaborate: Dairy restricted   How many servings of fruit and vegetables per day? 4 or more   How many sweetened beverages each day? 0-1       Multiple values from one day are sorted in reverse-chronological order         11/9/2024   Exercise   Days per week of moderate/strenous exercise 6 days   Average minutes spent  exercising at this level 40 min            11/9/2024   Social Factors   Frequency of gathering with friends or relatives More than three times a week   Worry food won't last until get money to buy more No   Food not last or not have enough money for food? No   Do you have housing? (Housing is defined as stable permanent housing and does not include staying ouside in a car, in a tent, in an abandoned building, in an overnight shelter, or couch-surfing.) Yes   Are you worried about losing your housing? No   Lack of transportation? No   Unable to get utilities (heat,electricity)? No            11/9/2024   Dental   Dentist two times every year? Yes            11/9/2024   TB Screening   Were you born outside of the [place]? No            Today's PHQ-2 Score:       11/11/2024    11:18 AM   PHQ-2 ( 1999 [employer])   Q1: Little interest or pleasure in doing things 1    Q2: Feeling down, depressed or hopeless 1    PHQ-2 Score 2    Q1: Little interest or pleasure in doing things Several days   Q2: Feeling down, depressed or hopeless Several days   PHQ-2 Score 2       Patient-reported           11/9/2024   Substance Use   Alcohol more than 3/day or more than 7/wk No   Do you use any other substances recreationally? No        Social History     Tobacco Use    Smoking status: Never    Smokeless tobacco: Never   Vaping Use    Vaping status: Never Used   Substance Use Topics    Alcohol use: Yes     Comment: Alcoholic Drinks/day: a few times a month    Drug use: No     Comment: Drug use: No          Mammogram Screening - Patient under 40 years of age: Routine Mammogram Screening not recommended.         11/9/2024   STI Screening   New sexual partner(s) since last STI/HIV test? No        History of abnormal Pap smear: No - age 21-29 PAP every 3 years recommended        1/19/2023     1:16 PM   PAP / HPV   PAP Negative for Intraepithelial Lesion or Malignancy (NILM)            11/9/2024   Contraception/Family Planning   Questions about  contraception or family planning No           Reviewed and updated as needed this visit by Provider                    Past Medical History:   Diagnosis Date    Convergence insufficiency 2012,as a result of TBI - had to wear prism glasses for about 1 year.    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    Migraine without status migrainosus, not intractable     No Comments Provided    Other specified anxiety disorders     No Comments Provided    Other specified behavioral and emotional disorders with onset usually occurring in childhood and adolescence     No Comments Provided     No past surgical history on file.  BP Readings from Last 3 Encounters:   11/11/24 112/74   08/31/23 102/66   08/21/23 126/70    Wt Readings from Last 3 Encounters:   11/11/24 57 kg (125 lb 9.6 oz)   08/31/23 62.8 kg (138 lb 6.4 oz)   08/21/23 59.5 kg (131 lb 2 oz)                  Patient Active Problem List   Diagnosis    ADD (attention deficit disorder)    Controlled substance agreement 10/08/20    Depression with anxiety    Insomnia    Migraine without status migrainosus, not intractable    Exercise-induced asthma    Raynaud's disease without gangrene    Tear of medial collateral ligament of left knee, subsequent encounter    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    Social History     Tobacco Use    Smoking status: Never    Smokeless tobacco: Never   Substance Use Topics    Alcohol use: Yes     Comment: Alcoholic Drinks/day: a few times a month     No family history on file.      Current Outpatient Medications   Medication Sig Dispense Refill    albuterol (PROAIR HFA/PROVENTIL HFA/VENTOLIN HFA) 108 (90 Base) MCG/ACT inhaler Inhale 2 puffs into the lungs every 4 hours as needed for shortness of breath or wheezing. 18 g 11    amphetamine-dextroamphetamine (ADDERALL XR) 20 MG 24 hr capsule Take 1 capsule (20 mg) by  mouth daily. 30 capsule 0    [START ON 12/9/2024] amphetamine-dextroamphetamine (ADDERALL XR) 20 MG 24 hr capsule Take 1 capsule (20 mg) by mouth daily. 30 capsule 0    [START ON 1/6/2025] amphetamine-dextroamphetamine (ADDERALL XR) 20 MG 24 hr capsule Take 1 capsule (20 mg) by mouth daily. 30 capsule 0    [START ON 12/9/2024] amphetamine-dextroamphetamine (ADDERALL) 10 MG tablet Take 1 tablet by mouth daily in the afternoon as needed. 30 tablet 0    [START ON 1/6/2025] amphetamine-dextroamphetamine (ADDERALL) 10 MG tablet Take 1 tablet by mouth daily in the afternoon as needed 30 tablet 0    amphetamine-dextroamphetamine (ADDERALL) 10 MG tablet Take 1 tablet by mouth daily in the afternoon as needed 30 tablet 0    Cholecalciferol (VITAMIN D3) 1000 UNITS CAPS Take 1,000 Units by mouth      hydrOXYzine leni (VISTARIL) 25 MG capsule Take 1 capsule (25 mg) by mouth 3 times daily as needed for anxiety. 30 capsule 11    NIFEdipine ER OSMOTIC (PROCARDIA XL) 30 MG 24 hr tablet Take 1 tablet (30 mg) by mouth daily 90 tablet 3    ondansetron (ZOFRAN ODT) 4 MG ODT tab Take 1 tablet (4 mg) by mouth every 8 hours as needed for nausea. 30 tablet 11    pseudoePHEDrine (SUDAFED) 60 MG tablet Take 60 mg by mouth      rizatriptan (MAXALT) 10 MG tablet Take 1 tablet (10 mg) by mouth at onset of headache for migraine. May repeat in 2 hours. Max 3 tablets/24 hours. 10 tablet 11     Allergies   Allergen Reactions    No Clinical Screening - See Comments Other (See Comments)     Trees - itchy  Swelling of face/eyes/throat  Trees - itchy    Seasonal Allergies Other (See Comments)     Trees - itchy    Trichophyton Other (See Comments)     MOLD - Swelling of face/eyes/throat    Cats Other (See Comments)     Swelling of face/eyes/throat    Tree Extract Other (See Comments)         Review of Systems  Constitutional, HEENT, cardiovascular, pulmonary, GI, , musculoskeletal, neuro, skin, endocrine and psych systems are negative, except as  "otherwise noted.     Objective    Exam  /74   Pulse 90   Temp 98.2  F (36.8  C) (Tympanic)   Resp 16   Ht 1.626 m (5' 4\")   Wt 57 kg (125 lb 9.6 oz)   LMP 10/22/2024 (Approximate)   SpO2 99%   Breastfeeding No   BMI 21.56 kg/m     Estimated body mass index is 21.56 kg/m  as calculated from the following:    Height as of this encounter: 1.626 m (5' 4\").    Weight as of this encounter: 57 kg (125 lb 9.6 oz).    Physical Exam  Constitutional:       Appearance: Normal appearance. She is well-developed.   HENT:      Head: Normocephalic.      Right Ear: Tympanic membrane and external ear normal.      Left Ear: Tympanic membrane and external ear normal.      Nose: Nose normal.      Mouth/Throat:      Mouth: Mucous membranes are moist.      Pharynx: No oropharyngeal exudate or posterior oropharyngeal erythema.   Eyes:      Extraocular Movements: Extraocular movements intact.      Pupils: Pupils are equal, round, and reactive to light.   Neck:      Thyroid: No thyromegaly.   Cardiovascular:      Rate and Rhythm: Normal rate and regular rhythm.      Heart sounds: Normal heart sounds. No murmur heard.  Pulmonary:      Effort: Pulmonary effort is normal. No respiratory distress.      Breath sounds: Normal breath sounds. No wheezing or rales.      Comments: Declines breast exam.  Chest:      Chest wall: No tenderness.   Abdominal:      General: Bowel sounds are normal. There is no distension.      Palpations: Abdomen is soft. There is no mass.      Tenderness: There is no abdominal tenderness. There is no guarding or rebound.   Musculoskeletal:         General: No tenderness or deformity.      Cervical back: Normal range of motion and neck supple.   Lymphadenopathy:      Cervical: No cervical adenopathy.   Skin:     General: Skin is warm and dry.   Neurological:      Mental Status: She is alert and oriented to person, place, and time.      Cranial Nerves: No cranial nerve deficit.      Coordination: Coordination " normal.   Psychiatric:         Mood and Affect: Mood normal.         Behavior: Behavior normal.       PDMP Review         Value Time User    State PDMP site checked  Yes 11/11/2024  3:51 PM Cyn Avendaño MD               ICD-10-CM    1. Routine general medical examination at a health care facility  Z00.00       2. Other migraine without status migrainosus, not intractable  G43.809 rizatriptan (MAXALT) 10 MG tablet      3. Attention deficit disorder, unspecified type  F98.8 amphetamine-dextroamphetamine (ADDERALL XR) 20 MG 24 hr capsule     amphetamine-dextroamphetamine (ADDERALL XR) 20 MG 24 hr capsule     amphetamine-dextroamphetamine (ADDERALL XR) 20 MG 24 hr capsule     amphetamine-dextroamphetamine (ADDERALL) 10 MG tablet     amphetamine-dextroamphetamine (ADDERALL) 10 MG tablet     amphetamine-dextroamphetamine (ADDERALL) 10 MG tablet     Drug Confirmation Panel Urine with Creat     Drug Confirmation Panel Urine with Creat      4. Exercise-induced asthma  J45.990 albuterol (PROAIR HFA/PROVENTIL HFA/VENTOLIN HFA) 108 (90 Base) MCG/ACT inhaler      5. Depression with anxiety  F41.8 hydrOXYzine leni (VISTARIL) 25 MG capsule      6. Need for vaccination for pneumococcus  Z23 Pneumococcal 20 Valent Conjugate (Prevnar 20)      7. Need for COVID-19 vaccine  Z23 COVID-19 12+ (PFIZER)           Pap Smear/HPV last completed 1/19/23 and was normal.  Tdap up to date, last completed 10/8/2020.  Flu is up to date for the season.  Covid and prevnar 20 updated today.  She wanted to try a different triptan.  Prescription for Maxalt was given to try instead.  PDMP reviewed and is appropriate.  Urine drug screen and contract were both updated today.  Adderall XR 20 mg #30 x 3 and Adderall 10 mg immediate release #30 x 3 refilled today.  She will be due for follow-up around 2/3/2025.  She usually just uses this intermittently when she is working.  Stable.  Albuterol refilled today.  Stable.  Hydroxyzine refilled today.   She is no longer taking Zoloft.  Prevnar 20 updated today.  COVID vaccine updated today.    Return in about 53 weeks (around 11/17/2025) for Annual Wellness Visit.           Cyn Avendaño MD

## 2024-11-11 NOTE — LETTER
My Asthma Action Plan    Name: George Mcknight   YOB: 1995  Date: 11/11/2024   My doctor: Cyn Avendaño MD   My clinic: [facility]        My Rescue Medicine:   Albuterol inhaler (Proair/Ventolin/Proventil HFA)  2-4 puffs EVERY 4 HOURS as needed. Use a spacer if recommended by your provider.   My Asthma Severity:   Intermittent / Exercise Induced  Know your asthma triggers: Patient is unaware of triggers             GREEN ZONE   Good Control  I feel good  No cough or wheeze  Can work, sleep and play without asthma symptoms       Take your asthma control medicine every day.     If exercise triggers your asthma, take your rescue medication  15 minutes before exercise or sports, and  During exercise if you have asthma symptoms  Spacer to use with inhaler: If you have a spacer, make sure to use it with your inhaler             YELLOW ZONE Getting Worse  I have ANY of these:  I do not feel good  Cough or wheeze  Chest feels tight  Wake up at night   Keep taking your Green Zone medications  Start taking your rescue medicine:  every 20 minutes for up to 1 hour. Then every 4 hours for 24-48 hours.  If you stay in the Yellow Zone for more than 12-24 hours, contact your doctor.  If you do not return to the Green Zone in 12-24 hours or you get worse, start taking your oral steroid medicine if prescribed by your provider.           RED ZONE Medical Alert - Get Help  I have ANY of these:  I feel awful  Medicine is not helping  Breathing getting harder  Trouble walking or talking  Nose opens wide to breathe       Take your rescue medicine NOW  If your provider has prescribed an oral steroid medicine, start taking it NOW  Call your doctor NOW  If you are still in the Red Zone after 20 minutes and you have not reached your doctor:  Take your rescue medicine again and  Call 911 or go to the emergency room right away    See your regular doctor within 2 weeks of an Emergency Room or Urgent  Care visit for follow-up treatment.          Annual Reminders:  Meet with Asthma Educator,  Flu Shot in the Fall, consider Pneumonia Vaccination for patients with asthma (aged 19 and older).    Pharmacy: [facility] - [place], [place] - [address]    Electronically signed by Cyn Avendaño MD   Date: 11/11/24                    Asthma Triggers  How To Control Things That Make Your Asthma Worse    Triggers are things that make your asthma worse.  Look at the list below to help you find your triggers and   what you can do about them. You can help prevent asthma flare-ups by staying away from your triggers.      Trigger                                                          What you can do   Cigarette Smoke  Tobacco smoke can make asthma worse. Do not allow smoking in your home, car or around you.  Be sure no one smokes at a child s day care or school.  If you smoke, ask your health care provider for ways to help you quit.  Ask family members to quit too.  Ask your health care provider for a referral to Quit Plan to help you quit smoking, or call 2-010-378-PLAN.     Colds, Flu, Bronchitis  These are common triggers of asthma. Wash your hands often.  Don t touch your eyes, nose or mouth.  Get a flu shot every year.     Dust Mites  These are tiny bugs that live in cloth or carpet. They are too small to see. Wash sheets and blankets in hot water every week.   Encase pillows and mattress in dust mite proof covers.  Avoid having carpet if you can. If you have carpet, vacuum weekly.   Use a dust mask and HEPA vacuum.   Pollen and Outdoor Mold  Some people are allergic to trees, grass, or weed pollen, or molds. Try to keep your windows closed.  Limit time out doors when pollen count is high.   Ask you health care provider about taking medicine during allergy season.     Animal Dander  Some people are allergic to skin flakes, urine or saliva from pets with fur or feathers. Keep pets with fur or  feathers out of your home.    If you can t keep the pet outdoors, then keep the pet out of your bedroom.  Keep the bedroom door closed.  Keep pets off cloth furniture and away from stuffed toys.     Mice, Rats, and Cockroaches  Some people are allergic to the waste from these pests.   Cover food and garbage.  Clean up spills and food crumbs.  Store grease in the refrigerator.   Keep food out of the bedroom.   Indoor Mold  This can be a trigger if your home has high moisture. Fix leaking faucets, pipes, or other sources of water.   Clean moldy surfaces.  Dehumidify basement if it is damp and smelly.   Smoke, Strong Odors, and Sprays  These can reduce air quality. Stay away from strong odors and sprays, such as perfume, powder, hair spray, paints, smoke incense, paint, cleaning products, candles and new carpet.   Exercise or Sports  Some people with asthma have this trigger. Be active!  Ask your doctor about taking medicine before sports or exercise to prevent symptoms.    Warm up for 5-10 minutes before and after sports or exercise.     Other Triggers of Asthma  Cold air:  Cover your nose and mouth with a scarf.  Sometimes laughing or crying can be a trigger.  Some medicines and food can trigger asthma.

## 2024-11-11 NOTE — PATIENT INSTRUCTIONS
Patient Education   Preventive Care Advice   This is general advice given by our system to help you stay healthy. However, your care team may have specific advice just for you. Please talk to your care team about your preventive care needs.  Nutrition  Eat 5 or more servings of fruits and vegetables each day.  Try wheat bread, brown rice and whole grain pasta (instead of white bread, rice, and pasta).  Get enough calcium and vitamin D. Check the label on foods and aim for 100% of the RDA (recommended daily allowance).  Lifestyle  Exercise at least 150 minutes each week  (30 minutes a day, 5 days a week).  Do muscle strengthening activities 2 days a week. These help control your weight and prevent disease.  No smoking.  Wear sunscreen to prevent skin cancer.  Have a dental exam and cleaning every 6 months.  Yearly exams  See your health care team every year to talk about:  Any changes in your health.  Any medicines your care team has prescribed.  Preventive care, family planning, and ways to prevent chronic diseases.  Shots (vaccines)   HPV shots (up to age 26), if you've never had them before.  Hepatitis B shots (up to age 59), if you've never had them before.  COVID-19 shot: Get this shot when it's due.  Flu shot: Get a flu shot every year.  Tetanus shot: Get a tetanus shot every 10 years.  Pneumococcal, hepatitis A, and RSV shots: Ask your care team if you need these based on your risk.  Shingles shot (for age 50 and up)  General health tests  Diabetes screening:  Starting at age 35, Get screened for diabetes at least every 3 years.  If you are younger than age 35, ask your care team if you should be screened for diabetes.  Cholesterol test: At age 39, start having a cholesterol test every 5 years, or more often if advised.  Bone density scan (DEXA): At age 50, ask your care team if you should have this scan for osteoporosis (brittle bones).  Hepatitis C: Get tested at least once in your life.  STIs (sexually  transmitted infections)  Before age 24: Ask your care team if you should be screened for STIs.  After age 24: Get screened for STIs if you're at risk. You are at risk for STIs (including HIV) if:  You are sexually active with more than one person.  You don't use condoms every time.  You or a partner was diagnosed with a sexually transmitted infection.  If you are at risk for HIV, ask about PrEP medicine to prevent HIV.  Get tested for HIV at least once in your life, whether you are at risk for HIV or not.  Cancer screening tests  Cervical cancer screening: If you have a cervix, begin getting regular cervical cancer screening tests starting at age 21.  Breast cancer scan (mammogram): If you've ever had breasts, begin having regular mammograms starting at age 40. This is a scan to check for breast cancer.  Colon cancer screening: It is important to start screening for colon cancer at age 45.  Have a colonoscopy test every 10 years (or more often if you're at risk) Or, ask your provider about stool tests like a FIT test every year or Cologuard test every 3 years.  To learn more about your testing options, visit:   .  For help making a decision, visit:   https://bit.ly/kv12620.  Prostate cancer screening test: If you have a prostate, ask your care team if a prostate cancer screening test (PSA) at age 55 is right for you.  Lung cancer screening: If you are a current or former smoker ages 50 to 80, ask your care team if ongoing lung cancer screenings are right for you.  For informational purposes only. Not to replace the advice of your health care provider. Copyright   2023 [facility]. All rights reserved. Clinically reviewed by the [facility]. Vinja [id number] - REV 01/24.  Learning About Stress  What is stress?     Stress is your body's response to a hard situation. Your body can have a physical, emotional, or mental response. Stress is a fact of life for most people, and it  affects everyone differently. What causes stress for you may not be stressful for someone else.  A lot of things can cause stress. You may feel stress when you go on a job interview, take a test, or run a race. This kind of short-term stress is normal and even useful. It can help you if you need to work hard or react quickly. For example, stress can help you finish an important job on time.  Long-term stress is caused by ongoing stressful situations or events. Examples of long-term stress include long-term health problems, ongoing problems at work, or conflicts in your family. Long-term stress can harm your health.  How does stress affect your health?  When you are stressed, your body responds as though you are in danger. It makes hormones that speed up your heart, make you breathe faster, and give you a burst of energy. This is called the fight-or-flight stress response. If the stress is over quickly, your body goes back to normal and no harm is done.  But if stress happens too often or lasts too long, it can have bad effects. Long-term stress can make you more likely to get sick, and it can make symptoms of some diseases worse. If you tense up when you are stressed, you may develop neck, shoulder, or low back pain. Stress is linked to high blood pressure and heart disease.  Stress also harms your emotional health. It can make you quispe, tense, or depressed. Your relationships may suffer, and you may not do well at work or school.  What can you do to manage stress?  You can try these things to help manage stress:   Do something active. Exercise or activity can help reduce stress. Walking is a great way to get started. Even everyday activities such as housecleaning or yard work can help.  Try yoga or medhat chi. These techniques combine exercise and meditation. You may need some training at first to learn them.  Do something you enjoy. For example, listen to music or go to a movie. Practice your hobby or do volunteer  "work.  Meditate. This can help you relax, because you are not worrying about what happened before or what may happen in the future.  Do guided imagery. Imagine yourself in any setting that helps you feel calm. You can use online videos, books, or a teacher to guide you.  Do breathing exercises. For example:  From a standing position, bend forward from the waist with your knees slightly bent. Let your arms dangle close to the floor.  Breathe in slowly and deeply as you return to a standing position. Roll up slowly and lift your head last.  Hold your breath for just a few seconds in the standing position.  Breathe out slowly and bend forward from the waist.  Let your feelings out. Talk, laugh, cry, and express anger when you need to. Talking with supportive friends or family, a counselor, or a fe leader about your feelings is a healthy way to relieve stress. Avoid discussing your feelings with people who make you feel worse.  Write. It may help to write about things that are bothering you. This helps you find out how much stress you feel and what is causing it. When you know this, you can find better ways to cope.  What can you do to prevent stress?  You might try some of these things to help prevent stress:  Manage your time. This helps you find time to do the things you want and need to do.  Get enough sleep. Your body recovers from the stresses of the day while you are sleeping.  Get support. Your family, friends, and community can make a difference in how you experience stress.  Limit your news feed. Avoid or limit time on social media or news that may make you feel stressed.  Do something active. Exercise or activity can help reduce stress. Walking is a great way to get started.  Where can you learn more?  Go to https://www.[employer].net/patiented  Enter N032 in the search box to learn more about \"Learning About Stress.\"  Current as of: October 24, 2023  Content Version: 14.2 2024 [address]. "   Care instructions adapted under license by your healthcare professional. If you have questions about a medical condition or this instruction, always ask your healthcare professional. [employer] disclaims any warranty or liability for your use of this information.

## 2024-11-11 NOTE — NURSING NOTE
Chief Complaint   Patient presents with    Physical         Medication Reconciliation: complete    Irene Lyons, LPN

## 2024-11-12 NOTE — TELEPHONE ENCOUNTER
Prescriptions approved 11/11/24. Pt notified via [employer]. Marce Sanchez RN .............. 11/12/2024  10:46 AM

## 2025-05-17 ENCOUNTER — MYC MEDICAL ADVICE (OUTPATIENT)
Dept: FAMILY MEDICINE | Facility: OTHER | Age: 30
End: 2025-05-17
Payer: COMMERCIAL

## 2025-05-17 NOTE — LETTER
5/28/2025      George Ninose  [address] [place] [address]    To Whom It May Concern,    I am Primary Care Provider for George Mcknight.  Please allow for 20% window tinting in her personal vehicle to aid with her headaches and light sensitivity.      VLT (visible light transmission): 20%  Vehicle: 2023 Ford Escape   [place] License Plate:       Sincerely,    Cyn Avendaño MD    Electronically signed

## 2025-05-17 NOTE — LETTER
5/28/2025      George Ninose  [address] [place] [address]    To Whom It May Concern,    I am Primary Care Provider for George Mckngiht.  Please allow for 20% window tinting in her personal vehicle to aid with her headaches and light sensitivity.      VLT (visible light transmission): 20%  Vehicle: 2023 [employer] Escape   [place] License Plate:       Sincerely,    Cyn Avendaño MD    Electronically signed

## 2025-05-19 NOTE — TELEPHONE ENCOUNTER
" Would you write \"Rx\" for 20% window tinting for headaches/light sensitivity?    If so- route back to [place] and we can help draft.     2.  Can Med appt for Adderall be video visit or needs to be in person?    I know can't be just telephone.    LOV 11/11/24 for Physical.     Diane Valenzuela RN on 5/19/2025 at 8:18 AM    "

## 2025-05-19 NOTE — TELEPHONE ENCOUNTER
I don't think her insurance would cover the window tinting.    She could do a video visit for her next adderal refill since her urine drug screen and contract are up to date.    Cyn Avendaño MD

## 2025-05-19 NOTE — TELEPHONE ENCOUNTER
Not looking for window tinting to be covered by insurance but looking for a letter from provider with info stating window tinting is ok to do for headaches/light sensitivity.     If willing to provide- route back to [place] as she will need to get info to us.     Diane Valenzuela RN on 5/19/2025 at 10:39 AM

## 2025-05-28 NOTE — TELEPHONE ENCOUNTER
Letter posted to [address].     Patient update on [address].    Diane Valenzuela RN on 5/28/2025 at 2:11 PM

## 2025-05-28 NOTE — TELEPHONE ENCOUNTER
Cyn,    Letter drafted/pended for review/signing.  (As requested for window tinting in car for headaches/light sensitivity)    Route back to [place] when signed and [facility] can  [facility]      Diane Valenzuela RN on 5/28/2025 at 1:15 PM

## 2025-07-06 ENCOUNTER — E-VISIT (OUTPATIENT)
Dept: FAMILY MEDICINE | Facility: OTHER | Age: 30
End: 2025-07-06
Payer: COMMERCIAL

## 2025-07-06 DIAGNOSIS — F98.8 ATTENTION DEFICIT DISORDER, UNSPECIFIED TYPE: Primary | ICD-10-CM

## 2025-07-06 ASSESSMENT — ANXIETY QUESTIONNAIRES
GAD7 TOTAL SCORE: 8
3. WORRYING TOO MUCH ABOUT DIFFERENT THINGS: SEVERAL DAYS
GAD7 TOTAL SCORE: 8
1. FEELING NERVOUS, ANXIOUS, OR ON EDGE: MORE THAN HALF THE DAYS
7. FEELING AFRAID AS IF SOMETHING AWFUL MIGHT HAPPEN: SEVERAL DAYS
7. FEELING AFRAID AS IF SOMETHING AWFUL MIGHT HAPPEN: SEVERAL DAYS
GAD7 TOTAL SCORE: 8
6. BECOMING EASILY ANNOYED OR IRRITABLE: SEVERAL DAYS
5. BEING SO RESTLESS THAT IT IS HARD TO SIT STILL: SEVERAL DAYS
2. NOT BEING ABLE TO STOP OR CONTROL WORRYING: SEVERAL DAYS
IF YOU CHECKED OFF ANY PROBLEMS ON THIS QUESTIONNAIRE, HOW DIFFICULT HAVE THESE PROBLEMS MADE IT FOR YOU TO DO YOUR WORK, TAKE CARE OF THINGS AT HOME, OR GET ALONG WITH OTHER PEOPLE: SOMEWHAT DIFFICULT
4. TROUBLE RELAXING: SEVERAL DAYS
8. IF YOU CHECKED OFF ANY PROBLEMS, HOW DIFFICULT HAVE THESE MADE IT FOR YOU TO DO YOUR WORK, TAKE CARE OF THINGS AT HOME, OR GET ALONG WITH OTHER PEOPLE?: SOMEWHAT DIFFICULT

## 2025-07-06 ASSESSMENT — PATIENT HEALTH QUESTIONNAIRE - PHQ9
SUM OF ALL RESPONSES TO PHQ QUESTIONS 1-9: 7
10. IF YOU CHECKED OFF ANY PROBLEMS, HOW DIFFICULT HAVE THESE PROBLEMS MADE IT FOR YOU TO DO YOUR WORK, TAKE CARE OF THINGS AT HOME, OR GET ALONG WITH OTHER PEOPLE: SOMEWHAT DIFFICULT
SUM OF ALL RESPONSES TO PHQ QUESTIONS 1-9: 7

## 2025-07-06 NOTE — PATIENT INSTRUCTIONS
Dear George,    We are sorry you are not feeling well. Based on the responses you provided, it is recommended that you be seen in-person in clinic so we can better evaluate your symptoms. Please schedule this visit in [place]. You will have a Schedule Now button in [employer] to help with scheduling this appointment. Otherwise, you can call [phone] to schedule an appointment.     You will not be charged for this eVisit. Thank you for trusting us with your care.     Cyn Avendaño MD

## 2025-07-09 ENCOUNTER — PATIENT OUTREACH (OUTPATIENT)
Dept: CARE COORDINATION | Facility: CLINIC | Age: 30
End: 2025-07-09
Payer: COMMERCIAL

## (undated) RX ORDER — DIPHENHYDRAMINE HYDROCHLORIDE 50 MG/ML
INJECTION INTRAMUSCULAR; INTRAVENOUS
Status: DISPENSED
Start: 2021-01-15

## (undated) RX ORDER — KETOROLAC TROMETHAMINE 30 MG/ML
INJECTION, SOLUTION INTRAMUSCULAR; INTRAVENOUS
Status: DISPENSED
Start: 2021-01-15

## (undated) RX ORDER — BUPIVACAINE HYDROCHLORIDE 5 MG/ML
INJECTION, SOLUTION EPIDURAL; INTRACAUDAL
Status: DISPENSED
Start: 2023-05-26

## (undated) RX ORDER — LIDOCAINE HYDROCHLORIDE 10 MG/ML
INJECTION, SOLUTION INFILTRATION; PERINEURAL
Status: DISPENSED
Start: 2023-05-26

## (undated) RX ORDER — SODIUM CHLORIDE 9 MG/ML
INJECTION, SOLUTION INTRAVENOUS
Status: DISPENSED
Start: 2021-01-15

## (undated) RX ORDER — TRIAMCINOLONE ACETONIDE 40 MG/ML
INJECTION, SUSPENSION INTRA-ARTICULAR; INTRAMUSCULAR
Status: DISPENSED
Start: 2023-05-26